# Patient Record
Sex: MALE | Race: ASIAN | NOT HISPANIC OR LATINO | URBAN - METROPOLITAN AREA
[De-identification: names, ages, dates, MRNs, and addresses within clinical notes are randomized per-mention and may not be internally consistent; named-entity substitution may affect disease eponyms.]

---

## 2022-09-22 ENCOUNTER — INPATIENT (INPATIENT)
Facility: HOSPITAL | Age: 57
LOS: 9 days | Discharge: HOME HEALTH | DRG: 236 | End: 2022-10-02
Attending: THORACIC SURGERY (CARDIOTHORACIC VASCULAR SURGERY) | Admitting: THORACIC SURGERY (CARDIOTHORACIC VASCULAR SURGERY)
Payer: COMMERCIAL

## 2022-09-22 VITALS
HEART RATE: 67 BPM | SYSTOLIC BLOOD PRESSURE: 190 MMHG | DIASTOLIC BLOOD PRESSURE: 66 MMHG | WEIGHT: 164.46 LBS | RESPIRATION RATE: 16 BRPM | TEMPERATURE: 97 F | OXYGEN SATURATION: 100 %

## 2022-09-22 LAB
A1C WITH ESTIMATED AVERAGE GLUCOSE RESULT: 7.6 % — HIGH (ref 4–5.6)
ALBUMIN SERPL ELPH-MCNC: 4.1 G/DL — SIGNIFICANT CHANGE UP (ref 3.3–5)
ALP SERPL-CCNC: 68 U/L — SIGNIFICANT CHANGE UP (ref 40–120)
ALT FLD-CCNC: 25 U/L — SIGNIFICANT CHANGE UP (ref 10–45)
ANION GAP SERPL CALC-SCNC: 15 MMOL/L — SIGNIFICANT CHANGE UP (ref 5–17)
APTT BLD: 31.8 SEC — SIGNIFICANT CHANGE UP (ref 27.5–35.5)
AST SERPL-CCNC: 25 U/L — SIGNIFICANT CHANGE UP (ref 10–40)
BASOPHILS # BLD AUTO: 0.1 K/UL — SIGNIFICANT CHANGE UP (ref 0–0.2)
BASOPHILS NFR BLD AUTO: 1.1 % — SIGNIFICANT CHANGE UP (ref 0–2)
BILIRUB SERPL-MCNC: 0.4 MG/DL — SIGNIFICANT CHANGE UP (ref 0.2–1.2)
BLD GP AB SCN SERPL QL: NEGATIVE — SIGNIFICANT CHANGE UP
BLD GP AB SCN SERPL QL: NEGATIVE — SIGNIFICANT CHANGE UP
BUN SERPL-MCNC: 24 MG/DL — HIGH (ref 7–23)
CALCIUM SERPL-MCNC: 9.5 MG/DL — SIGNIFICANT CHANGE UP (ref 8.4–10.5)
CHLORIDE SERPL-SCNC: 104 MMOL/L — SIGNIFICANT CHANGE UP (ref 96–108)
CHOLEST SERPL-MCNC: 146 MG/DL — SIGNIFICANT CHANGE UP
CK MB CFR SERPL CALC: 3.8 NG/ML — SIGNIFICANT CHANGE UP (ref 0–6.7)
CK SERPL-CCNC: 150 U/L — SIGNIFICANT CHANGE UP (ref 30–200)
CO2 SERPL-SCNC: 21 MMOL/L — LOW (ref 22–31)
CREAT SERPL-MCNC: 1.44 MG/DL — HIGH (ref 0.5–1.3)
EGFR: 57 ML/MIN/1.73M2 — LOW
EOSINOPHIL # BLD AUTO: 0.15 K/UL — SIGNIFICANT CHANGE UP (ref 0–0.5)
EOSINOPHIL NFR BLD AUTO: 1.7 % — SIGNIFICANT CHANGE UP (ref 0–6)
ESTIMATED AVERAGE GLUCOSE: 171 MG/DL — HIGH (ref 68–114)
GLUCOSE BLDC GLUCOMTR-MCNC: 125 MG/DL — HIGH (ref 70–99)
GLUCOSE SERPL-MCNC: 151 MG/DL — HIGH (ref 70–99)
HCT VFR BLD CALC: 41.1 % — SIGNIFICANT CHANGE UP (ref 39–50)
HDLC SERPL-MCNC: 43 MG/DL — SIGNIFICANT CHANGE UP
HGB BLD-MCNC: 13.5 G/DL — SIGNIFICANT CHANGE UP (ref 13–17)
IMM GRANULOCYTES NFR BLD AUTO: 0.3 % — SIGNIFICANT CHANGE UP (ref 0–0.9)
INR BLD: 1.03 — SIGNIFICANT CHANGE UP (ref 0.88–1.16)
LIPID PNL WITH DIRECT LDL SERPL: 59 MG/DL — SIGNIFICANT CHANGE UP
LYMPHOCYTES # BLD AUTO: 1.8 K/UL — SIGNIFICANT CHANGE UP (ref 1–3.3)
LYMPHOCYTES # BLD AUTO: 20.6 % — SIGNIFICANT CHANGE UP (ref 13–44)
MCHC RBC-ENTMCNC: 29 PG — SIGNIFICANT CHANGE UP (ref 27–34)
MCHC RBC-ENTMCNC: 32.8 GM/DL — SIGNIFICANT CHANGE UP (ref 32–36)
MCV RBC AUTO: 88.2 FL — SIGNIFICANT CHANGE UP (ref 80–100)
MONOCYTES # BLD AUTO: 1.06 K/UL — HIGH (ref 0–0.9)
MONOCYTES NFR BLD AUTO: 12.1 % — SIGNIFICANT CHANGE UP (ref 2–14)
NEUTROPHILS # BLD AUTO: 5.59 K/UL — SIGNIFICANT CHANGE UP (ref 1.8–7.4)
NEUTROPHILS NFR BLD AUTO: 64.2 % — SIGNIFICANT CHANGE UP (ref 43–77)
NON HDL CHOLESTEROL: 103 MG/DL — SIGNIFICANT CHANGE UP
NRBC # BLD: 0 /100 WBCS — SIGNIFICANT CHANGE UP (ref 0–0)
NT-PROBNP SERPL-SCNC: 127 PG/ML — SIGNIFICANT CHANGE UP (ref 0–300)
PLATELET # BLD AUTO: 239 K/UL — SIGNIFICANT CHANGE UP (ref 150–400)
POTASSIUM SERPL-MCNC: 4.2 MMOL/L — SIGNIFICANT CHANGE UP (ref 3.5–5.3)
POTASSIUM SERPL-SCNC: 4.2 MMOL/L — SIGNIFICANT CHANGE UP (ref 3.5–5.3)
PROT SERPL-MCNC: 7.7 G/DL — SIGNIFICANT CHANGE UP (ref 6–8.3)
PROTHROM AB SERPL-ACNC: 12.3 SEC — SIGNIFICANT CHANGE UP (ref 10.5–13.4)
RBC # BLD: 4.66 M/UL — SIGNIFICANT CHANGE UP (ref 4.2–5.8)
RBC # FLD: 16.1 % — HIGH (ref 10.3–14.5)
RH IG SCN BLD-IMP: POSITIVE — SIGNIFICANT CHANGE UP
RH IG SCN BLD-IMP: POSITIVE — SIGNIFICANT CHANGE UP
SARS-COV-2 RNA SPEC QL NAA+PROBE: NEGATIVE — SIGNIFICANT CHANGE UP
SODIUM SERPL-SCNC: 140 MMOL/L — SIGNIFICANT CHANGE UP (ref 135–145)
TRIGL SERPL-MCNC: 222 MG/DL — HIGH
TROPONIN T SERPL-MCNC: 0.01 NG/ML — SIGNIFICANT CHANGE UP (ref 0–0.01)
TSH SERPL-MCNC: 2.36 UIU/ML — SIGNIFICANT CHANGE UP (ref 0.27–4.2)
WBC # BLD: 8.73 K/UL — SIGNIFICANT CHANGE UP (ref 3.8–10.5)
WBC # FLD AUTO: 8.73 K/UL — SIGNIFICANT CHANGE UP (ref 3.8–10.5)

## 2022-09-22 PROCEDURE — 71045 X-RAY EXAM CHEST 1 VIEW: CPT | Mod: 26

## 2022-09-22 PROCEDURE — 93010 ELECTROCARDIOGRAM REPORT: CPT

## 2022-09-22 RX ORDER — DEXTROSE 50 % IN WATER 50 %
25 SYRINGE (ML) INTRAVENOUS ONCE
Refills: 0 | Status: DISCONTINUED | OUTPATIENT
Start: 2022-09-22 | End: 2022-09-28

## 2022-09-22 RX ORDER — SODIUM CHLORIDE 9 MG/ML
1000 INJECTION, SOLUTION INTRAVENOUS
Refills: 0 | Status: DISCONTINUED | OUTPATIENT
Start: 2022-09-22 | End: 2022-09-28

## 2022-09-22 RX ORDER — ALOGLIPTIN 12.5 MG/1
0 TABLET, FILM COATED ORAL
Qty: 0 | Refills: 0 | DISCHARGE

## 2022-09-22 RX ORDER — LOSARTAN POTASSIUM 100 MG/1
0 TABLET, FILM COATED ORAL
Qty: 0 | Refills: 0 | DISCHARGE

## 2022-09-22 RX ORDER — GLUCAGON INJECTION, SOLUTION 0.5 MG/.1ML
1 INJECTION, SOLUTION SUBCUTANEOUS ONCE
Refills: 0 | Status: DISCONTINUED | OUTPATIENT
Start: 2022-09-22 | End: 2022-09-28

## 2022-09-22 RX ORDER — LEVOTHYROXINE SODIUM 125 MCG
0 TABLET ORAL
Qty: 0 | Refills: 0 | DISCHARGE

## 2022-09-22 RX ORDER — GABAPENTIN 400 MG/1
0 CAPSULE ORAL
Qty: 0 | Refills: 0 | DISCHARGE

## 2022-09-22 RX ORDER — METFORMIN HYDROCHLORIDE 850 MG/1
0 TABLET ORAL
Qty: 0 | Refills: 0 | DISCHARGE

## 2022-09-22 RX ORDER — HEPARIN SODIUM 5000 [USP'U]/ML
5000 INJECTION INTRAVENOUS; SUBCUTANEOUS EVERY 8 HOURS
Refills: 0 | Status: DISCONTINUED | OUTPATIENT
Start: 2022-09-22 | End: 2022-09-23

## 2022-09-22 RX ORDER — METOPROLOL TARTRATE 50 MG
0 TABLET ORAL
Qty: 0 | Refills: 0 | DISCHARGE

## 2022-09-22 RX ORDER — ATORVASTATIN CALCIUM 80 MG/1
10 TABLET, FILM COATED ORAL AT BEDTIME
Refills: 0 | Status: DISCONTINUED | OUTPATIENT
Start: 2022-09-22 | End: 2022-09-28

## 2022-09-22 RX ORDER — SODIUM CHLORIDE 9 MG/ML
3 INJECTION INTRAMUSCULAR; INTRAVENOUS; SUBCUTANEOUS EVERY 8 HOURS
Refills: 0 | Status: DISCONTINUED | OUTPATIENT
Start: 2022-09-22 | End: 2022-09-28

## 2022-09-22 RX ORDER — ASPIRIN/CALCIUM CARB/MAGNESIUM 324 MG
0 TABLET ORAL
Qty: 0 | Refills: 0 | DISCHARGE

## 2022-09-22 RX ORDER — INSULIN LISPRO 100/ML
VIAL (ML) SUBCUTANEOUS
Refills: 0 | Status: DISCONTINUED | OUTPATIENT
Start: 2022-09-22 | End: 2022-09-28

## 2022-09-22 RX ORDER — ATORVASTATIN CALCIUM 80 MG/1
0 TABLET, FILM COATED ORAL
Qty: 0 | Refills: 0 | DISCHARGE

## 2022-09-22 RX ORDER — DEXTROSE 50 % IN WATER 50 %
12.5 SYRINGE (ML) INTRAVENOUS ONCE
Refills: 0 | Status: DISCONTINUED | OUTPATIENT
Start: 2022-09-22 | End: 2022-09-28

## 2022-09-22 RX ORDER — DEXTROSE 50 % IN WATER 50 %
15 SYRINGE (ML) INTRAVENOUS ONCE
Refills: 0 | Status: DISCONTINUED | OUTPATIENT
Start: 2022-09-22 | End: 2022-09-28

## 2022-09-22 RX ORDER — FUROSEMIDE 40 MG
0 TABLET ORAL
Qty: 0 | Refills: 0 | DISCHARGE

## 2022-09-22 RX ORDER — LEVOTHYROXINE SODIUM 125 MCG
50 TABLET ORAL DAILY
Refills: 0 | Status: DISCONTINUED | OUTPATIENT
Start: 2022-09-23 | End: 2022-09-28

## 2022-09-22 RX ORDER — METOPROLOL TARTRATE 50 MG
12.5 TABLET ORAL EVERY 12 HOURS
Refills: 0 | Status: DISCONTINUED | OUTPATIENT
Start: 2022-09-23 | End: 2022-09-23

## 2022-09-22 RX ORDER — PANTOPRAZOLE SODIUM 20 MG/1
40 TABLET, DELAYED RELEASE ORAL
Refills: 0 | Status: DISCONTINUED | OUTPATIENT
Start: 2022-09-23 | End: 2022-09-28

## 2022-09-22 RX ORDER — GABAPENTIN 400 MG/1
300 CAPSULE ORAL EVERY 12 HOURS
Refills: 0 | Status: DISCONTINUED | OUTPATIENT
Start: 2022-09-23 | End: 2022-09-28

## 2022-09-22 RX ORDER — ASPIRIN/CALCIUM CARB/MAGNESIUM 324 MG
81 TABLET ORAL DAILY
Refills: 0 | Status: DISCONTINUED | OUTPATIENT
Start: 2022-09-23 | End: 2022-09-28

## 2022-09-22 RX ORDER — INSULIN NPH HUM/REG INSULIN HM 70-30/ML
0 VIAL (ML) SUBCUTANEOUS
Qty: 0 | Refills: 0 | DISCHARGE

## 2022-09-22 RX ORDER — EMPAGLIFLOZIN 10 MG/1
0 TABLET, FILM COATED ORAL
Qty: 0 | Refills: 0 | DISCHARGE

## 2022-09-22 RX ADMIN — SODIUM CHLORIDE 3 MILLILITER(S): 9 INJECTION INTRAMUSCULAR; INTRAVENOUS; SUBCUTANEOUS at 21:15

## 2022-09-22 NOTE — H&P ADULT - ASSESSMENT
55 yo M with PMHx of HTN, HLD, DM, PAD, CKD, CVA x3 (2013, 2014, 2015) with residual right sided weakness, who originally present to [facility] following an abnormal stress test. During workup patient had Coronary Catheterization showing pLAD 80% stenosis, Diagonal 1 70% stenosis, Diagonal 2 80% stenosis, Ramus 70% stenosis, OM1 90% stenosis, mRCA 85% stenosis, EF 60%. Patient now transferred to [facility] under the care of Dr. Herrera, in setting of multi vessel CAD, for further evaluation and management.    Plan:    Neurovascular:   - Pain well controlled with current regimen.  - Diabetic Neuropathy: Cont. Gabapentin  - Hx of CVA x3 in the past, 2013, 2014, 2015 with residual right sided weakness    Cardiovascular:   - Multi-Vessel CAD       - Cardiac cath at [facility] showing multi-vessel CAD       - PST underway       - Cont. ASA, Metoprolol 12.5 q12h, Atorvastatin  - Hemodynamically stable.   - Monitor: BP, HR, tele    Respiratory:   - Oxygenating well on room air  - Encourage continued use of IS 10x/hr and frequent ambulation  - CXR: Pending    GI:  - GI PPX: protonix  - PO Diet    Renal / :  - Continue to monitor renal function: BUN/Cr Pending  - Monitor I/O's daily     Endocrine:    - Hx of DM       - A1c: Pending       - Takes Insulin, Metformin, Jardiance, and Alogliptin at home  - Hx of Hypothyroid       - TSH: Pending       - Cont Synthroid 50    Hematologic:  - CBC: H/H- Pending  - Coagulation Panel. Pending    ID:  - Temperature:  Afebrile  - CBC: WBC- Pending  - Continue to observe for SIRS/Sepsis Syndrome.    Prophylaxis:  - DVT prophylaxis with 5000 SubQ Heparin q8h.  - Continue with SCD's b/l while patient is at rest     Disposition:  - PST

## 2022-09-22 NOTE — H&P ADULT - NSICDXPASTMEDICALHX_GEN_ALL_CORE_FT
PAST MEDICAL HISTORY:  Cerebrovascular accident (CVA)     CKD (chronic kidney disease)     DM (diabetes mellitus)     HLD (hyperlipidemia)     HTN (hypertension)     PAD (peripheral artery disease)

## 2022-09-22 NOTE — H&P ADULT - NSHPSOCIALHISTORY_GEN_ALL_CORE
Smoking: Former Smoker, quit 9 years ago, 35 pack year history  Alcohol: Denies  Illicit Drug Use: Denies  Retired  Lives at home with Wife and Son

## 2022-09-22 NOTE — H&P ADULT - NSICDXFAMILYHX_GEN_ALL_CORE_FT
FAMILY HISTORY:  Father  Still living? No  FH: stroke, Age at diagnosis: Age Unknown    Mother  Still living? Unknown  Family history of diabetes mellitus (DM), Age at diagnosis: Age Unknown

## 2022-09-22 NOTE — H&P ADULT - NSHPPHYSICALEXAM_GEN_ALL_CORE
Appearance: Normal	  HEENT:   Normal oral mucosa, PERRL, EOMI	  Neck: Supple, - JVD; - Carotid Bruit   Cardiovascular: Normal S1 S2. No JVD. No murmurs.  Respiratory: Lungs clear to auscultation. No Rales, Rhonchi, Wheezing.	  Gastrointestinal:  Soft, non-tender, + BS.	  Skin: No rashes. No ecchymoses. No cyanosis.  Extremities: Normal range of motion, 5/5 strength left extremities, 4/5 strength right extremities, no clubbing, cyanosis or edema.  Vascular: Peripheral pulses palpable 2+ bilaterally.  Neurologic: Non-focal.  Psychiatry: A & O x 3, mood & affect appropriate.      T(C): 36.3 (22 Sep 2022 21:00), Max: 36.3 (22 Sep 2022 21:00)  T(F): 97.3 (22 Sep 2022 21:00), Max: 97.3 (22 Sep 2022 21:00)  HR: 67 (22 Sep 2022 21:00) (67 - 67)  BP: 190/66 (22 Sep 2022 21:00) (190/66 - 190/66)  BP(mean): 114 (22 Sep 2022 21:00) (114 - 114)  RR: 16 (22 Sep 2022 21:00) (16 - 16)  SpO2: 100% (22 Sep 2022 21:00) (100% - 100%)    O2 Parameters below as of 22 Sep 2022 21:00  Patient On (Oxygen Delivery Method): room air

## 2022-09-22 NOTE — H&P ADULT - HISTORY OF PRESENT ILLNESS
55 yo M with PMHx of HTN, HLD, DM, PAD, CKD, CVA x3 (2013, 2014, 2015) with residual right sided weakness, who originally present to [facility] following an abnormal stress test. During workup patient had Coronary Catheterization showing pLAD 80% stenosis, Diagonal 1 70% stenosis, Diagonal 2 80% stenosis, Ramus 70% stenosis, OM1 90% stenosis, mRCA 85% stenosis, EF 60%. Patient now transferred to [facility] under the care of Dr. Herrera, in setting of multi vessel CAD, for further evaluation and management.    Patient seen at bedside this evening. Endorses no acute complaints. Admits to MCDOWELL, Denies CP, SOB, Palpitations, PND, N/V, Fever, Chills.

## 2022-09-22 NOTE — H&P ADULT - NSHPREVIEWOFSYSTEMS_GEN_ALL_CORE
CONSTITUTIONAL: No fevers / chills, sweats, fatigue, weight loss, weight gain  NEUROLOGICAL: Admits to tingling sensation in hands and feet. Right sided weakness form prior stroke. Denies seizures, syncope, confusion  EYES: No blurry vision, discharge, pain, loss of vision  ENT: No difficulty hearing, vertigo, dysphagia, epistaxis, recent dental work  CV: Admits to MCDOWELL, No chest pain, palpitations, orthopnea  RESPIRATORY:  No wheezing, SOB, cough / sputum, hemoptysis  GI: No nausea, vomiting, diarrhea, constipation, melena  : No hematuria, dysuria, urgency, incontinence  MUSCULOSKELETAL: No arthritis, joint swelling, muscle weakness  SKIN/BREAST: No rash, itching, hair loss, masses  PSYCHIATRY: No depression, anxiety, suicidal ideation  HEMATOLOGY:  No bruising easily, enlarged lymph nodes, tender lymph nodes  ENDOCRINE: No cold intolerance, heat intolerance, polydipsia

## 2022-09-23 DIAGNOSIS — E11.9 TYPE 2 DIABETES MELLITUS WITHOUT COMPLICATIONS: ICD-10-CM

## 2022-09-23 PROBLEM — Z00.00 ENCOUNTER FOR PREVENTIVE HEALTH EXAMINATION: Status: ACTIVE | Noted: 2022-09-23

## 2022-09-23 LAB
ANION GAP SERPL CALC-SCNC: 15 MMOL/L — SIGNIFICANT CHANGE UP (ref 5–17)
BUN SERPL-MCNC: 21 MG/DL — SIGNIFICANT CHANGE UP (ref 7–23)
CALCIUM SERPL-MCNC: 9.1 MG/DL — SIGNIFICANT CHANGE UP (ref 8.4–10.5)
CHLORIDE SERPL-SCNC: 101 MMOL/L — SIGNIFICANT CHANGE UP (ref 96–108)
CO2 SERPL-SCNC: 22 MMOL/L — SIGNIFICANT CHANGE UP (ref 22–31)
CREAT SERPL-MCNC: 1.42 MG/DL — HIGH (ref 0.5–1.3)
EGFR: 58 ML/MIN/1.73M2 — LOW
GLUCOSE BLDC GLUCOMTR-MCNC: 158 MG/DL — HIGH (ref 70–99)
GLUCOSE BLDC GLUCOMTR-MCNC: 168 MG/DL — HIGH (ref 70–99)
GLUCOSE BLDC GLUCOMTR-MCNC: 183 MG/DL — HIGH (ref 70–99)
GLUCOSE BLDC GLUCOMTR-MCNC: 218 MG/DL — HIGH (ref 70–99)
GLUCOSE SERPL-MCNC: 233 MG/DL — HIGH (ref 70–99)
HCT VFR BLD CALC: 40.4 % — SIGNIFICANT CHANGE UP (ref 39–50)
HCV AB S/CO SERPL IA: 1.2 S/CO — HIGH
HCV AB SERPL-IMP: REACTIVE
HGB BLD-MCNC: 13.2 G/DL — SIGNIFICANT CHANGE UP (ref 13–17)
MCHC RBC-ENTMCNC: 28.4 PG — SIGNIFICANT CHANGE UP (ref 27–34)
MCHC RBC-ENTMCNC: 32.7 GM/DL — SIGNIFICANT CHANGE UP (ref 32–36)
MCV RBC AUTO: 87.1 FL — SIGNIFICANT CHANGE UP (ref 80–100)
NRBC # BLD: 0 /100 WBCS — SIGNIFICANT CHANGE UP (ref 0–0)
PA ADP PRP-ACNC: 210 PRU — SIGNIFICANT CHANGE UP (ref 194–418)
PA ADP PRP-ACNC: 94 PRU — LOW (ref 194–418)
PLATELET # BLD AUTO: 236 K/UL — SIGNIFICANT CHANGE UP (ref 150–400)
POTASSIUM SERPL-MCNC: 4.2 MMOL/L — SIGNIFICANT CHANGE UP (ref 3.5–5.3)
POTASSIUM SERPL-SCNC: 4.2 MMOL/L — SIGNIFICANT CHANGE UP (ref 3.5–5.3)
RBC # BLD: 4.64 M/UL — SIGNIFICANT CHANGE UP (ref 4.2–5.8)
RBC # FLD: 16 % — HIGH (ref 10.3–14.5)
SODIUM SERPL-SCNC: 138 MMOL/L — SIGNIFICANT CHANGE UP (ref 135–145)
WBC # BLD: 7.58 K/UL — SIGNIFICANT CHANGE UP (ref 3.8–10.5)
WBC # FLD AUTO: 7.58 K/UL — SIGNIFICANT CHANGE UP (ref 3.8–10.5)

## 2022-09-23 PROCEDURE — 99232 SBSQ HOSP IP/OBS MODERATE 35: CPT

## 2022-09-23 PROCEDURE — 93306 TTE W/DOPPLER COMPLETE: CPT | Mod: 26

## 2022-09-23 PROCEDURE — 93880 EXTRACRANIAL BILAT STUDY: CPT | Mod: 26

## 2022-09-23 PROCEDURE — 94010 BREATHING CAPACITY TEST: CPT | Mod: 26

## 2022-09-23 PROCEDURE — 71250 CT THORAX DX C-: CPT | Mod: 26

## 2022-09-23 PROCEDURE — 70450 CT HEAD/BRAIN W/O DYE: CPT | Mod: 26

## 2022-09-23 RX ORDER — MAGNESIUM OXIDE 400 MG ORAL TABLET 241.3 MG
400 TABLET ORAL DAILY
Refills: 0 | Status: DISCONTINUED | OUTPATIENT
Start: 2022-09-23 | End: 2022-09-23

## 2022-09-23 RX ORDER — FUROSEMIDE 40 MG
40 TABLET ORAL DAILY
Refills: 0 | Status: DISCONTINUED | OUTPATIENT
Start: 2022-09-23 | End: 2022-09-24

## 2022-09-23 RX ORDER — INSULIN GLARGINE 100 [IU]/ML
12 INJECTION, SOLUTION SUBCUTANEOUS AT BEDTIME
Refills: 0 | Status: DISCONTINUED | OUTPATIENT
Start: 2022-09-23 | End: 2022-09-26

## 2022-09-23 RX ORDER — SODIUM CHLORIDE 9 MG/ML
500 INJECTION, SOLUTION INTRAVENOUS
Refills: 0 | Status: DISCONTINUED | OUTPATIENT
Start: 2022-09-23 | End: 2022-09-25

## 2022-09-23 RX ORDER — INSULIN LISPRO 100/ML
4 VIAL (ML) SUBCUTANEOUS
Refills: 0 | Status: DISCONTINUED | OUTPATIENT
Start: 2022-09-23 | End: 2022-09-26

## 2022-09-23 RX ORDER — ENOXAPARIN SODIUM 100 MG/ML
75 INJECTION SUBCUTANEOUS EVERY 12 HOURS
Refills: 0 | Status: DISCONTINUED | OUTPATIENT
Start: 2022-09-23 | End: 2022-09-24

## 2022-09-23 RX ORDER — METOPROLOL TARTRATE 50 MG
25 TABLET ORAL EVERY 12 HOURS
Refills: 0 | Status: DISCONTINUED | OUTPATIENT
Start: 2022-09-23 | End: 2022-09-28

## 2022-09-23 RX ORDER — ISOSORBIDE MONONITRATE 60 MG/1
30 TABLET, EXTENDED RELEASE ORAL DAILY
Refills: 0 | Status: DISCONTINUED | OUTPATIENT
Start: 2022-09-23 | End: 2022-09-23

## 2022-09-23 RX ORDER — POTASSIUM CHLORIDE 20 MEQ
20 PACKET (EA) ORAL DAILY
Refills: 0 | Status: DISCONTINUED | OUTPATIENT
Start: 2022-09-23 | End: 2022-09-23

## 2022-09-23 RX ORDER — FUROSEMIDE 40 MG
20 TABLET ORAL DAILY
Refills: 0 | Status: DISCONTINUED | OUTPATIENT
Start: 2022-09-23 | End: 2022-09-23

## 2022-09-23 RX ORDER — ISOSORBIDE MONONITRATE 60 MG/1
60 TABLET, EXTENDED RELEASE ORAL DAILY
Refills: 0 | Status: DISCONTINUED | OUTPATIENT
Start: 2022-09-23 | End: 2022-09-28

## 2022-09-23 RX ORDER — POTASSIUM CHLORIDE 20 MEQ
20 PACKET (EA) ORAL DAILY
Refills: 0 | Status: DISCONTINUED | OUTPATIENT
Start: 2022-09-23 | End: 2022-09-24

## 2022-09-23 RX ORDER — POTASSIUM CHLORIDE 20 MEQ
40 PACKET (EA) ORAL DAILY
Refills: 0 | Status: DISCONTINUED | OUTPATIENT
Start: 2022-09-23 | End: 2022-09-23

## 2022-09-23 RX ADMIN — INSULIN GLARGINE 12 UNIT(S): 100 INJECTION, SOLUTION SUBCUTANEOUS at 22:32

## 2022-09-23 RX ADMIN — Medication 25 MILLIGRAM(S): at 00:40

## 2022-09-23 RX ADMIN — Medication 25 MILLIGRAM(S): at 17:45

## 2022-09-23 RX ADMIN — GABAPENTIN 300 MILLIGRAM(S): 400 CAPSULE ORAL at 05:41

## 2022-09-23 RX ADMIN — GABAPENTIN 300 MILLIGRAM(S): 400 CAPSULE ORAL at 17:45

## 2022-09-23 RX ADMIN — PANTOPRAZOLE SODIUM 40 MILLIGRAM(S): 20 TABLET, DELAYED RELEASE ORAL at 06:49

## 2022-09-23 RX ADMIN — Medication 25 MILLIGRAM(S): at 05:42

## 2022-09-23 RX ADMIN — Medication 2: at 07:24

## 2022-09-23 RX ADMIN — Medication 50 MICROGRAM(S): at 05:42

## 2022-09-23 RX ADMIN — Medication 2: at 12:10

## 2022-09-23 RX ADMIN — Medication 4: at 17:20

## 2022-09-23 RX ADMIN — SODIUM CHLORIDE 3 MILLILITER(S): 9 INJECTION INTRAMUSCULAR; INTRAVENOUS; SUBCUTANEOUS at 06:49

## 2022-09-23 RX ADMIN — Medication 20 MILLIEQUIVALENT(S): at 12:47

## 2022-09-23 RX ADMIN — Medication 40 MILLIGRAM(S): at 05:42

## 2022-09-23 RX ADMIN — SODIUM CHLORIDE 3 MILLILITER(S): 9 INJECTION INTRAMUSCULAR; INTRAVENOUS; SUBCUTANEOUS at 22:09

## 2022-09-23 RX ADMIN — ATORVASTATIN CALCIUM 10 MILLIGRAM(S): 80 TABLET, FILM COATED ORAL at 22:05

## 2022-09-23 RX ADMIN — ENOXAPARIN SODIUM 75 MILLIGRAM(S): 100 INJECTION SUBCUTANEOUS at 07:24

## 2022-09-23 RX ADMIN — ISOSORBIDE MONONITRATE 60 MILLIGRAM(S): 60 TABLET, EXTENDED RELEASE ORAL at 12:47

## 2022-09-23 RX ADMIN — Medication 81 MILLIGRAM(S): at 12:47

## 2022-09-23 RX ADMIN — ENOXAPARIN SODIUM 75 MILLIGRAM(S): 100 INJECTION SUBCUTANEOUS at 19:24

## 2022-09-23 RX ADMIN — SODIUM CHLORIDE 3 MILLILITER(S): 9 INJECTION INTRAMUSCULAR; INTRAVENOUS; SUBCUTANEOUS at 13:07

## 2022-09-23 RX ADMIN — Medication 2: at 22:08

## 2022-09-23 NOTE — PROGRESS NOTE ADULT - ASSESSMENT
57 y/o male with PMHx of HTN, HLD, DM, PAD, CKD, CVA x3 (2013, 2014, 2015) with residual right sided weakness, who originally present to [facility] following an abnormal stress test. During workup patient had Coronary Catheterization showing pLAD 80% stenosis, Diagonal 1 70% stenosis, Diagonal 2 80% stenosis, Ramus 70% stenosis, OM1 90% stenosis, mRCA 85% stenosis, EF 60%. On 9/22 he was transferred to [facility] under the care of Dr. Herrera, in setting of multi vessel CAD, for further evaluation and management. On 9/23 he was consented for a Cabg with a plan for OR either Monday or Wednesday. Neurology was consulted secondary to CVA history and obtained a baseline neuro exam. Endocrinology consulted with recommendations appreciated. Continue pre op planning.     Plan:    Neurovascular:   - Pain well controlled with current regimen.  - Diabetic Neuropathy: Cont. Gabapentin  #Hx of CVA x3 in the past, 2013, 2014, 2015 with residual right sided weakness  -Neuro consulted for baseline neuro exam  -CTH preformed with no acute abnormalities    Cardiovascular:   #Multi-Vessel CAD  - continue pre op planning, consent is signed and in chart  - CT chest completed, no acute abnormalities   - US of carotids completed with  no stenosis or flow abnormalities  - Cont. ASA, Metoprolol 12.5 q12h, Atorvastatin, and Imdur  - Hemodynamically stable.   - Monitor: BP, HR, tele    Respiratory:   - Oxygenating well on room air  - Encourage continued use of IS 10x/hr and frequent ambulation  - CXR: Pending    GI:  - GI PPX: protonix  - PO Diet  - Hep C reactive, follow up viral load     Renal / :  - Continue to monitor renal function: BUN/Cr 24/1.44  - Monitor I/O's daily     Endocrine:    #Hx of DM  - A1c: 7.6  - endo consulted, recommended Lispro 4 and lantus 12   - ISS   #Hx of Hypothyroid  - TSH: 2.36  - Cont Synthroid 50    Hematologic:  - CBC: H/H- 13.5/41.1  - Coagulation Panel. - WNL     ID:  - Temperature:  Afebrile  - CBC: WBC- 8.73  - Continue to observe for SIRS/Sepsis Syndrome.    Prophylaxis:  - DVT prophylaxis with 5000 SubQ Heparin q8h.  - Continue with SCD's b/l while patient is at rest     Disposition:  - continue pre op planning for OR next week

## 2022-09-23 NOTE — CONSULT NOTE ADULT - PROBLEM SELECTOR RECOMMENDATION 9
Please continue lantus       units at night / morning.  Please continue lispro      units before each meal.  Please continue lispro moderate / low dose sliding scale four times daily with meals and at bedtime    Pt's fingerstick glucose goal is     Will continue to monitor     For discharge, pt can continue    Pt can follow up at discharge with [facility] by calling  to make an appointment.   Will discuss case with     and update primary team Please continue lantus       units at night / morning.  Please continue lispro      units before each meal.  Please continue lispro moderate / low dose sliding scale four times daily with meals and at bedtime    Pt's fingerstick glucose goal is 100-180.    Will continue to monitor     For discharge, pt can continue    Pt can follow up at discharge with [facility] by calling  to make an appointment.   Will discuss case with Dr. Chamorro   and update primary team Please start lantus 12      units at night.  Please start lispro  4   units before each meal.  Please continue lispro moderate  dose sliding scale four times daily with meals and at bedtime    Pt's fingerstick glucose goal is 100-180.    Will continue to monitor     For discharge, pt can continue    Pt can follow up at discharge with [facility] by calling  to make an appointment.   Will discuss case with Dr. Chamorro   and update primary team

## 2022-09-23 NOTE — CONSULT NOTE ADULT - ASSESSMENT
57 yo M with PMHx of HTN, HLD, DM, PAD, CKD, CVA x3 (2013, 2014, 2015) with residual right sided weakness, who originally present to [facility] following an abnormal stress test. During workup patient had Coronary Catheterization showing pLAD 80% stenosis, Diagonal 1 70% stenosis, Diagonal 2 80% stenosis, Ramus 70% stenosis, OM1 90% stenosis, mRCA 85% stenosis, EF 60%. Patient now transferred to [facility] under the care of Dr. Herrera, in setting of multi vessel CAD, for further evaluation and management.      A1C: 7.6%  Weight: 75kg  Cr/GRF: 1.4/57  EF 55-60%

## 2022-09-23 NOTE — PROGRESS NOTE ADULT - SUBJECTIVE AND OBJECTIVE BOX
Patient discussed on morning rounds with Dr. Herrera       Operation / Date: Pre op planning for CABG     SUBJECTIVE ASSESSMENT:  56y Male seen and examined at bedside with no complaints. He tolerated a PO diet and has been ambulating with no issues. He denies nausea, vomiting, chest pain or pressure, fevers, chills, sweats, or any further associated symptoms.     Vital Signs Last 24 Hrs  T(C): 36.6 (23 Sep 2022 13:34), Max: 36.6 (23 Sep 2022 13:34)  T(F): 97.9 (23 Sep 2022 13:34), Max: 97.9 (23 Sep 2022 13:34)  HR: 62 (23 Sep 2022 11:45) (58 - 71)  BP: 135/62 (23 Sep 2022 11:45) (135/62 - 190/66)  BP(mean): 89 (23 Sep 2022 11:45) (89 - 119)  RR: 18 (23 Sep 2022 11:45) (16 - 18)  SpO2: 96% (23 Sep 2022 11:45) (96% - 100%)    Parameters below as of 23 Sep 2022 11:45  Patient On (Oxygen Delivery Method): room air      I&O's Detail    22 Sep 2022 07:01  -  23 Sep 2022 07:00  --------------------------------------------------------  IN:    Lactated Ringers: 200 mL  Total IN: 200 mL    OUT:  Total OUT: 0 mL    Total NET: 200 mL    CHEST TUBE:  none  SILVIA DRAIN:  none  EPICARDIAL WIRES: none   TIE DOWNS: none  BERGMAN: none    PHYSICAL EXAM:  GEN: NAD, looks comfortable  Psych: Mood appropriate  Neuro: A&Ox3.  No focal deficits.  Moving all extremities.   HEENT: No obvious abnormalities  CV: S1S2, regular, no murmurs appreciated.  No carotid bruits.  No JVD  Lungs: Clear B/L.  No wheezing, rales or rhonchi  ABD: Soft, non-tender, non-distended.  +Bowel sounds  EXT: Warm and well perfused.  No peripheral edema noted  Musculoskeletal: Moving all extremities with normal ROM, no joint swelling  PV: Pedal pulses are present via doppler, but weak bilaterally on the lower extremities, upper bilateral pulses are strong and palpable.   incisions: none at this time     LABS:                        13.5   8.73  )-----------( 239      ( 22 Sep 2022 21:26 )             41.1       PT/INR - ( 22 Sep 2022 21:26 )   PT: 12.3 sec;   INR: 1.03          PTT - ( 22 Sep 2022 21:26 )  PTT:31.8 sec    09-22    140  |  104  |  24<H>  ----------------------------<  151<H>  4.2   |  21<L>  |  1.44<H>    Ca    9.5      22 Sep 2022 21:26    TPro  7.7  /  Alb  4.1  /  TBili  0.4  /  DBili  x   /  AST  25  /  ALT  25  /  AlkPhos  68  09-22      MEDICATIONS  (STANDING):  aspirin enteric coated 81 milliGRAM(s) Oral daily  atorvastatin 10 milliGRAM(s) Oral at bedtime  dextrose 5%. 1000 milliLiter(s) (50 mL/Hr) IV Continuous <Continuous>  dextrose 5%. 1000 milliLiter(s) (100 mL/Hr) IV Continuous <Continuous>  dextrose 50% Injectable 25 Gram(s) IV Push once  dextrose 50% Injectable 12.5 Gram(s) IV Push once  dextrose 50% Injectable 25 Gram(s) IV Push once  enoxaparin Injectable 75 milliGRAM(s) SubCutaneous every 12 hours  furosemide    Tablet 40 milliGRAM(s) Oral daily  gabapentin 300 milliGRAM(s) Oral every 12 hours  glucagon  Injectable 1 milliGRAM(s) IntraMuscular once  insulin lispro (ADMELOG) corrective regimen sliding scale   SubCutaneous Before meals and at bedtime  isosorbide   mononitrate ER Tablet (IMDUR) 60 milliGRAM(s) Oral daily  lactated ringers. 500 milliLiter(s) (50 mL/Hr) IV Continuous <Continuous>  levothyroxine 50 MICROGram(s) Oral daily  metoprolol tartrate 25 milliGRAM(s) Oral every 12 hours  pantoprazole    Tablet 40 milliGRAM(s) Oral before breakfast  potassium chloride    Tablet ER 20 milliEquivalent(s) Oral daily  sodium chloride 0.9% lock flush 3 milliLiter(s) IV Push every 8 hours    MEDICATIONS  (PRN):  dextrose Oral Gel 15 Gram(s) Oral once PRN Blood Glucose LESS THAN 70 milliGRAM(s)/deciliter      RADIOLOGY & ADDITIONAL TESTS:  < from: US Duplex Carotid Arteries Complete, Bilateral (09.23.22 @ 11:32) >  Antegrade flow is noted within both vertebral arteries.    IMPRESSION:  1. No significant hemodynamic stenosis of either carotid artery.    < end of copied text >    < from: CT Chest No Cont (09.23.22 @ 10:56) >  Impression: 1. Mild amount of calcified plaque thoracic aorta.    2. Mildly calcified aortic valve.    3. Mild emphysema.    --- End of Report ---    < end of copied text >  < from: CT Head No Cont (09.23.22 @ 10:53) >  IMPRESSION:  No acute intracranial pathology.    < end of copied text >  < from: TTE Echo Complete w/o Contrast w/ Doppler (09.23.22 @ 09:29) >  CONCLUSIONS:     1. Normal left ventricular size and systolic function.   2. Normal right ventricular size and systolic function.   3. Normal atria.   4. Aortic sclerosis without significant stenosis.   5. Pulmonary artery systolic pressure is 23 mmHg.   6. Trivial pericardial effusion.   7. No prior echo is available for comparison.    < end of copied text >

## 2022-09-23 NOTE — CONSULT NOTE ADULT - SUBJECTIVE AND OBJECTIVE BOX
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 During workup patient had coronary catheterization showing pLAD 80% stenosis, Diagonal 1 70% stenosis, Diagonal 2 80% stenosis, Ramus 70% stenosis, OM1 90% stenosis, mRCA 85% stenosis, EF 60%. Patient now transferred to [facility] under the care of Dr. Herrera, in setting of multi vessel CAD, for further evaluation and management. He is planned to undergo CABG on Monday or Wednesday of next week. Stroke team consulted due to patient's prior history of CVA and for preop neurological exam.    T(C): 36.6 (09-23-22 @ 13:34), Max: 36.6 (09-23-22 @ 13:34)  HR: 62 (09-23-22 @ 11:45) (58 - 71)  BP: 135/62 (09-23-22 @ 11:45) (135/62 - 190/66)  RR: 18 (09-23-22 @ 11:45) (16 - 18)  SpO2: 96% (09-23-22 @ 11:45) (96% - 100%)    PAST MEDICAL & SURGICAL HISTORY:  HTN (hypertension)      HLD (hyperlipidemia)      DM (diabetes mellitus)      Cerebrovascular accident (CVA)      PAD (peripheral artery disease)      CKD (chronic kidney disease)      No significant past surgical history      FAMILY HISTORY:  FH: stroke (Father)    Family history of diabetes mellitus (DM) (Mother)    ROS:   Constitutional: No fever, weight loss or fatigue  Eyes: No eye pain, visual disturbances, or discharge  ENMT:  No difficulty hearing, tinnitus; No sinus or throat pain  Neck: No pain or stiffness  Respiratory: No cough, wheezing, chills or hemoptysis  Cardiovascular: No chest pain, palpitations, shortness of breath, or leg swelling  Gastrointestinal: No abdominal pain. No nausea, vomiting or hematemesis; No diarrhea or constipation. Nohematochezia.  Genitourinary: No dysuria, frequency, hematuria or incontinence  Neurological: As per HPI  Skin: No itching, burning, rashes or lesions   Endocrine: No heat or cold intolerance; No hair loss  Musculoskeletal: No joint pain or swelling; No muscle, back or extremity pain  Heme/Lymph: No easy bruising or bleeding gums    MEDICATIONS  (STANDING):  aspirin enteric coated 81 milliGRAM(s) Oral daily  atorvastatin 10 milliGRAM(s) Oral at bedtime  dextrose 5%. 1000 milliLiter(s) (50 mL/Hr) IV Continuous <Continuous>  dextrose 5%. 1000 milliLiter(s) (100 mL/Hr) IV Continuous <Continuous>  dextrose 50% Injectable 25 Gram(s) IV Push once  dextrose 50% Injectable 12.5 Gram(s) IV Push once  dextrose 50% Injectable 25 Gram(s) IV Push once  enoxaparin Injectable 75 milliGRAM(s) SubCutaneous every 12 hours  furosemide    Tablet 40 milliGRAM(s) Oral daily  gabapentin 300 milliGRAM(s) Oral every 12 hours  glucagon  Injectable 1 milliGRAM(s) IntraMuscular once  insulin lispro (ADMELOG) corrective regimen sliding scale   SubCutaneous Before meals and at bedtime  isosorbide   mononitrate ER Tablet (IMDUR) 60 milliGRAM(s) Oral daily  lactated ringers. 500 milliLiter(s) (50 mL/Hr) IV Continuous <Continuous>  levothyroxine 50 MICROGram(s) Oral daily  metoprolol tartrate 25 milliGRAM(s) Oral every 12 hours  pantoprazole    Tablet 40 milliGRAM(s) Oral before breakfast  potassium chloride    Tablet ER 20 milliEquivalent(s) Oral daily  sodium chloride 0.9% lock flush 3 milliLiter(s) IV Push every 8 hours    MEDICATIONS  (PRN):  dextrose Oral Gel 15 Gram(s) Oral once PRN Blood Glucose LESS THAN 70 milliGRAM(s)/deciliter    Allergies    No Known Drug Allergies  shellfish (Rash; Urticaria)    Intolerances      Vital Signs Last 24 Hrs  T(C): 36.6 (23 Sep 2022 13:34), Max: 36.6 (23 Sep 2022 13:34)  T(F): 97.9 (23 Sep 2022 13:34), Max: 97.9 (23 Sep 2022 13:34)  HR: 62 (23 Sep 2022 11:45) (58 - 71)  BP: 135/62 (23 Sep 2022 11:45) (135/62 - 190/66)  BP(mean): 89 (23 Sep 2022 11:45) (89 - 119)  RR: 18 (23 Sep 2022 11:45) (16 - 18)  SpO2: 96% (23 Sep 2022 11:45) (96% - 100%)    Parameters below as of 23 Sep 2022 11:45  Patient On (Oxygen Delivery Method): room air        Physical exam:  Constitutional: No acute distress, conversant  Eyes: Anicteric sclerae, moist conjunctivae, see below for CNs  Neck: trachea midline  Cardiovascular: Regular rate and rhythm on monitor  Pulmonary: No use of accessory muscles  GI: Abdomen soft, non-distended, non-tender  Extremities: no edema    Neurologic:  -Mental status: Awake, alert, oriented to person, place, and time. Speech is fluent with intact naming, repetition, and comprehension, no dysarthria. Recent and remote memory intact. Follows commands.   -Cranial nerves:   II: Visual fields are full to confrontation.  III, IV, VI: Extraocular movements are intact without nystagmus. Pupils equally round and reactive to light  VII: R NLFF  Motor: Normal bulk and tone. Right upper extremity pronator drift present. Right upper extremity 5-/5, left upper extremity 5/5. Right lower extremity 5-/5, left lower extremity 5/5.   Sensation: Intact to light touch bilaterally. No neglect or extinction on double simultaneous testing.  Coordination: No dysmetria on finger-to-nose and heel-to-shin bilaterally    NIHSS: 2    Fingerstick Blood Glucose: CAPILLARY BLOOD GLUCOSE      POCT Blood Glucose.: 158 mg/dL (23 Sep 2022 11:56)    LABS:                        13.5   8.73  )-----------( 239      ( 22 Sep 2022 21:26 )             41.1     09-22    140  |  104  |  24<H>  ----------------------------<  151<H>  4.2   |  21<L>  |  1.44<H>    Ca    9.5      22 Sep 2022 21:26    TPro  7.7  /  Alb  4.1  /  TBili  0.4  /  DBili  x   /  AST  25  /  ALT  25  /  AlkPhos  68  09-22    PT/INR - ( 22 Sep 2022 21:26 )   PT: 12.3 sec;   INR: 1.03          PTT - ( 22 Sep 2022 21:26 )  PTT:31.8 sec  CARDIAC MARKERS ( 22 Sep 2022 21:26 )  x     / 0.01 ng/mL / 150 U/L / x     / 3.8 ng/mL          RADIOLOGY & ADDITIONAL STUDIES:  < from: CT Head No Cont (09.23.22 @ 10:53) >    IMPRESSION:  No acute intracranial pathology.    Small left cerebellar and bilateral internal capsule lacunar infarcts,   likely chronic given history.    < end of copied text >    < from: US Duplex Carotid Arteries Complete, Bilateral (09.23.22 @ 11:32) >  IMPRESSION:  1. No significant hemodynamic stenosis of either carotid artery.      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 following an abnormal stress test. During workup patient had coronary catheterization showing pLAD 80% stenosis, Diagonal 1 70% stenosis, Diagonal 2 80% stenosis, Ramus 70% stenosis, OM1 90% stenosis, mRCA 85% stenosis, EF 60%. Patient now transferred to [facility] under the care of Dr. Herrera, in setting of multi vessel CAD, for further evaluation and management. He is planned to undergo CABG on Monday or Wednesday of next week. Stroke team consulted due to patient's prior history of CVA and for preop neurological exam.    T(C): 36.6 (09-23-22 @ 13:34), Max: 36.6 (09-23-22 @ 13:34)  HR: 62 (09-23-22 @ 11:45) (58 - 71)  BP: 135/62 (09-23-22 @ 11:45) (135/62 - 190/66)  RR: 18 (09-23-22 @ 11:45) (16 - 18)  SpO2: 96% (09-23-22 @ 11:45) (96% - 100%)    PAST MEDICAL & SURGICAL HISTORY:  HTN (hypertension)      HLD (hyperlipidemia)      DM (diabetes mellitus)      Cerebrovascular accident (CVA)      PAD (peripheral artery disease)      CKD (chronic kidney disease)      No significant past surgical history      FAMILY HISTORY:  FH: stroke (Father)    Family history of diabetes mellitus (DM) (Mother)    ROS:   Constitutional: No fever, weight loss or fatigue  Eyes: No eye pain, visual disturbances, or discharge  ENMT:  No difficulty hearing, tinnitus; No sinus or throat pain  Neck: No pain or stiffness  Respiratory: No cough, wheezing, chills or hemoptysis  Cardiovascular: No chest pain, palpitations, shortness of breath, or leg swelling  Gastrointestinal: No abdominal pain. No nausea, vomiting or hematemesis; No diarrhea or constipation. Nohematochezia.  Genitourinary: No dysuria, frequency, hematuria or incontinence  Neurological: As per HPI  Skin: No itching, burning, rashes or lesions   Endocrine: No heat or cold intolerance; No hair loss  Musculoskeletal: No joint pain or swelling; No muscle, back or extremity pain  Heme/Lymph: No easy bruising or bleeding gums    MEDICATIONS  (STANDING):  aspirin enteric coated 81 milliGRAM(s) Oral daily  atorvastatin 10 milliGRAM(s) Oral at bedtime  dextrose 5%. 1000 milliLiter(s) (50 mL/Hr) IV Continuous <Continuous>  dextrose 5%. 1000 milliLiter(s) (100 mL/Hr) IV Continuous <Continuous>  dextrose 50% Injectable 25 Gram(s) IV Push once  dextrose 50% Injectable 12.5 Gram(s) IV Push once  dextrose 50% Injectable 25 Gram(s) IV Push once  enoxaparin Injectable 75 milliGRAM(s) SubCutaneous every 12 hours  furosemide    Tablet 40 milliGRAM(s) Oral daily  gabapentin 300 milliGRAM(s) Oral every 12 hours  glucagon  Injectable 1 milliGRAM(s) IntraMuscular once  insulin lispro (ADMELOG) corrective regimen sliding scale   SubCutaneous Before meals and at bedtime  isosorbide   mononitrate ER Tablet (IMDUR) 60 milliGRAM(s) Oral daily  lactated ringers. 500 milliLiter(s) (50 mL/Hr) IV Continuous <Continuous>  levothyroxine 50 MICROGram(s) Oral daily  metoprolol tartrate 25 milliGRAM(s) Oral every 12 hours  pantoprazole    Tablet 40 milliGRAM(s) Oral before breakfast  potassium chloride    Tablet ER 20 milliEquivalent(s) Oral daily  sodium chloride 0.9% lock flush 3 milliLiter(s) IV Push every 8 hours    MEDICATIONS  (PRN):  dextrose Oral Gel 15 Gram(s) Oral once PRN Blood Glucose LESS THAN 70 milliGRAM(s)/deciliter    Allergies    No Known Drug Allergies  shellfish (Rash; Urticaria)    Intolerances      Vital Signs Last 24 Hrs  T(C): 36.6 (23 Sep 2022 13:34), Max: 36.6 (23 Sep 2022 13:34)  T(F): 97.9 (23 Sep 2022 13:34), Max: 97.9 (23 Sep 2022 13:34)  HR: 62 (23 Sep 2022 11:45) (58 - 71)  BP: 135/62 (23 Sep 2022 11:45) (135/62 - 190/66)  BP(mean): 89 (23 Sep 2022 11:45) (89 - 119)  RR: 18 (23 Sep 2022 11:45) (16 - 18)  SpO2: 96% (23 Sep 2022 11:45) (96% - 100%)    Parameters below as of 23 Sep 2022 11:45  Patient On (Oxygen Delivery Method): room air        Physical exam:  Constitutional: No acute distress, conversant  Eyes: Anicteric sclerae, moist conjunctivae, see below for CNs  Neck: trachea midline  Cardiovascular: Regular rate and rhythm on monitor  Pulmonary: No use of accessory muscles  GI: Abdomen soft, non-distended, non-tender  Extremities: no edema    Neurologic:  -Mental status: Awake, alert, oriented to person, place, and time. Speech is fluent with intact naming, repetition, and comprehension, no dysarthria. Recent and remote memory intact. Follows commands.   -Cranial nerves:   II: Visual fields are full to confrontation.  III, IV, VI: Extraocular movements are intact without nystagmus. Pupils equally round and reactive to light  VII: R NLFF  Motor: Normal bulk and tone. Right upper extremity pronator drift present. Right upper extremity 5-/5, left upper extremity 5/5. Right lower extremity 5-/5, left lower extremity 5/5.   Sensation: Intact to light touch bilaterally. No neglect or extinction on double simultaneous testing.  Coordination: No dysmetria on finger-to-nose and heel-to-shin bilaterally    NIHSS: 2    Fingerstick Blood Glucose: CAPILLARY BLOOD GLUCOSE      POCT Blood Glucose.: 158 mg/dL (23 Sep 2022 11:56)    LABS:                        13.5   8.73  )-----------( 239      ( 22 Sep 2022 21:26 )             41.1     09-22    140  |  104  |  24<H>  ----------------------------<  151<H>  4.2   |  21<L>  |  1.44<H>    Ca    9.5      22 Sep 2022 21:26    TPro  7.7  /  Alb  4.1  /  TBili  0.4  /  DBili  x   /  AST  25  /  ALT  25  /  AlkPhos  68  09-22    PT/INR - ( 22 Sep 2022 21:26 )   PT: 12.3 sec;   INR: 1.03          PTT - ( 22 Sep 2022 21:26 )  PTT:31.8 sec  CARDIAC MARKERS ( 22 Sep 2022 21:26 )  x     / 0.01 ng/mL / 150 U/L / x     / 3.8 ng/mL          RADIOLOGY & ADDITIONAL STUDIES:  < from: CT Head No Cont (09.23.22 @ 10:53) >    IMPRESSION:  No acute intracranial pathology.    Small left cerebellar and bilateral internal capsule lacunar infarcts,   likely chronic given history.    < end of copied text >    < from: US Duplex Carotid Arteries Complete, Bilateral (09.23.22 @ 11:32) >  IMPRESSION:  1. No significant hemodynamic stenosis of either carotid artery.      < end of copied text >      -----------------------------------------------------------------------------------------------------------------

## 2022-09-23 NOTE — CONSULT NOTE ADULT - NSCONSULTADDITIONALINFOA_GEN_ALL_CORE
Vascular Neurology Fellow Attestation:  Patient seen and examined with stroke team and agree with above. 56yo man with prior stroke with residual mld right hemiparesis presenting for planned CABG. No recent new or worsening neurologic deficits. No specific contraindication to procedure from neurology standpoint. Avoid intraoperative hypotension. Continue aspirin and statin. Recommend outpatient stroke neurology followup given unclear etiology of prior strokes.       Discussed with attending, Dr. Carlson

## 2022-09-23 NOTE — CONSULT NOTE ADULT - NS ATTEND AMEND GEN_ALL_CORE FT
Pt seen on rounds this afternoon.  56-yo man with a history of HBP, HTN, previous CVA X 3 (?? without residua, though he was noted to be mildly dysarthric), hypothyroidism (dx'd one year ago) and type 2 DM.    Pt speaks mostly Spanish but declined  and does speak some English  At home is on a combination of 70/30 insulin (30 units AM, 20 units PM) plus orals (Jardiance, allogliptin and metformin)  Tests fingersticks only twice a week, but A1c level is only mildly elevated at 7.6%  Denies hypoglycemic reactions, but ?? adequacy of warning symptoms  Exam--Mild dysarthria, decreased speech fluency.  Lungs are clear.  Heart RR  Fingersticks in the hospital so far are 120-160 on sliding scale only  --Difficult to explain current in-hospital fingersticks given insulin requirements at home--he does not appear to be frankly non-compliant with his diet on the outside.  To start only 12 Lantus, 4 units premeal  --Will have been off the SGLT-2 for a sufficient period before OR, but would still watch for euglycemic ketosis

## 2022-09-23 NOTE — CONSULT NOTE ADULT - SUBJECTIVE AND OBJECTIVE BOX
SUBJECTIVE / OVERNIGHT EVENTS  Patient was seen and examined this morning.     REVIEW OF SYSTEMS  Constitutional:  Negative fever, chills or loss of appetite.  Eyes:  Negative blurry vision or double vision.  Cardiovascular:  Negative for chest pain or palpitations.  Respiratory:  Negative for cough, wheezing, or SOB.   Gastrointestinal:  Negative for nausea, vomiting, diarrhea, constipation, or abdominal pain.  Genitourinary:  Negative frequency, urgency or dysuria.  Neurologic:  No headache, confusion, dizziness, lightheadedness.    PHYSICAL EXAM  Vital Signs Last 24 Hrs  T(C): 36.6 (23 Sep 2022 13:34), Max: 36.6 (23 Sep 2022 13:34)  T(F): 97.9 (23 Sep 2022 13:34), Max: 97.9 (23 Sep 2022 13:34)  HR: 62 (23 Sep 2022 11:45) (58 - 71)  BP: 135/62 (23 Sep 2022 11:45) (135/62 - 190/66)  BP(mean): 89 (23 Sep 2022 11:45) (89 - 119)  RR: 18 (23 Sep 2022 11:45) (16 - 18)  SpO2: 96% (23 Sep 2022 11:45) (96% - 100%)    Parameters below as of 23 Sep 2022 11:45  Patient On (Oxygen Delivery Method): room air        Constitutional: Awake, alert, in no acute distress.   HEENT: Normocephalic, atraumatic, LUIS CARLOS, no proptosis or lid retraction.   Neck: supple, no acanthosis, no thyromegaly or palpable thyroid nodules.  Respiratory: Lungs clear to ausculation bilaterally.   Cardiovascular: regular rhythm, normal S1 and S2, no audible murmurs.   GI: soft, non-tender, non-distended, bowel sounds present, no masses appreciated.  Extremities: No lower extremity edema, peripheral pulses present.   Skin: no rashes.   Psychiatric: AAO x 3. Normal affect/mood.     LABS                        13.5   8.73  )-----------( 239      ( 22 Sep 2022 21:26 )             41.1     09-22    140  |  104  |  24<H>  ----------------------------<  151<H>  4.2   |  21<L>  |  1.44<H>    Ca    9.5      22 Sep 2022 21:26    TPro  7.7  /  Alb  4.1  /  TBili  0.4  /  DBili  x   /  AST  25  /  ALT  25  /  AlkPhos  68  09-22      PT/INR - ( 22 Sep 2022 21:26 )   PT: 12.3 sec;   INR: 1.03          PTT - ( 22 Sep 2022 21:26 )  PTT:31.8 sec    Thyroid Stimulating Hormone, Serum: 2.360 uIU/mL (09-22 @ 21:26)      MEDICATIONS  MEDICATIONS  (STANDING):  aspirin enteric coated 81 milliGRAM(s) Oral daily  atorvastatin 10 milliGRAM(s) Oral at bedtime  dextrose 5%. 1000 milliLiter(s) (50 mL/Hr) IV Continuous <Continuous>  dextrose 5%. 1000 milliLiter(s) (100 mL/Hr) IV Continuous <Continuous>  dextrose 50% Injectable 25 Gram(s) IV Push once  dextrose 50% Injectable 12.5 Gram(s) IV Push once  dextrose 50% Injectable 25 Gram(s) IV Push once  enoxaparin Injectable 75 milliGRAM(s) SubCutaneous every 12 hours  furosemide    Tablet 40 milliGRAM(s) Oral daily  gabapentin 300 milliGRAM(s) Oral every 12 hours  glucagon  Injectable 1 milliGRAM(s) IntraMuscular once  insulin lispro (ADMELOG) corrective regimen sliding scale   SubCutaneous Before meals and at bedtime  isosorbide   mononitrate ER Tablet (IMDUR) 60 milliGRAM(s) Oral daily  lactated ringers. 500 milliLiter(s) (50 mL/Hr) IV Continuous <Continuous>  levothyroxine 50 MICROGram(s) Oral daily  metoprolol tartrate 25 milliGRAM(s) Oral every 12 hours  pantoprazole    Tablet 40 milliGRAM(s) Oral before breakfast  potassium chloride    Tablet ER 20 milliEquivalent(s) Oral daily  sodium chloride 0.9% lock flush 3 milliLiter(s) IV Push every 8 hours    MEDICATIONS  (PRN):  dextrose Oral Gel 15 Gram(s) Oral once PRN Blood Glucose LESS THAN 70 milliGRAM(s)/deciliter      CAPILLARY BLOOD GLUCOSE & INSULIN RECEIVED  Yesterday  - Dinner FSG: *** mg/dL = *** units of premeal Lispro + *** units of Lispro sliding scale.   - Bedtime FSG: *** mg/dL = *** units of Lantus + *** units Lispro sliding scale.     Today  - Breakfast FSG: *** mg/dL = *** units of premeal Lispro + *** units of Lispro sliding scale.   - Lunch FSG: *** mg/dL = *** units of premeal Lispro + *** units of Lispro sliding scale.     CAPILLARY BLOOD GLUCOSE      POCT Blood Glucose.: 158 mg/dL (23 Sep 2022 11:56)  POCT Blood Glucose.: 168 mg/dL (23 Sep 2022 07:04)  POCT Blood Glucose.: 125 mg/dL (22 Sep 2022 21:26)      ASSESSMENT / RECOMMENDATIONS      A1C: ***   Weight: ***   BMI: ***  Creatinine: ***  GFR: ***  Ejection Fraction: ***    # Type 2 diabetes mellitus  - Please continue lantus *** units at bedtime.   - Continue lispro *** units before each meal.  - Continue lispro moderate / low dose sliding scale four times daily with meals and at bedtime.  - Patient's fingerstick glucose goal is 100-180 mg/dL.    - For discharge, patient can ***.    - Patient can follow up at discharge with [facility] by calling [phone] to make an appointment.      Thank you for allowing us to participate in the care of ***.    Will continue to monitor.       Case discussed with Dr. Chamorro. Primary team updated.       Gabriel Zhu    Endocrinology Fellow    Service Pager: [phone]

## 2022-09-23 NOTE — CONSULT NOTE ADULT - SUBJECTIVE AND OBJECTIVE BOX
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 During workup patient had Coronary Catheterization showing pLAD 80% stenosis, Diagonal 1 70% stenosis, Diagonal 2 80% stenosis, Ramus 70% stenosis, OM1 90% stenosis, mRCA 85% stenosis, EF 60%. Patient now transferred to [facility] under the care of Dr. Herrera, in setting of multi vessel CAD, for further evaluation and management.    Patient seen at bedside this evening. Endorses no acute complaints. Admits to MCDOWELL, Denies CP, SOB, Palpitations, PND, N/V, Fever, Chills.   Admits to tingling sensation in hands and feet. Right sided weakness form prior stroke.       · 	ALOGLIPTIN 25MG TAB: Last Dose Taken:  , 1 tab(s) orally once a day  · 	ASPIRIN LOW DOSE 81MG EC TAB: Last Dose Taken:  , 1 tab(s) orally once a day  · 	ATORVASTATIN CALCIUM 10MG TAB: Last Dose Taken:  , 1 tab(s) orally once a day  · 	FUROSEMIDE 20MG TAB: Last Dose Taken:  , 1 tab(s) orally once a day  · 	GABAPENTIN 300MG CAP: Last Dose Taken:  , 1 cap(s) orally every 12 hours  · 	JARDIANCE 25MG TAB: Last Dose Taken:  , 1 tab(s) orally once a day  · 	LEVOTHYROXINE SODIUM 50MCG TAB: Last Dose Taken:  , 1 tab(s) orally once a day  · 	LOSARTAN POTASSIUM 25MG TAB: Last Dose Taken:  , 1 tab(s) orally once a day  · 	METFORMIN HYDROCHLORIDE 500MG TAB: Last Dose Taken:  , 1 tab(s) orally every 12 hours  · 	METOPROLOL SUCCINATE ER 25MG ER TAB: Last Dose Taken:  , 1 tab(s) orally once a day  · 	NOVOLIN 70/30 FLEXPEN 70/30 PEN: Last Dose Taken:  , 10 unit(s) subcutaneous every 12 hours      FSG & insulin:  Dinner FSG   Lispro   +    Ate  Bedtime FSG     Lantus      and Lispro         Breakfast FSG   Lispro    +    Ate  Lunch FSG      Lispro    +    Ate      Age at Dx:  How dx:  Hx and duration of insulin:  Current Therapy:  Hx of other regimens:    Home FSG:  Fasting  Lunch  Dinner  Bed    Diet:  Exercise:    Hx of hypoglycemia:  Hx of DKA/HHS:  Complications:  Outpatient Endo:    FH:  DM:  Thyroid:  Autoimmune:  Other:    SH:  Smoking  Etoh:  Recreational Drugs:  Social Life:    PMH & Surgical Hx:  CAD  FH: stroke (Father)  Family history of diabetes mellitus (DM) (Mother)  HTN (hypertension)  HLD (hyperlipidemia)  DM (diabetes mellitus)  Cerebrovascular accident (CVA)  PAD (peripheral artery disease)  CKD (chronic kidney disease)      Current Meds:  aspirin enteric coated 81 milliGRAM(s) Oral daily  atorvastatin 10 milliGRAM(s) Oral at bedtime  dextrose 5%. 1000 milliLiter(s) IV Continuous <Continuous>  dextrose 5%. 1000 milliLiter(s) IV Continuous <Continuous>  dextrose 50% Injectable 25 Gram(s) IV Push once  dextrose 50% Injectable 12.5 Gram(s) IV Push once  dextrose 50% Injectable 25 Gram(s) IV Push once  dextrose Oral Gel 15 Gram(s) Oral once PRN  enoxaparin Injectable 75 milliGRAM(s) SubCutaneous every 12 hours  furosemide    Tablet 40 milliGRAM(s) Oral daily  gabapentin 300 milliGRAM(s) Oral every 12 hours  glucagon  Injectable 1 milliGRAM(s) IntraMuscular once  insulin lispro (ADMELOG) corrective regimen sliding scale   SubCutaneous Before meals and at bedtime  isosorbide   mononitrate ER Tablet (IMDUR) 60 milliGRAM(s) Oral daily  lactated ringers. 500 milliLiter(s) IV Continuous <Continuous>  levothyroxine 50 MICROGram(s) Oral daily  metoprolol tartrate 25 milliGRAM(s) Oral every 12 hours  pantoprazole    Tablet 40 milliGRAM(s) Oral before breakfast  potassium chloride    Tablet ER 20 milliEquivalent(s) Oral daily  sodium chloride 0.9% lock flush 3 milliLiter(s) IV Push every 8 hours      Allergies:  No Known Drug Allergies  shellfish (Rash; Urticaria)      ROS:  Denies the following except as indicated.    General: weight loss/weight gain, decreased appetite, fatigue  Eyes: Blurry vision, double vision, visual changes  ENT: Throat pain, changes in voice,   CV: palpitations, SOB, CP, cough  GI: NVD, difficulty swallowing, abdominal pain  : polyuria, dysuria  Endo: abnormal menses, temperature intolerance, decreased libido  MSK: weakness, joint pain  Skin: rash, dryness, diaphoresis  Heme: Easy bruising, bleeding  Neuro: HA, dizziness, lightheadedness, numbness/ tingling  Psych: Anxiety, Depression    Vital Signs Last 24 Hrs  T(C): 36.6 (23 Sep 2022 13:34), Max: 36.6 (23 Sep 2022 13:34)  T(F): 97.9 (23 Sep 2022 13:34), Max: 97.9 (23 Sep 2022 13:34)  HR: 62 (23 Sep 2022 11:45) (58 - 71)  BP: 135/62 (23 Sep 2022 11:45) (135/62 - 190/66)  BP(mean): 89 (23 Sep 2022 11:45) (89 - 119)  RR: 18 (23 Sep 2022 11:45) (16 - 18)  SpO2: 96% (23 Sep 2022 11:45) (96% - 100%)    Parameters below as of 23 Sep 2022 11:45  Patient On (Oxygen Delivery Method): room air        Weight (kg): 74.6 (09-22 @ 21:00)      Constitutional: wn/wd in NAD.   HEENT: NCAT, MMM, OP clear, EOMI, , no proptosis or lid retraction  Neck: no thyromegaly or palpable thyroid nodules   Respiratory: lungs CTAB.  Cardiovascular: regular rhythm, normal S1 and S2, no audible murmurs, no peripheral edema  GI: soft, NT/ND, no masses/HSM appreciated.  Neurology: no tremors, DTR 2+  Skin: no visible rashes/lesions. no acanthosis nigricans. no hyperlipotrophy. no cushing's stigmata.  Psychiatric: AAO x 3, normal affect/mood.  Ext: radial pulses intact, DP pulses intact, extremities warm, no cyanosis, clubbing or edema.       LABS:                        13.5   8.73  )-----------( 239      ( 22 Sep 2022 21:26 )             41.1     09-22    140  |  104  |  24<H>  ----------------------------<  151<H>  4.2   |  21<L>  |  1.44<H>    Ca    9.5      22 Sep 2022 21:26    TPro  7.7  /  Alb  4.1  /  TBili  0.4  /  DBili  x   /  AST  25  /  ALT  25  /  AlkPhos  68  09-22    PT/INR - ( 22 Sep 2022 21:26 )   PT: 12.3 sec;   INR: 1.03          PTT - ( 22 Sep 2022 21:26 )  PTT:31.8 sec      Thyroid Stimulating Hormone, Serum: 2.360 (09-22 @ 21:26)      RADIOLOGY & ADDITIONAL STUDIES:  CAPILLARY BLOOD GLUCOSE      POCT Blood Glucose.: 158 mg/dL (23 Sep 2022 11:56)  POCT Blood Glucose.: 168 mg/dL (23 Sep 2022 07:04)  POCT Blood Glucose.: 125 mg/dL (22 Sep 2022 21:26)       HPI: 55 yo M with PMHx of HTN, HLD, DM, PAD, CKD, CVA x3 (, , ) hypothyroidism, who originally present to [facility] following an abnormal stress test. During workup patient had Coronary Catheterization showing pLAD 80% stenosis, Diagonal 1 70% stenosis, Diagonal 2 80% stenosis, Ramus 70% stenosis, OM1 90% stenosis, mRCA 85% stenosis, EF 60%. Patient now transferred to [facility] under the care of Dr. Herrera, in setting of multi vessel CAD, for further evaluation and management.    Patient seen at bedside this evening. Endorses no acute complaints. Admits to MCDOWELL, Denies CP, SOB, Palpitations, PND, N/V, Fever, Chills.  He declined . Speaks Georgian. Admits to tingling sensation in hands and feet.   Endocrine consulted for diabetes management A1C 7.6%    FSG & insulin:  Yesterday:  Bedtime FSG  125.  Today:  Breakfast   Lispro 2  Lunch FSG  158    Lispro  2      Year of dx:   Current Therapy: ALOGLIPTIN 25MG TAB, JARDIANCE 25MG TAB (last dose ), METFORMIN HYDROCHLORIDE 500MG TAB: Last Dose Taken:  , 1 tab(s) orally every 12 hours, NOVOLIN 70/30 FLEXPEN 70/30 PEN: 30 units before breakfast and 20 units before dinner, GABAPENTIN 300MG CAP: Last Dose Taken:  , 1 cap(s) orally every 12 hours, LEVOTHYROXINE SODIUM 50MCG TAB  Hx of other regimens: Was on equivalent meds from [facility] until about a week ago    Home FSx/week. measure in Mmol. (Bg/18 = mmol)  Fastin  Diet: Breakfast - sour yogurt, egg, hard bread, baby apple. Lunch - minimal veggies. Dinner - bread, veggie, protein.    Hx of hypoglycemia: Denies  Hx of DKA/HHS: Denies  Complications: + retinopathy s/p lasers. + neuropathy on gabapentin  Outpatient Endo: PCP    FH:  DM: yes  Thyroid: denies  Autoimmune: denies  Other: CV disease    SH:  Smoking: former  Etoh: denies  Recreational Drugs: denies  Social Life: retired [employer] . lives with wife and son.    PMH & Surgical Hx:  CAD  FH: stroke (Father)  Family history of diabetes mellitus (DM) (Mother)  HTN (hypertension)  HLD (hyperlipidemia)  DM (diabetes mellitus)  Cerebrovascular accident (CVA)  PAD (peripheral artery disease)  CKD (chronic kidney disease)      Current Meds:  aspirin enteric coated 81 milliGRAM(s) Oral daily  atorvastatin 10 milliGRAM(s) Oral at bedtime  dextrose 5%. 1000 milliLiter(s) IV Continuous <Continuous>  dextrose 5%. 1000 milliLiter(s) IV Continuous <Continuous>  dextrose 50% Injectable 25 Gram(s) IV Push once  dextrose 50% Injectable 12.5 Gram(s) IV Push once  dextrose 50% Injectable 25 Gram(s) IV Push once  dextrose Oral Gel 15 Gram(s) Oral once PRN  enoxaparin Injectable 75 milliGRAM(s) SubCutaneous every 12 hours  furosemide    Tablet 40 milliGRAM(s) Oral daily  gabapentin 300 milliGRAM(s) Oral every 12 hours  glucagon  Injectable 1 milliGRAM(s) IntraMuscular once  insulin lispro (ADMELOG) corrective regimen sliding scale   SubCutaneous Before meals and at bedtime  isosorbide   mononitrate ER Tablet (IMDUR) 60 milliGRAM(s) Oral daily  lactated ringers. 500 milliLiter(s) IV Continuous <Continuous>  levothyroxine 50 MICROGram(s) Oral daily  metoprolol tartrate 25 milliGRAM(s) Oral every 12 hours  pantoprazole    Tablet 40 milliGRAM(s) Oral before breakfast  potassium chloride    Tablet ER 20 milliEquivalent(s) Oral daily  sodium chloride 0.9% lock flush 3 milliLiter(s) IV Push every 8 hours      Allergies:  No Known Drug Allergies  shellfish (Rash; Urticaria)      ROS:  Denies the following except as indicated.    General: weight loss/weight gain, decreased appetite, fatigue  Eyes: Blurry vision, double vision, visual changes  ENT: Throat pain, changes in voice,   CV: palpitations, SOB, CP, cough  GI: NVD, difficulty swallowing, abdominal pain  : polyuria, dysuria  Endo: abnormal menses, temperature intolerance, decreased libido  MSK: weakness, joint pain  Skin: rash, dryness, diaphoresis  Heme: Easy bruising, bleeding  Neuro: HA, dizziness, lightheadedness, numbness/ tingling  Psych: Anxiety, Depression    Vital Signs Last 24 Hrs  T(C): 36.6 (23 Sep 2022 13:34), Max: 36.6 (23 Sep 2022 13:34)  T(F): 97.9 (23 Sep 2022 13:34), Max: 97.9 (23 Sep 2022 13:34)  HR: 62 (23 Sep 2022 11:45) (58 - 71)  BP: 135/62 (23 Sep 2022 11:45) (135/62 - 190/66)  BP(mean): 89 (23 Sep 2022 11:45) (89 - 119)  RR: 18 (23 Sep 2022 11:45) (16 - 18)  SpO2: 96% (23 Sep 2022 11:45) (96% - 100%)    Parameters below as of 23 Sep 2022 11:45  Patient On (Oxygen Delivery Method): room air        Weight (kg): 74.6 ( @ 21:00)      Constitutional:  NAD.   HEENT: NCAT, MMM, OP clear, EOMI, , no proptosis or lid retraction  Neck: no thyromegaly or palpable thyroid nodules   Respiratory: lungs CTAB.  Cardiovascular: regular rhythm, normal S1 and S2, no audible murmurs, no peripheral edema  GI: soft, NT/ND, no masses/HSM appreciated.  Neurology: no tremors, DTR 2+  Skin: no visible rashes/lesions.   Psychiatric: AAO x 3, normal affect/mood.  Ext: radial pulses intact, DP pulses intact, extremities warm, no cyanosis, clubbing or edema.       LABS:                        13.5   8.73  )-----------( 239      ( 22 Sep 2022 21:26 )             41.1         140  |  104  |  24<H>  ----------------------------<  151<H>  4.2   |  21<L>  |  1.44<H>    Ca    9.5      22 Sep 2022 21:26    TPro  7.7  /  Alb  4.1  /  TBili  0.4  /  DBili  x   /  AST  25  /  ALT  25  /  AlkPhos  68      PT/INR - ( 22 Sep 2022 21:26 )   PT: 12.3 sec;   INR: 1.03          PTT - ( 22 Sep 2022 21:26 )  PTT:31.8 sec      Thyroid Stimulating Hormone, Serum: 2.360 ( @ 21:26)    CAPILLARY BLOOD GLUCOSE  POCT Blood Glucose.: 158 mg/dL (23 Sep 2022 11:56)  POCT Blood Glucose.: 168 mg/dL (23 Sep 2022 07:04)  POCT Blood Glucose.: 125 mg/dL (22 Sep 2022 21:26)

## 2022-09-24 LAB
ANION GAP SERPL CALC-SCNC: 11 MMOL/L — SIGNIFICANT CHANGE UP (ref 5–17)
APPEARANCE UR: CLEAR — SIGNIFICANT CHANGE UP
BILIRUB UR-MCNC: NEGATIVE — SIGNIFICANT CHANGE UP
BUN SERPL-MCNC: 35 MG/DL — HIGH (ref 7–23)
CALCIUM SERPL-MCNC: 9.2 MG/DL — SIGNIFICANT CHANGE UP (ref 8.4–10.5)
CHLORIDE SERPL-SCNC: 101 MMOL/L — SIGNIFICANT CHANGE UP (ref 96–108)
CO2 SERPL-SCNC: 24 MMOL/L — SIGNIFICANT CHANGE UP (ref 22–31)
COLOR SPEC: YELLOW — SIGNIFICANT CHANGE UP
CREAT SERPL-MCNC: 1.6 MG/DL — HIGH (ref 0.5–1.3)
DIFF PNL FLD: NEGATIVE — SIGNIFICANT CHANGE UP
EGFR: 50 ML/MIN/1.73M2 — LOW
GLUCOSE BLDC GLUCOMTR-MCNC: 127 MG/DL — HIGH (ref 70–99)
GLUCOSE BLDC GLUCOMTR-MCNC: 139 MG/DL — HIGH (ref 70–99)
GLUCOSE BLDC GLUCOMTR-MCNC: 188 MG/DL — HIGH (ref 70–99)
GLUCOSE BLDC GLUCOMTR-MCNC: 258 MG/DL — HIGH (ref 70–99)
GLUCOSE SERPL-MCNC: 207 MG/DL — HIGH (ref 70–99)
GLUCOSE UR QL: >=1000
HCT VFR BLD CALC: 37.4 % — LOW (ref 39–50)
HCV RNA FLD QL NAA+PROBE: SIGNIFICANT CHANGE UP
HCV RNA SPEC QL PROBE+SIG AMP: SIGNIFICANT CHANGE UP
HGB BLD-MCNC: 12.3 G/DL — LOW (ref 13–17)
KETONES UR-MCNC: NEGATIVE — SIGNIFICANT CHANGE UP
LEUKOCYTE ESTERASE UR-ACNC: NEGATIVE — SIGNIFICANT CHANGE UP
MAGNESIUM SERPL-MCNC: 2.2 MG/DL — SIGNIFICANT CHANGE UP (ref 1.6–2.6)
MCHC RBC-ENTMCNC: 29 PG — SIGNIFICANT CHANGE UP (ref 27–34)
MCHC RBC-ENTMCNC: 32.9 GM/DL — SIGNIFICANT CHANGE UP (ref 32–36)
MCV RBC AUTO: 88.2 FL — SIGNIFICANT CHANGE UP (ref 80–100)
NITRITE UR-MCNC: NEGATIVE — SIGNIFICANT CHANGE UP
NRBC # BLD: 0 /100 WBCS — SIGNIFICANT CHANGE UP (ref 0–0)
PA ADP PRP-ACNC: 241 PRU — SIGNIFICANT CHANGE UP (ref 194–418)
PH UR: 5.5 — SIGNIFICANT CHANGE UP (ref 5–8)
PLATELET # BLD AUTO: 222 K/UL — SIGNIFICANT CHANGE UP (ref 150–400)
POTASSIUM SERPL-MCNC: 4.2 MMOL/L — SIGNIFICANT CHANGE UP (ref 3.5–5.3)
POTASSIUM SERPL-SCNC: 4.2 MMOL/L — SIGNIFICANT CHANGE UP (ref 3.5–5.3)
PROT UR-MCNC: NEGATIVE MG/DL — SIGNIFICANT CHANGE UP
RBC # BLD: 4.24 M/UL — SIGNIFICANT CHANGE UP (ref 4.2–5.8)
RBC # FLD: 15.9 % — HIGH (ref 10.3–14.5)
SODIUM SERPL-SCNC: 136 MMOL/L — SIGNIFICANT CHANGE UP (ref 135–145)
SP GR SPEC: 1.01 — SIGNIFICANT CHANGE UP (ref 1–1.03)
UROBILINOGEN FLD QL: 0.2 E.U./DL — SIGNIFICANT CHANGE UP
WBC # BLD: 10.78 K/UL — HIGH (ref 3.8–10.5)
WBC # FLD AUTO: 10.78 K/UL — HIGH (ref 3.8–10.5)

## 2022-09-24 PROCEDURE — 71046 X-RAY EXAM CHEST 2 VIEWS: CPT | Mod: 26

## 2022-09-24 PROCEDURE — 99232 SBSQ HOSP IP/OBS MODERATE 35: CPT

## 2022-09-24 RX ORDER — SENNA PLUS 8.6 MG/1
2 TABLET ORAL AT BEDTIME
Refills: 0 | Status: DISCONTINUED | OUTPATIENT
Start: 2022-09-24 | End: 2022-09-28

## 2022-09-24 RX ORDER — HEPARIN SODIUM 5000 [USP'U]/ML
5000 INJECTION INTRAVENOUS; SUBCUTANEOUS EVERY 8 HOURS
Refills: 0 | Status: DISCONTINUED | OUTPATIENT
Start: 2022-09-24 | End: 2022-09-28

## 2022-09-24 RX ADMIN — Medication 25 MILLIGRAM(S): at 17:39

## 2022-09-24 RX ADMIN — SODIUM CHLORIDE 3 MILLILITER(S): 9 INJECTION INTRAMUSCULAR; INTRAVENOUS; SUBCUTANEOUS at 16:33

## 2022-09-24 RX ADMIN — Medication 4 UNIT(S): at 07:01

## 2022-09-24 RX ADMIN — GABAPENTIN 300 MILLIGRAM(S): 400 CAPSULE ORAL at 17:39

## 2022-09-24 RX ADMIN — Medication 2: at 07:00

## 2022-09-24 RX ADMIN — SODIUM CHLORIDE 3 MILLILITER(S): 9 INJECTION INTRAMUSCULAR; INTRAVENOUS; SUBCUTANEOUS at 05:47

## 2022-09-24 RX ADMIN — GABAPENTIN 300 MILLIGRAM(S): 400 CAPSULE ORAL at 05:45

## 2022-09-24 RX ADMIN — INSULIN GLARGINE 12 UNIT(S): 100 INJECTION, SOLUTION SUBCUTANEOUS at 21:48

## 2022-09-24 RX ADMIN — SODIUM CHLORIDE 3 MILLILITER(S): 9 INJECTION INTRAMUSCULAR; INTRAVENOUS; SUBCUTANEOUS at 21:18

## 2022-09-24 RX ADMIN — Medication 25 MILLIGRAM(S): at 05:46

## 2022-09-24 RX ADMIN — SENNA PLUS 2 TABLET(S): 8.6 TABLET ORAL at 21:49

## 2022-09-24 RX ADMIN — ISOSORBIDE MONONITRATE 60 MILLIGRAM(S): 60 TABLET, EXTENDED RELEASE ORAL at 10:15

## 2022-09-24 RX ADMIN — ATORVASTATIN CALCIUM 10 MILLIGRAM(S): 80 TABLET, FILM COATED ORAL at 21:48

## 2022-09-24 RX ADMIN — Medication 4 UNIT(S): at 18:50

## 2022-09-24 RX ADMIN — Medication 20 MILLIEQUIVALENT(S): at 12:49

## 2022-09-24 RX ADMIN — Medication 4 UNIT(S): at 12:36

## 2022-09-24 RX ADMIN — ENOXAPARIN SODIUM 75 MILLIGRAM(S): 100 INJECTION SUBCUTANEOUS at 07:02

## 2022-09-24 RX ADMIN — Medication 40 MILLIGRAM(S): at 05:45

## 2022-09-24 RX ADMIN — Medication 6: at 12:37

## 2022-09-24 RX ADMIN — HEPARIN SODIUM 5000 UNIT(S): 5000 INJECTION INTRAVENOUS; SUBCUTANEOUS at 17:39

## 2022-09-24 RX ADMIN — PANTOPRAZOLE SODIUM 40 MILLIGRAM(S): 20 TABLET, DELAYED RELEASE ORAL at 06:33

## 2022-09-24 RX ADMIN — Medication 50 MICROGRAM(S): at 05:44

## 2022-09-24 RX ADMIN — Medication 81 MILLIGRAM(S): at 12:49

## 2022-09-24 NOTE — PROGRESS NOTE ADULT - ASSESSMENT
55 y/o male with PMHx of HTN, HLD, DM, PAD, CKD, CVA x3 (2013, 2014, 2015 w/ residual right sided weakness), who originally present to [facility] following an abnormal stress test. During workup patient had Coronary Catheterization showing pLAD 80% stenosis, Diagonal 1 70% stenosis, Diagonal 2 80% stenosis, Ramus 70% stenosis, OM1 90% stenosis, mRCA 85% stenosis, EF 60%. On 9/22 he was transferred to [facility] under the care of Dr. Herrera, in setting of multi vessel CAD, for further evaluation and management. On 9/23 he was consented for a CABG with a plan for OR either Monday or Wednesday. Neurology was consulted secondary to CVA history and obtained a baseline neuro exam. Endocrinology consulted with recommendations appreciated. Continue preop planning. Incidental finding hep C, pt states he was unaware.     Plan:    Neurovascular:   -hx of multiple CVAs, residual right sided weakness    -neuro on    -CT head completed   -hx of diabetic neuropathy    -continue with gabapentin     Cardiovascular:   -preop CABG     -PST pending : Pa/lateral  -Hemodynamically stable.   -Monitor: BP, HR, tele    Respiratory:   -Oxygenating well on room air  -Encourage continued use of IS 10x/hr and frequent ambulation  -CXR: no acute pathology, no PTX -- pending PA/lateral     GI:  -new diagnosis of hepatitis C    -pending rna viral panel in AM     -per GI, only needs outpatient follow up   -GI PPX: pantoprazole 40mg daily   -PO Diet: consistent carbs   -Bowel Regimen: senna     Renal / :  -hx of CKD   -diuretic regimen: continue with lasix 40mg daily + potassium  -Continue to monitor renal function: BUN/Cr 35/1.6  -Monitor I/O's daily     Endocrine:    -Hypothyroid    -TSH: 2.36      -continue with levothyroxine 50mcg daily   -Diabetes type II    -continue with lantus 12, lispro 4, RISS     -A1c: 7.6     Hematologic:  -CBC: H/H- 12/37  -Coagulation Panel: WNL   -switched from therapeutic lovenox to heparin subQ     ID:  -Temperature: afebrile   -CBC: WBC- 10.7 (slightly elevated today, will trend)   -Continue to observe for SIRS/Sepsis Syndrome.    Prophylaxis:  -DVT prophylaxis with 5000 SubQ Heparin q8h.  -Continue with SCD's b/l while patient is at rest     Disposition:  -Preop OR for Wednesday    57 y/o male with PMHx of HTN, HLD, DM, PAD, CKD, CVA x3 (2013, 2014, 2015 w/ residual right sided weakness), who originally present to [facility] following an abnormal stress test. During workup patient had Coronary Catheterization showing pLAD 80% stenosis, Diagonal 1 70% stenosis, Diagonal 2 80% stenosis, Ramus 70% stenosis, OM1 90% stenosis, mRCA 85% stenosis, EF 60%. On 9/22 he was transferred to [facility] under the care of Dr. Herrera, in setting of multi vessel CAD, for further evaluation and management. On 9/23 he was consented for a CABG with a plan for OR either Monday or Wednesday. Neurology was consulted secondary to CVA history and obtained a baseline neuro exam. Endocrinology consulted with recommendations appreciated. Continue preop planning. Incidental finding hep C, pt states he was unaware.     Plan:    Neurovascular:   -hx of multiple CVAs, residual right sided weakness    -neuro on    -CT head completed   -hx of diabetic neuropathy    -continue with gabapentin     Cardiovascular:   -preop CABG     -PST pending : Pa/lateral    -continue with ASA, Imdur, Met Tart, Statin   -Hemodynamically stable.   -Monitor: BP, HR, tele    Respiratory:   -Oxygenating well on room air  -Encourage continued use of IS 10x/hr and frequent ambulation  -CXR: no acute pathology, no PTX -- pending PA/lateral     GI:  -new diagnosis of hepatitis C    -pending rna viral panel in AM     -per GI, only needs outpatient follow up   -GI PPX: pantoprazole 40mg daily   -PO Diet: consistent carbs   -Bowel Regimen: senna     Renal / :  -hx of CKD   -diuretic regimen: continue with lasix 40mg daily + potassium  -Continue to monitor renal function: BUN/Cr 35/1.6  -Monitor I/O's daily     Endocrine:    -Hypothyroid    -TSH: 2.36      -continue with levothyroxine 50mcg daily   -Diabetes type II    -continue with lantus 12, lispro 4, RISS     -A1c: 7.6     Hematologic:  -CBC: H/H- 12/37  -Coagulation Panel: WNL   -switched from therapeutic lovenox to heparin subQ     ID:  -Temperature: afebrile   -CBC: WBC- 10.7 (slightly elevated today, will trend)   -Continue to observe for SIRS/Sepsis Syndrome.    Prophylaxis:  -DVT prophylaxis with 5000 SubQ Heparin q8h.  -Continue with SCD's b/l while patient is at rest     Disposition:  -Preop OR for Wednesday

## 2022-09-24 NOTE — PROGRESS NOTE ADULT - SUBJECTIVE AND OBJECTIVE BOX
Planned Date of Surgery: 22                                                                                                            Surgeon/Attending MD: Dr. Roach     Procedure: Preop CABG next week (date TBD)    Subjective: Pt is feeling well this morning, no complaints. States he has not been told/knew he had hepatitis C in the past. Eating/drinking well. No chest pain. Denies any palpitations, SOB, wheezing, abd pain, n/v/d/c, fevers or chills.     HPI:  55 y/o male with PMHx of HTN, HLD, DM, PAD, CKD, CVA x3 (, ,  w/ residual right sided weakness), who originally present to [facility] following an abnormal stress test. During workup patient had Coronary Catheterization showing pLAD 80% stenosis, Diagonal 1 70% stenosis, Diagonal 2 80% stenosis, Ramus 70% stenosis, OM1 90% stenosis, mRCA 85% stenosis, EF 60%. On  he was transferred to [facility] under the care of Dr. Herrera, in setting of multi vessel CAD, for further evaluation and management. On  he was consented for a CABG with a plan for OR either Monday or Wednesday. Neurology was consulted secondary to CVA history and obtained a baseline neuro exam. Endocrinology consulted with recommendations appreciated. Continue preop planning. Incidental finding hep C, pt states he was unaware.     PAST MEDICAL & SURGICAL HISTORY:  HTN (hypertension)  HLD (hyperlipidemia)  DM (diabetes mellitus)  Cerebrovascular accident (CVA)  PAD (peripheral artery disease)  CKD (chronic kidney disease)  No significant past surgical history    No Known Drug Allergies  shellfish (Rash; Urticaria)    Vitals:  T(C): 36.7 (22 @ 08:23), Max: 37.1 (22 @ 19:00)  HR: 75 (22 @ 08:23) (65 - 86)  BP: 105/58 (22 @ 08:23) (104/61 - 137/67)  RR: 18 (22 @ 08:23) (16 - 18)  SpO2: 98% (22 @ 08:23) (95% - 100%)    PHYSICAL EXAM:  General: well appearing resting in bed in NAD   Neurological: AOx3. Motor skills grossly intact  Cardiovascular: Normal S1/S2. Regular rate/rhythm. No murmurs  Respiratory: Lungs CTA bilaterally. No wheezing or rales  Gastrointestinal: +BS in all 4 quadrants. Non-distended. Soft. Non-tender  Extremities: Strength 5/5 b/l upper/lower extremities. Sensation grossly intact upper/lower extremities. No edema. No calf tenderness.  Vascular: Radial 2+bilaterally, DP 2+ b/l  Incision Sites: none     MEDICATIONS  (STANDING):  aspirin enteric coated 81 milliGRAM(s) Oral daily  atorvastatin 10 milliGRAM(s) Oral at bedtime  enoxaparin Injectable 75 milliGRAM(s) SubCutaneous every 12 hours  furosemide    Tablet 40 milliGRAM(s) Oral daily  gabapentin 300 milliGRAM(s) Oral every 12 hours  glucagon  Injectable 1 milliGRAM(s) IntraMuscular once  insulin glargine Injectable (LANTUS) 12 Unit(s) SubCutaneous at bedtime  insulin lispro (ADMELOG) corrective regimen sliding scale   SubCutaneous Before meals and at bedtime  insulin lispro Injectable (ADMELOG) 4 Unit(s) SubCutaneous three times a day before meals  isosorbide   mononitrate ER Tablet (IMDUR) 60 milliGRAM(s) Oral daily  lactated ringers. 500 milliLiter(s) (50 mL/Hr) IV Continuous <Continuous>  levothyroxine 50 MICROGram(s) Oral daily  metoprolol tartrate 25 milliGRAM(s) Oral every 12 hours  pantoprazole    Tablet 40 milliGRAM(s) Oral before breakfast  potassium chloride    Tablet ER 20 milliEquivalent(s) Oral daily  sodium chloride 0.9% lock flush 3 milliLiter(s) IV Push every 8 hours    MEDICATIONS  (PRN):  dextrose Oral Gel 15 Gram(s) Oral once PRN Blood Glucose LESS THAN 70 milliGRAM(s)/deciliter    On Beta Blocker? yes    Labs:                        12.3   10.78 )-----------( 222      ( 24 Sep 2022 05:30 )             37.4     -    136  |  101  |  35<H>  ----------------------------<  207<H>  4.2   |  24  |  1.60<H>    Ca    9.2      24 Sep 2022 05:30  Mg     2.2     -    TPro  7.7  /  Alb  4.1  /  TBili  0.4  /  DBili  x   /  AST  25  /  ALT  25  /  AlkPhos  68      PT/INR - ( 22 Sep 2022 21:26 )   PT: 12.3 sec;   INR: 1.03     PTT - ( 22 Sep 2022 21:26 )  PTT:31.8 sec    Urinalysis Basic - ( 23 Sep 2022 15:32 )    Color: Yellow / Appearance: Clear / S.015 / pH: x  Gluc: x / Ketone: NEGATIVE  / Bili: Negative / Urobili: 0.2 E.U./dL   Blood: x / Protein: NEGATIVE mg/dL / Nitrite: NEGATIVE   Leuk Esterase: NEGATIVE / RBC: x / WBC x   Sq Epi: x / Non Sq Epi: x / Bacteria: x      ABO Interpretation: A (22 @ 22:12)      CARDIAC MARKERS ( 22 Sep 2022 21:26 )  x     / 0.01 ng/mL / 150 U/L / x     / 3.8 ng/mL    Preoperative Checklist: (x = completed, n/a = not applicable, p = pending)  [ x ] ECHO  [ p ] PA/Lateral CXR  [ x ] Carotid Duplex  [ x ] CT imaging  [ x ] PFTs  [ x ] UA  [ x ] Baseline Labs (CMP, CBC w/ diff, PTT, PT/INR)  [ x ] A1c  [ x ] TSH  [ x ] Lipid panel  [ x ] Cardiac enzymes  [ x ] Pro BNP  [ x ] EKG   [ x ] T&S 1  [ p ] T&S 2 (within 72 hours)  [ p ] COVID PCR (within 72 hours)  [ n/a ] Room air ABG  [ x ] P2Y12  [ n/a ] bHCG  [ x ] Consents    Abnormal/Noteworthy Preoperative Testing Results:   n/a

## 2022-09-25 LAB
ANION GAP SERPL CALC-SCNC: 11 MMOL/L — SIGNIFICANT CHANGE UP (ref 5–17)
BUN SERPL-MCNC: 41 MG/DL — HIGH (ref 7–23)
CALCIUM SERPL-MCNC: 8.9 MG/DL — SIGNIFICANT CHANGE UP (ref 8.4–10.5)
CHLORIDE SERPL-SCNC: 103 MMOL/L — SIGNIFICANT CHANGE UP (ref 96–108)
CO2 SERPL-SCNC: 22 MMOL/L — SIGNIFICANT CHANGE UP (ref 22–31)
CREAT SERPL-MCNC: 1.57 MG/DL — HIGH (ref 0.5–1.3)
EGFR: 51 ML/MIN/1.73M2 — LOW
GLUCOSE BLDC GLUCOMTR-MCNC: 147 MG/DL — HIGH (ref 70–99)
GLUCOSE BLDC GLUCOMTR-MCNC: 192 MG/DL — HIGH (ref 70–99)
GLUCOSE BLDC GLUCOMTR-MCNC: 225 MG/DL — HIGH (ref 70–99)
GLUCOSE BLDC GLUCOMTR-MCNC: 252 MG/DL — HIGH (ref 70–99)
GLUCOSE SERPL-MCNC: 177 MG/DL — HIGH (ref 70–99)
HCT VFR BLD CALC: 37.6 % — LOW (ref 39–50)
HCV AB S/CO SERPL IA: 1.04 S/CO — HIGH
HCV AB SERPL-IMP: REACTIVE
HGB BLD-MCNC: 12.5 G/DL — LOW (ref 13–17)
MAGNESIUM SERPL-MCNC: 2.2 MG/DL — SIGNIFICANT CHANGE UP (ref 1.6–2.6)
MCHC RBC-ENTMCNC: 28.9 PG — SIGNIFICANT CHANGE UP (ref 27–34)
MCHC RBC-ENTMCNC: 33.2 GM/DL — SIGNIFICANT CHANGE UP (ref 32–36)
MCV RBC AUTO: 86.8 FL — SIGNIFICANT CHANGE UP (ref 80–100)
NRBC # BLD: 0 /100 WBCS — SIGNIFICANT CHANGE UP (ref 0–0)
PLATELET # BLD AUTO: 222 K/UL — SIGNIFICANT CHANGE UP (ref 150–400)
POTASSIUM SERPL-MCNC: 4.2 MMOL/L — SIGNIFICANT CHANGE UP (ref 3.5–5.3)
POTASSIUM SERPL-SCNC: 4.2 MMOL/L — SIGNIFICANT CHANGE UP (ref 3.5–5.3)
RBC # BLD: 4.33 M/UL — SIGNIFICANT CHANGE UP (ref 4.2–5.8)
RBC # FLD: 15.9 % — HIGH (ref 10.3–14.5)
SODIUM SERPL-SCNC: 136 MMOL/L — SIGNIFICANT CHANGE UP (ref 135–145)
WBC # BLD: 8.21 K/UL — SIGNIFICANT CHANGE UP (ref 3.8–10.5)
WBC # FLD AUTO: 8.21 K/UL — SIGNIFICANT CHANGE UP (ref 3.8–10.5)

## 2022-09-25 PROCEDURE — 99232 SBSQ HOSP IP/OBS MODERATE 35: CPT

## 2022-09-25 RX ADMIN — Medication 4: at 21:55

## 2022-09-25 RX ADMIN — Medication 25 MILLIGRAM(S): at 17:23

## 2022-09-25 RX ADMIN — Medication 4 UNIT(S): at 12:18

## 2022-09-25 RX ADMIN — Medication 6: at 12:11

## 2022-09-25 RX ADMIN — GABAPENTIN 300 MILLIGRAM(S): 400 CAPSULE ORAL at 06:28

## 2022-09-25 RX ADMIN — Medication 2: at 07:12

## 2022-09-25 RX ADMIN — SENNA PLUS 2 TABLET(S): 8.6 TABLET ORAL at 21:58

## 2022-09-25 RX ADMIN — SODIUM CHLORIDE 3 MILLILITER(S): 9 INJECTION INTRAMUSCULAR; INTRAVENOUS; SUBCUTANEOUS at 14:20

## 2022-09-25 RX ADMIN — HEPARIN SODIUM 5000 UNIT(S): 5000 INJECTION INTRAVENOUS; SUBCUTANEOUS at 17:44

## 2022-09-25 RX ADMIN — Medication 81 MILLIGRAM(S): at 12:11

## 2022-09-25 RX ADMIN — Medication 4 UNIT(S): at 07:13

## 2022-09-25 RX ADMIN — HEPARIN SODIUM 5000 UNIT(S): 5000 INJECTION INTRAVENOUS; SUBCUTANEOUS at 09:51

## 2022-09-25 RX ADMIN — ATORVASTATIN CALCIUM 10 MILLIGRAM(S): 80 TABLET, FILM COATED ORAL at 21:59

## 2022-09-25 RX ADMIN — ISOSORBIDE MONONITRATE 60 MILLIGRAM(S): 60 TABLET, EXTENDED RELEASE ORAL at 09:49

## 2022-09-25 RX ADMIN — Medication 4 UNIT(S): at 17:23

## 2022-09-25 RX ADMIN — SODIUM CHLORIDE 3 MILLILITER(S): 9 INJECTION INTRAMUSCULAR; INTRAVENOUS; SUBCUTANEOUS at 06:59

## 2022-09-25 RX ADMIN — PANTOPRAZOLE SODIUM 40 MILLIGRAM(S): 20 TABLET, DELAYED RELEASE ORAL at 06:29

## 2022-09-25 RX ADMIN — Medication 50 MICROGRAM(S): at 06:29

## 2022-09-25 RX ADMIN — Medication 25 MILLIGRAM(S): at 06:30

## 2022-09-25 RX ADMIN — HEPARIN SODIUM 5000 UNIT(S): 5000 INJECTION INTRAVENOUS; SUBCUTANEOUS at 06:29

## 2022-09-25 RX ADMIN — SODIUM CHLORIDE 3 MILLILITER(S): 9 INJECTION INTRAMUSCULAR; INTRAVENOUS; SUBCUTANEOUS at 21:34

## 2022-09-25 RX ADMIN — INSULIN GLARGINE 12 UNIT(S): 100 INJECTION, SOLUTION SUBCUTANEOUS at 21:54

## 2022-09-25 RX ADMIN — GABAPENTIN 300 MILLIGRAM(S): 400 CAPSULE ORAL at 17:23

## 2022-09-25 NOTE — PROGRESS NOTE ADULT - SUBJECTIVE AND OBJECTIVE BOX
WEEKEND/OVERNIGHT EVENTS:  Pt seen and examined at bedside. Tolerating PO meals w/o issues. No acute events.      Year of dx:   Current Therapy: ALOGLIPTIN 25MG TAB, JARDIANCE 25MG TAB (last dose ), METFORMIN HYDROCHLORIDE 500MG TAB: Last Dose Taken:  , 1 tab(s) orally every 12 hours, NOVOLIN 70/30 FLEXPEN 70/30 PEN: 30 units before breakfast and 20 units before dinner, GABAPENTIN 300MG CAP: Last Dose Taken:  , 1 cap(s) orally every 12 hours, LEVOTHYROXINE SODIUM 50MCG TAB  Hx of other regimens: Was on equivalent meds from [facility] until about a week ago    Home FSx/week. measure in Mmol. (Bg/18 = mmol)  Fastin  Diet: Breakfast - sour yogurt, egg, hard bread, baby apple. Lunch - minimal veggies. Dinner - bread, veggie, protein.    Hx of hypoglycemia: Denies  Hx of DKA/HHS: Denies  Complications: + retinopathy s/p lasers. + neuropathy on gabapentin  Outpatient Endo: PCP    FH:  DM: yes  Thyroid: denies  Autoimmune: denies  Other: CV disease    SH:  Smoking: former  Etoh: denies  Recreational Drugs: denies  Social Life: retired [employer] . lives with wife and son.    PMH & Surgical Hx:  CAD  FH: stroke (Father)  Family history of diabetes mellitus (DM) (Mother)  HTN (hypertension)  HLD (hyperlipidemia)  DM (diabetes mellitus)  Cerebrovascular accident (CVA)  PAD (peripheral artery disease)  CKD (chronic kidney disease)      MEDICATIONS  (STANDING):  aspirin enteric coated 81 milliGRAM(s) Oral daily  atorvastatin 10 milliGRAM(s) Oral at bedtime  dextrose 5%. 1000 milliLiter(s) (50 mL/Hr) IV Continuous <Continuous>  dextrose 5%. 1000 milliLiter(s) (100 mL/Hr) IV Continuous <Continuous>  dextrose 50% Injectable 25 Gram(s) IV Push once  dextrose 50% Injectable 12.5 Gram(s) IV Push once  dextrose 50% Injectable 25 Gram(s) IV Push once  gabapentin 300 milliGRAM(s) Oral every 12 hours  glucagon  Injectable 1 milliGRAM(s) IntraMuscular once  heparin   Injectable 5000 Unit(s) SubCutaneous every 8 hours  insulin glargine Injectable (LANTUS) 12 Unit(s) SubCutaneous at bedtime  insulin lispro (ADMELOG) corrective regimen sliding scale   SubCutaneous Before meals and at bedtime  insulin lispro Injectable (ADMELOG) 4 Unit(s) SubCutaneous three times a day before meals  isosorbide   mononitrate ER Tablet (IMDUR) 60 milliGRAM(s) Oral daily  levothyroxine 50 MICROGram(s) Oral daily  metoprolol tartrate 25 milliGRAM(s) Oral every 12 hours  pantoprazole    Tablet 40 milliGRAM(s) Oral before breakfast  senna 2 Tablet(s) Oral at bedtime  sodium chloride 0.9% lock flush 3 milliLiter(s) IV Push every 8 hours        Allergies:  No Known Drug Allergies  shellfish (Rash; Urticaria)      ROS:  Denies the following except as indicated.    General: weight loss/weight gain, decreased appetite, fatigue  Eyes: Blurry vision, double vision, visual changes  ENT: Throat pain, changes in voice,   CV: palpitations, SOB, CP, cough  GI: NVD, difficulty swallowing, abdominal pain  : polyuria, dysuria  Endo: abnormal menses, temperature intolerance, decreased libido  MSK: weakness, joint pain  Skin: rash, dryness, diaphoresis  Heme: Easy bruising, bleeding  Neuro: HA, dizziness, lightheadedness, numbness/ tingling  Psych: Anxiety, Depression    Vital Signs Last 24 Hrs  T(C): 36.3 (25 Sep 2022 19:56), Max: 36.6 (25 Sep 2022 01:32)  T(F): 97.4 (25 Sep 2022 19:56), Max: 97.9 (25 Sep 2022 01:32)  HR: 70 (25 Sep 2022 22:00) (64 - 74)  BP: 116/55 (25 Sep 2022 22:00) (110/58 - 135/63)  BP(mean): 78 (25 Sep 2022 22:00) (78 - 91)  RR: 18 (25 Sep 2022 22:00) (17 - 19)  SpO2: 97% (25 Sep 2022 22:00) (95% - 98%)    Parameters below as of 25 Sep 2022 22:00  Patient On (Oxygen Delivery Method): room air        Parameters below as of 23 Sep 2022 11:45  Patient On (Oxygen Delivery Method): room air        Weight (kg): 74.6 ( @ 21:00)      Constitutional:  NAD.   HEENT: NCAT, MMM, OP clear, EOMI, , no proptosis or lid retraction  Neck: no thyromegaly or palpable thyroid nodules   Respiratory: lungs CTAB.  Cardiovascular: regular rhythm, normal S1 and S2, no audible murmurs, no peripheral edema  GI: soft, NT/ND, no masses/HSM appreciated.  Neurology: no tremors, DTR 2+  Skin: no visible rashes/lesions.   Psychiatric: AAO x 3, normal affect/mood.  Ext: radial pulses intact, DP pulses intact, extremities warm, no cyanosis, clubbing or edema.       LABS:                        13.5   8.73  )-----------( 239      ( 22 Sep 2022 21:26 )             41.1         140  |  104  |  24<H>  ----------------------------<  151<H>  4.2   |  21<L>  |  1.44<H>    Ca    9.5      22 Sep 2022 21:26    TPro  7.7  /  Alb  4.1  /  TBili  0.4  /  DBili  x   /  AST  25  /  ALT  25  /  AlkPhos  68      PT/INR - ( 22 Sep 2022 21:26 )   PT: 12.3 sec;   INR: 1.03          PTT - ( 22 Sep 2022 21:26 )  PTT:31.8 sec      Thyroid Stimulating Hormone, Serum: 2.360 ( @ 21:26)    CAPILLARY BLOOD GLUCOSE      POCT Blood Glucose.: 225 mg/dL (25 Sep 2022 21:17)  POCT Blood Glucose.: 147 mg/dL (25 Sep 2022 17:09)  POCT Blood Glucose.: 252 mg/dL (25 Sep 2022 12:00)  POCT Blood Glucose.: 192 mg/dL (25 Sep 2022 07:04)

## 2022-09-25 NOTE — PROGRESS NOTE ADULT - SUBJECTIVE AND OBJECTIVE BOX
Planned Date of Surgery: 22                                                                                                            Surgeon/Attending MD: Dr. Roach     Procedure: Preop CABG next week     SUBJECTIVE: Pt is feeling well this morning, disappointed that his surgery won't be until Wednesday but is understanding. Denies any CP, palpitations, SOB, wheezing, abd pain, n/v/d/c, fevers or chills.     VITAL SIGNS:  Vital Signs Last 24 Hrs  T(C): 36.1 (25 Sep 2022 08:30), Max: 36.6 (24 Sep 2022 14:22)  T(F): 96.9 (25 Sep 2022 08:30), Max: 97.9 (25 Sep 2022 01:32)  HR: 64 (25 Sep 2022 08:30) (63 - 78)  BP: 116/- (25 Sep 2022 08:30) (110/59 - 135/63)  BP(mean): 85 (25 Sep 2022 08:30) (75 - 91)  RR: 17 (25 Sep 2022 08:30) (16 - 18)  SpO2: 98% (25 Sep 2022 08:30) (95% - 99%)    Parameters below as of 25 Sep 2022 08:30  Patient On (Oxygen Delivery Method): room air      I&O's Detail    24 Sep 2022 07:01  -  25 Sep 2022 07:00  --------------------------------------------------------  IN:    Oral Fluid: 500 mL  Total IN: 500 mL    OUT:  Total OUT: 0 mL    Total NET: 500 mL    CHEST TUBE:  no  SILVIA DRAIN:   no  EPICARDIAL WIRES:  no  STITCHES: no  STAPLES: no  BERGMAN:  no  CENTRAL LINE: no   MIDLINE/PICC: no  WOUND VAC: no    PHYSICAL EXAM:  General: well appearing sitting in chair in NAD   Neurological: AOx3. Motor skills grossly intact  Cardiovascular: Normal S1/S2. Regular rate/rhythm. No murmurs  Respiratory: Lungs CTA bilaterally. No wheezing or rales  Gastrointestinal: +BS in all 4 quadrants. Non-distended. Soft. Non-tender  Extremities: Strength 5/5 b/l upper/lower extremities. Sensation grossly intact upper/lower extremities. No edema. No calf tenderness.  Vascular: Radial 2+bilaterally, DP 2+ b/l  Incision Sites: none     LABS:                        12.5   8.21  )-----------( 222      ( 25 Sep 2022 05:30 )             37.6           136  |  103  |  41<H>  ----------------------------<  177<H>  4.2   |  22  |  1.57<H>    Ca    8.9      25 Sep 2022 05:30  Mg     2.2           Urinalysis Basic - ( 23 Sep 2022 15:32 )    Color: Yellow / Appearance: Clear / S.015 / pH: x  Gluc: x / Ketone: NEGATIVE  / Bili: Negative / Urobili: 0.2 E.U./dL   Blood: x / Protein: NEGATIVE mg/dL / Nitrite: NEGATIVE   Leuk Esterase: NEGATIVE / RBC: x / WBC x   Sq Epi: x / Non Sq Epi: x / Bacteria: x    MEDICATIONS  (STANDING):  aspirin enteric coated 81 milliGRAM(s) Oral daily  atorvastatin 10 milliGRAM(s) Oral at bedtime  gabapentin 300 milliGRAM(s) Oral every 12 hours  glucagon  Injectable 1 milliGRAM(s) IntraMuscular once  heparin   Injectable 5000 Unit(s) SubCutaneous every 8 hours  insulin glargine Injectable (LANTUS) 12 Unit(s) SubCutaneous at bedtime  insulin lispro (ADMELOG) corrective regimen sliding scale   SubCutaneous Before meals and at bedtime  insulin lispro Injectable (ADMELOG) 4 Unit(s) SubCutaneous three times a day before meals  isosorbide   mononitrate ER Tablet (IMDUR) 60 milliGRAM(s) Oral daily  lactated ringers. 500 milliLiter(s) (50 mL/Hr) IV Continuous <Continuous>  levothyroxine 50 MICROGram(s) Oral daily  metoprolol tartrate 25 milliGRAM(s) Oral every 12 hours  pantoprazole    Tablet 40 milliGRAM(s) Oral before breakfast  senna 2 Tablet(s) Oral at bedtime  sodium chloride 0.9% lock flush 3 milliLiter(s) IV Push every 8 hours    MEDICATIONS  (PRN):  dextrose Oral Gel 15 Gram(s) Oral once PRN Blood Glucose LESS THAN 70 milliGRAM(s)/deciliter    RADIOLOGY & ADDITIONAL TESTS:  < from: Xray Chest 2 Views PA/Lat (22 @ 17:19) >  IMPRESSION:  Lines/devices: None.  Lungs/pleura: Clear Lungs. No pneumothorax. No pleural effusions.  Cardiomediastinal silhouette: Within normal limits.  Other: Normal osseous structures.  < end of copied text >    Preoperative Checklist: (x = completed, n/a = not applicable, p = pending)  [ x ] ECHO  [ x ] PA/Lateral CXR  [ x ] Carotid Duplex  [ x ] CT imaging  [ x ] PFTs  [ x ] UA  [ x ] Baseline Labs (CMP, CBC w/ diff, PTT, PT/INR)  [ x ] A1c  [ x ] TSH  [ x ] Lipid panel  [ x ] Cardiac enzymes  [ x ] Pro BNP  [ x ] EKG   [ x ] T&S 1  [ p ] T&S 2 (within 72 hours)  [ p ] COVID PCR (within 72 hours)  [ n/a (confirmed with Dr. Herrera ] Room air ABG  [ x ] P2Y12  [ n/a ] bHCG  [ x ] Consents

## 2022-09-25 NOTE — PROGRESS NOTE ADULT - ASSESSMENT
55 y/o male with PMHx of HTN, HLD, DM, PAD, CKD, CVA x3 (2013, 2014, 2015 w/ residual right sided weakness), who originally present to [facility] following an abnormal stress test. During workup patient had Coronary Catheterization showing pLAD 80% stenosis, Diagonal 1 70% stenosis, Diagonal 2 80% stenosis, Ramus 70% stenosis, OM1 90% stenosis, mRCA 85% stenosis, EF 60%. On 9/22 he was transferred to [facility] under the care of Dr. Herrera, in setting of multi vessel CAD, for further evaluation and management. On 9/23 he was consented for a CABG with a plan for OR Wednesday. Neurology was consulted secondary to CVA history and obtained a baseline neuro exam. Endocrinology consulted with recommendations appreciated. Continue preop planning. Incidental finding hep C, pt states he was unaware.     Plan:  Neurovascular:   -hx of multiple CVAs, residual right sided weakness    -neuro on    -CT head completed   -hx of diabetic neuropathy    -continue with gabapentin     Cardiovascular:   -preop CABG, OR Wednesday     -PST completed    -continue with ASA, Imdur, Met Tart, Statin     -US of legs and radial to be completed today results will be in handoff/chart note  -Hemodynamically stable.   -Monitor: BP, HR, tele    Respiratory:   -Oxygenating well on room air  -Encourage continued use of IS 10x/hr and frequent ambulation  -CXR: no acute pathology, no PTX -- pending PA/lateral     GI:  -new diagnosis of hepatitis C    -RNA panel is negative    -per GI, only needs outpatient follow up with GI or PCP, nothing to do inpatient  -GI PPX: pantoprazole 40mg daily   -PO Diet: consistent carbs   -Bowel Regimen: senna     Renal / :  -hx of CKD (baseline Cr 1.4-1.6)  -diuretic regimen: continue with lasix 40mg daily + potassium  -Continue to monitor renal function: BUN/Cr 41/1.51  -Monitor I/O's daily     Endocrine:    -Hypothyroid    -TSH: 2.36      -continue with levothyroxine 50mcg daily   -Diabetes type II    -continue glucose monitoring    -continue with lantus 12, lispro 4, RISS     -A1c: 7.6     Hematologic:  -CBC: H/H- 12/37  -Coagulation Panel: WNL   -P2Y12 WNL normal  -switched from therapeutic lovenox to heparin subQ     ID:  -Temperature: afebrile   -CBC: WBC- 8 (slightly elevated today, will trend)   -Continue to observe for SIRS/Sepsis Syndrome.    Prophylaxis:  -DVT prophylaxis with 5000 SubQ Heparin q8h.  -Continue with SCD's b/l while patient is at rest     Disposition:  -Preop OR for Wednesday

## 2022-09-25 NOTE — PROGRESS NOTE ADULT - ASSESSMENT
57 yo M with PMHx of HTN, HLD, DM, PAD, CKD, CVA x3 (2013, 2014, 2015) with residual right sided weakness, who originally present to [facility] following an abnormal stress test. During workup patient had Coronary Catheterization showing pLAD 80% stenosis, Diagonal 1 70% stenosis, Diagonal 2 80% stenosis, Ramus 70% stenosis, OM1 90% stenosis, mRCA 85% stenosis, EF 60%. Patient now transferred to [facility] under the care of Dr. Herrera, in setting of multi vessel CAD, for further evaluation and management.      A1C: 7.6%  Weight: 75kg  Cr/GRF: 1.4/57  EF 55-60%    1) PLease increase Glargine insulin to 16 units tonight  2) Please increase bolus insulin to 6 units TIDAC + continue NISS  Endocrine to follow

## 2022-09-26 LAB
ANION GAP SERPL CALC-SCNC: 11 MMOL/L — SIGNIFICANT CHANGE UP (ref 5–17)
APTT BLD: 44.2 SEC — HIGH (ref 27.5–35.5)
BUN SERPL-MCNC: 37 MG/DL — HIGH (ref 7–23)
CALCIUM SERPL-MCNC: 9.2 MG/DL — SIGNIFICANT CHANGE UP (ref 8.4–10.5)
CHLORIDE SERPL-SCNC: 106 MMOL/L — SIGNIFICANT CHANGE UP (ref 96–108)
CO2 SERPL-SCNC: 22 MMOL/L — SIGNIFICANT CHANGE UP (ref 22–31)
CREAT SERPL-MCNC: 1.39 MG/DL — HIGH (ref 0.5–1.3)
EGFR: 60 ML/MIN/1.73M2 — SIGNIFICANT CHANGE UP
GLUCOSE BLDC GLUCOMTR-MCNC: 164 MG/DL — HIGH (ref 70–99)
GLUCOSE BLDC GLUCOMTR-MCNC: 207 MG/DL — HIGH (ref 70–99)
GLUCOSE BLDC GLUCOMTR-MCNC: 259 MG/DL — HIGH (ref 70–99)
GLUCOSE BLDC GLUCOMTR-MCNC: 269 MG/DL — HIGH (ref 70–99)
GLUCOSE SERPL-MCNC: 192 MG/DL — HIGH (ref 70–99)
HCT VFR BLD CALC: 38.5 % — LOW (ref 39–50)
HGB BLD-MCNC: 12.7 G/DL — LOW (ref 13–17)
INR BLD: 1.01 — SIGNIFICANT CHANGE UP (ref 0.88–1.16)
MAGNESIUM SERPL-MCNC: 2.4 MG/DL — SIGNIFICANT CHANGE UP (ref 1.6–2.6)
MCHC RBC-ENTMCNC: 28.9 PG — SIGNIFICANT CHANGE UP (ref 27–34)
MCHC RBC-ENTMCNC: 33 GM/DL — SIGNIFICANT CHANGE UP (ref 32–36)
MCV RBC AUTO: 87.5 FL — SIGNIFICANT CHANGE UP (ref 80–100)
NRBC # BLD: 0 /100 WBCS — SIGNIFICANT CHANGE UP (ref 0–0)
PA ADP PRP-ACNC: 322 PRU — SIGNIFICANT CHANGE UP (ref 194–418)
PLATELET # BLD AUTO: 239 K/UL — SIGNIFICANT CHANGE UP (ref 150–400)
POTASSIUM SERPL-MCNC: 4.8 MMOL/L — SIGNIFICANT CHANGE UP (ref 3.5–5.3)
POTASSIUM SERPL-SCNC: 4.8 MMOL/L — SIGNIFICANT CHANGE UP (ref 3.5–5.3)
PROTHROM AB SERPL-ACNC: 12 SEC — SIGNIFICANT CHANGE UP (ref 10.5–13.4)
RBC # BLD: 4.4 M/UL — SIGNIFICANT CHANGE UP (ref 4.2–5.8)
RBC # FLD: 15.7 % — HIGH (ref 10.3–14.5)
SODIUM SERPL-SCNC: 139 MMOL/L — SIGNIFICANT CHANGE UP (ref 135–145)
WBC # BLD: 6.83 K/UL — SIGNIFICANT CHANGE UP (ref 3.8–10.5)
WBC # FLD AUTO: 6.83 K/UL — SIGNIFICANT CHANGE UP (ref 3.8–10.5)

## 2022-09-26 PROCEDURE — 99231 SBSQ HOSP IP/OBS SF/LOW 25: CPT | Mod: GC

## 2022-09-26 PROCEDURE — 99232 SBSQ HOSP IP/OBS MODERATE 35: CPT

## 2022-09-26 RX ORDER — INSULIN LISPRO 100/ML
6 VIAL (ML) SUBCUTANEOUS
Refills: 0 | Status: DISCONTINUED | OUTPATIENT
Start: 2022-09-26 | End: 2022-09-26

## 2022-09-26 RX ORDER — INSULIN LISPRO 100/ML
8 VIAL (ML) SUBCUTANEOUS
Refills: 0 | Status: DISCONTINUED | OUTPATIENT
Start: 2022-09-26 | End: 2022-09-27

## 2022-09-26 RX ORDER — INSULIN GLARGINE 100 [IU]/ML
16 INJECTION, SOLUTION SUBCUTANEOUS AT BEDTIME
Refills: 0 | Status: DISCONTINUED | OUTPATIENT
Start: 2022-09-26 | End: 2022-09-28

## 2022-09-26 RX ADMIN — Medication 8 UNIT(S): at 17:31

## 2022-09-26 RX ADMIN — Medication 25 MILLIGRAM(S): at 07:10

## 2022-09-26 RX ADMIN — HEPARIN SODIUM 5000 UNIT(S): 5000 INJECTION INTRAVENOUS; SUBCUTANEOUS at 17:33

## 2022-09-26 RX ADMIN — ATORVASTATIN CALCIUM 10 MILLIGRAM(S): 80 TABLET, FILM COATED ORAL at 21:57

## 2022-09-26 RX ADMIN — SODIUM CHLORIDE 3 MILLILITER(S): 9 INJECTION INTRAMUSCULAR; INTRAVENOUS; SUBCUTANEOUS at 13:12

## 2022-09-26 RX ADMIN — Medication 6: at 12:38

## 2022-09-26 RX ADMIN — Medication 81 MILLIGRAM(S): at 12:38

## 2022-09-26 RX ADMIN — Medication 4 UNIT(S): at 07:13

## 2022-09-26 RX ADMIN — HEPARIN SODIUM 5000 UNIT(S): 5000 INJECTION INTRAVENOUS; SUBCUTANEOUS at 03:29

## 2022-09-26 RX ADMIN — ISOSORBIDE MONONITRATE 60 MILLIGRAM(S): 60 TABLET, EXTENDED RELEASE ORAL at 09:26

## 2022-09-26 RX ADMIN — SENNA PLUS 2 TABLET(S): 8.6 TABLET ORAL at 21:56

## 2022-09-26 RX ADMIN — INSULIN GLARGINE 16 UNIT(S): 100 INJECTION, SOLUTION SUBCUTANEOUS at 21:58

## 2022-09-26 RX ADMIN — SODIUM CHLORIDE 3 MILLILITER(S): 9 INJECTION INTRAMUSCULAR; INTRAVENOUS; SUBCUTANEOUS at 05:26

## 2022-09-26 RX ADMIN — Medication 2: at 07:13

## 2022-09-26 RX ADMIN — HEPARIN SODIUM 5000 UNIT(S): 5000 INJECTION INTRAVENOUS; SUBCUTANEOUS at 09:26

## 2022-09-26 RX ADMIN — PANTOPRAZOLE SODIUM 40 MILLIGRAM(S): 20 TABLET, DELAYED RELEASE ORAL at 07:10

## 2022-09-26 RX ADMIN — GABAPENTIN 300 MILLIGRAM(S): 400 CAPSULE ORAL at 17:32

## 2022-09-26 RX ADMIN — Medication 6: at 17:30

## 2022-09-26 RX ADMIN — SODIUM CHLORIDE 3 MILLILITER(S): 9 INJECTION INTRAMUSCULAR; INTRAVENOUS; SUBCUTANEOUS at 21:39

## 2022-09-26 RX ADMIN — Medication 25 MILLIGRAM(S): at 17:32

## 2022-09-26 RX ADMIN — Medication 4: at 21:59

## 2022-09-26 RX ADMIN — Medication 50 MICROGRAM(S): at 07:12

## 2022-09-26 RX ADMIN — GABAPENTIN 300 MILLIGRAM(S): 400 CAPSULE ORAL at 07:11

## 2022-09-26 NOTE — PROGRESS NOTE ADULT - ASSESSMENT
55 y/o male with PMHx of HTN, HLD, DM, PAD, CKD, CVA x3 (2013, 2014, 2015 w/ residual right sided weakness), who originally present to [facility] following an abnormal stress test. During workup patient had Coronary Catheterization showing pLAD 80% stenosis, Diagonal 1 70% stenosis, Diagonal 2 80% stenosis, Ramus 70% stenosis, OM1 90% stenosis, mRCA 85% stenosis, EF 60%. On 9/22 he was transferred to [facility] under the care of Dr. Herrera, in setting of multi vessel CAD, for further evaluation and management. On 9/23 he was consented for a CABG with a plan for OR Wednesday. Neurology was consulted secondary to CVA history and obtained a baseline neuro exam. Endocrinology consulted with recommendations appreciated. Continue preop planning. Incidental finding hep C, pt states he was unaware, continued pre op planning for Wednesday, second type and screen pending in AM.     Plan:  Neurovascular:   #hx of multiple CVAs, residual right sided weakness  -neuro stroke on board  -CT head completed   #hx of diabetic neuropathy  -continue with gabapentin     Cardiovascular:   #preop CABG, OR Wednesday   -PST completed  -continue with ASA, Imdur, Met Tart, Statin   -Radial and vein mapping completed, see event note in documents  -Hemodynamically stable.   -Monitor: BP, HR, tele    Respiratory:   -Oxygenating well on room air  -Encourage continued use of IS 10x/hr and frequent ambulation    GI:  #new diagnosis of hepatitis C  -RNA panel is negative  -per GI, only needs outpatient follow up with GI or PCP, nothing to do inpatient  -GI PPX: pantoprazole 40mg daily   -PO Diet: consistent carbs   -Bowel Regimen: senna     Renal / :  #hx of CKD (baseline Cr 1.4-1.6)  -diuretic regimen: continue with lasix 40mg daily + potassium  -Continue to monitor renal function: BUN/Cr 37/1.39  -Monitor I/O's daily     Endocrine:    #Hypothyroid  -TSH: 2.36    -continue with levothyroxine 50mcg daily   #Diabetes type II  -continue glucose monitoring  -continue with lantus 16, lispro 8, RISS   -A1c: 7.6     Hematologic:  -CBC: H/H- 12.7/38.5  -Coagulation Panel: WNL   -P2Y12 WNL normal  -heparin subQ     ID:  -Temperature: afebrile   -CBC: WBC- 6.83  -Continue to observe for SIRS/Sepsis Syndrome.    Prophylaxis:  -DVT prophylaxis with 5000 SubQ Heparin q8h.  -Continue with SCD's b/l while patient is at rest     Disposition:  -Preop OR for Wednesday

## 2022-09-26 NOTE — PROGRESS NOTE ADULT - SUBJECTIVE AND OBJECTIVE BOX
SUBJECTIVE / OVERNIGHT EVENTS  Patient was seen and examined this morning.     REVIEW OF SYSTEMS  Constitutional:  Negative fever, chills or loss of appetite.  Eyes:  Negative blurry vision or double vision.  Cardiovascular:  Negative for chest pain or palpitations.  Respiratory:  Negative for cough, wheezing, or SOB.   Gastrointestinal:  Negative for nausea, vomiting, diarrhea, constipation, or abdominal pain.  Genitourinary:  Negative frequency, urgency or dysuria.  Neurologic:  No headache, confusion, dizziness, lightheadedness.    PHYSICAL EXAM  Vital Signs Last 24 Hrs  T(C): 36.6 (26 Sep 2022 17:40), Max: 36.6 (26 Sep 2022 05:00)  T(F): 97.8 (26 Sep 2022 17:40), Max: 97.8 (26 Sep 2022 05:00)  HR: 66 (26 Sep 2022 21:37) (65 - 79)  BP: 109/60 (26 Sep 2022 21:37) (109/60 - 135/66)  BP(mean): 78 (26 Sep 2022 21:37) (75 - 92)  RR: 16 (26 Sep 2022 21:37) (16 - 16)  SpO2: 98% (26 Sep 2022 21:37) (97% - 100%)    Parameters below as of 26 Sep 2022 21:37  Patient On (Oxygen Delivery Method): room air        Constitutional: Awake, alert, in no acute distress.   HEENT: Normocephalic, atraumatic, LUIS CARLOS, no proptosis or lid retraction.   Neck: supple, no acanthosis, no thyromegaly or palpable thyroid nodules.  Respiratory: Lungs clear to ausculation bilaterally.   Cardiovascular: regular rhythm, normal S1 and S2, no audible murmurs.   GI: soft, non-tender, non-distended, bowel sounds present, no masses appreciated.  Extremities: No lower extremity edema, peripheral pulses present.   Skin: no rashes.   Psychiatric: AAO x 3. Normal affect/mood.     LABS                        12.7   6.83  )-----------( 239      ( 26 Sep 2022 06:35 )             38.5     09-26    139  |  106  |  37<H>  ----------------------------<  192<H>  4.8   |  22  |  1.39<H>    Ca    9.2      26 Sep 2022 06:35  Mg     2.4     09-26        PT/INR - ( 26 Sep 2022 06:35 )   PT: 12.0 sec;   INR: 1.01          PTT - ( 26 Sep 2022 06:35 )  PTT:44.2 sec    Thyroid Stimulating Hormone, Serum: 2.360 uIU/mL (09-22 @ 21:26)      MEDICATIONS  MEDICATIONS  (STANDING):  aspirin enteric coated 81 milliGRAM(s) Oral daily  atorvastatin 10 milliGRAM(s) Oral at bedtime  dextrose 5%. 1000 milliLiter(s) (50 mL/Hr) IV Continuous <Continuous>  dextrose 5%. 1000 milliLiter(s) (100 mL/Hr) IV Continuous <Continuous>  dextrose 50% Injectable 25 Gram(s) IV Push once  dextrose 50% Injectable 12.5 Gram(s) IV Push once  dextrose 50% Injectable 25 Gram(s) IV Push once  gabapentin 300 milliGRAM(s) Oral every 12 hours  glucagon  Injectable 1 milliGRAM(s) IntraMuscular once  heparin   Injectable 5000 Unit(s) SubCutaneous every 8 hours  insulin glargine Injectable (LANTUS) 16 Unit(s) SubCutaneous at bedtime  insulin lispro (ADMELOG) corrective regimen sliding scale   SubCutaneous Before meals and at bedtime  insulin lispro Injectable (ADMELOG) 8 Unit(s) SubCutaneous three times a day before meals  isosorbide   mononitrate ER Tablet (IMDUR) 60 milliGRAM(s) Oral daily  levothyroxine 50 MICROGram(s) Oral daily  metoprolol tartrate 25 milliGRAM(s) Oral every 12 hours  pantoprazole    Tablet 40 milliGRAM(s) Oral before breakfast  senna 2 Tablet(s) Oral at bedtime  sodium chloride 0.9% lock flush 3 milliLiter(s) IV Push every 8 hours    MEDICATIONS  (PRN):  dextrose Oral Gel 15 Gram(s) Oral once PRN Blood Glucose LESS THAN 70 milliGRAM(s)/deciliter      CAPILLARY BLOOD GLUCOSE & INSULIN RECEIVED  Yesterday  - Dinner FSG: *** mg/dL = *** units of premeal Lispro + *** units of Lispro sliding scale.   - Bedtime FSG: *** mg/dL = *** units of Lantus + *** units Lispro sliding scale.     Today  - Breakfast FSG: *** mg/dL = *** units of premeal Lispro + *** units of Lispro sliding scale.   - Lunch FSG: *** mg/dL = *** units of premeal Lispro + *** units of Lispro sliding scale.     CAPILLARY BLOOD GLUCOSE      POCT Blood Glucose.: 207 mg/dL (26 Sep 2022 21:24)  POCT Blood Glucose.: 259 mg/dL (26 Sep 2022 17:25)  POCT Blood Glucose.: 269 mg/dL (26 Sep 2022 11:53)  POCT Blood Glucose.: 164 mg/dL (26 Sep 2022 06:46)      ASSESSMENT / RECOMMENDATIONS      A1C: ***   Weight: ***   BMI: ***  Creatinine: ***  GFR: ***  Ejection Fraction: ***    # Type 2 diabetes mellitus  - Please continue lantus *** units at bedtime.   - Continue lispro *** units before each meal.  - Continue lispro moderate / low dose sliding scale four times daily with meals and at bedtime.  - Patient's fingerstick glucose goal is 100-180 mg/dL.    - For discharge, patient can ***.    - Patient can follow up at discharge with [facility] by calling [phone] to make an appointment.      Thank you for allowing us to participate in the care of ***.    Will continue to monitor.       Case discussed with Dr. Chamorro. Primary team updated.       Gabriel Zhu    Endocrinology Fellow    Service Pager: [phone]    SUBJECTIVE / OVERNIGHT EVENTS  Patient was seen and examined this morning. Patient continues to have good appetite. For dinner, he ate salmon, salad and beans; for breakfast, he ate cereal, milk, Citizen of [place] toast, one apple, turkey lomas and coffee. He didn't have any concerns or complaints.     REVIEW OF SYSTEMS  Constitutional:  Negative fever, chills or loss of appetite.  Eyes:  Negative blurry vision or double vision.  Cardiovascular:  Negative for chest pain or palpitations.  Respiratory:  Negative for cough, wheezing, or SOB.   Gastrointestinal:  Negative for nausea, vomiting, diarrhea, constipation, or abdominal pain.  Genitourinary:  Negative frequency, urgency or dysuria.  Neurologic:  No headache, confusion, dizziness, lightheadedness.    PHYSICAL EXAM  Vital Signs Last 24 Hrs  T(C): 36.6 (26 Sep 2022 17:40), Max: 36.6 (26 Sep 2022 05:00)  T(F): 97.8 (26 Sep 2022 17:40), Max: 97.8 (26 Sep 2022 05:00)  HR: 66 (26 Sep 2022 21:37) (65 - 79)  BP: 109/60 (26 Sep 2022 21:37) (109/60 - 135/66)  BP(mean): 78 (26 Sep 2022 21:37) (75 - 92)  RR: 16 (26 Sep 2022 21:37) (16 - 16)  SpO2: 98% (26 Sep 2022 21:37) (97% - 100%)    Parameters below as of 26 Sep 2022 21:37  Patient On (Oxygen Delivery Method): room air    Constitutional: Awake, alert, elderly male in no acute distress.   HEENT: Normocephalic, atraumatic, LUIS CARLOS.  Respiratory: Lungs clear to ausculation bilaterally.   Cardiovascular: regular rhythm, normal S1 and S2, no audible murmurs.   GI: soft, non-tender, non-distended, bowel sounds present.  Extremities: No lower extremity edema.  Psychiatric: AAO x 3. Normal affect/mood.     LABS                        12.7   6.83  )-----------( 239      ( 26 Sep 2022 06:35 )             38.5     139  |  106  |  37<H>  ----------------------------<  192<H>  4.8   |  22  |  1.39<H>    Ca    9.2      26 Sep 2022 06:35  Mg     2.4       PT/INR - ( 26 Sep 2022 06:35 )   PT: 12.0 sec;   INR: 1.01     PTT - ( 26 Sep 2022 06:35 )  PTT:44.2 sec  Thyroid Stimulating Hormone, Serum: 2.360 uIU/mL ( @ 21:26)    MEDICATIONS  MEDICATIONS  (STANDING):  aspirin enteric coated 81 milliGRAM(s) Oral daily  atorvastatin 10 milliGRAM(s) Oral at bedtime  dextrose 5%. 1000 milliLiter(s) (50 mL/Hr) IV Continuous <Continuous>  dextrose 5%. 1000 milliLiter(s) (100 mL/Hr) IV Continuous <Continuous>  dextrose 50% Injectable 25 Gram(s) IV Push once  dextrose 50% Injectable 12.5 Gram(s) IV Push once  dextrose 50% Injectable 25 Gram(s) IV Push once  gabapentin 300 milliGRAM(s) Oral every 12 hours  glucagon  Injectable 1 milliGRAM(s) IntraMuscular once  heparin   Injectable 5000 Unit(s) SubCutaneous every 8 hours  insulin glargine Injectable (LANTUS) 16 Unit(s) SubCutaneous at bedtime  insulin lispro (ADMELOG) corrective regimen sliding scale   SubCutaneous Before meals and at bedtime  insulin lispro Injectable (ADMELOG) 8 Unit(s) SubCutaneous three times a day before meals  isosorbide   mononitrate ER Tablet (IMDUR) 60 milliGRAM(s) Oral daily  levothyroxine 50 MICROGram(s) Oral daily  metoprolol tartrate 25 milliGRAM(s) Oral every 12 hours  pantoprazole    Tablet 40 milliGRAM(s) Oral before breakfast  senna 2 Tablet(s) Oral at bedtime  sodium chloride 0.9% lock flush 3 milliLiter(s) IV Push every 8 hours    MEDICATIONS  (PRN):  dextrose Oral Gel 15 Gram(s) Oral once PRN Blood Glucose LESS THAN 70 milliGRAM(s)/deciliter    CAPILLARY BLOOD GLUCOSE & INSULIN RECEIVED  Yesterday  - Dinner FS mg/dL = 4 units of premeal Lispro.  - Bedtime FS mg/dL = 12 units of Lantus + 4 units Lispro sliding scale.     Today  - Breakfast FS mg/dL = 4 units of premeal Lispro + 2 units of Lispro sliding scale.   - Lunch FS mg/dL = 6 units of Lispro sliding scale.     ASSESSMENT / RECOMMENDATIONS  Mr. Bello is a 56 years old male with a past medical history of type 2 diabetes mellitus, hypertension, hyperlipidemia, peripheral arterial disease, chronic kidney disease, CVA (, , , with residual right sided weakness) who originally present to [facility] following an abnormal stress test. During workup patient had LHC showing pLAD 80% stenosis, Diagonal 1 70% stenosis, Diagonal 2 80% stenosis, Ramus 70% stenosis, OM1 90% stenosis, mRCA 85% stenosis, EF 60%. Patient now transferred to [facility] under the care of Dr. Herrera, in setting of multi vessel CAD, for further evaluation and management. Endocrinology following for recommendations regarding diabetes management.     A1C: 7.6%  Weight: 75kg  Cr/GRF: 1.4/57  EF 55-60%    # Type 2 diabetes mellitus  - Please increase lantus to 16 units at bedtime.   - Increase lispro to 8 units before each meal.  - Continue lispro moderate dose sliding scale four times daily with meals and at bedtime.  - Patient's fingerstick glucose goal is 100-180 mg/dL.      Thank you for allowing us to participate in the care of Mr. Bello.  Will continue to monitor.       Case discussed with Dr. Chamorro. Primary team updated.       Gabriel Zhu    Endocrinology Fellow    Service Pager: [phone]

## 2022-09-26 NOTE — PROGRESS NOTE ADULT - SUBJECTIVE AND OBJECTIVE BOX
Patient discussed on morning rounds with Dr. Colon     Operation / Date: 3V CAD pre op for OR on Wednesday     SUBJECTIVE ASSESSMENT:  56y Male seen and examined at bedside with  no complaints. He stated that he feels well. He denies chest pain or pressure, chills, fevers, sweats, abd pain, HA, palpitations, nausea, vomiting or any further associated symptoms.     Vital Signs Last 24 Hrs  T(C): 36.4 (26 Sep 2022 13:49), Max: 36.6 (26 Sep 2022 05:00)  T(F): 97.5 (26 Sep 2022 13:49), Max: 97.8 (26 Sep 2022 05:00)  HR: 79 (26 Sep 2022 12:45) (65 - 79)  BP: 113/67 (26 Sep 2022 12:45) (110/58 - 132/75)  BP(mean): 83 (26 Sep 2022 12:45) (75 - 92)  RR: 16 (26 Sep 2022 12:45) (16 - 19)  SpO2: 100% (26 Sep 2022 12:45) (97% - 100%)    Parameters below as of 26 Sep 2022 12:45  Patient On (Oxygen Delivery Method): room air      I&O's Detail    25 Sep 2022 07:01  -  26 Sep 2022 07:00  --------------------------------------------------------  IN:    Lactated Ringers: 500 mL    Oral Fluid: 600 mL  Total IN: 1100 mL    OUT:    Voided (mL): 900 mL  Total OUT: 900 mL    Total NET: 200 mL    CHEST TUBE:  none   SILVIA DRAIN:  none  EPICARDIAL WIRES: none  TIE DOWNS: none  BERGMAN: none    PHYSICAL EXAM:  GEN: NAD, looks comfortable  Psych: Mood appropriate  Neuro: A&Ox3.  No focal deficits.  Moving all extremities.   HEENT: No obvious abnormalities  CV: S1S2, regular, no murmurs appreciated.  No carotid bruits.  No JVD  Lungs: Clear B/L.  No wheezing, rales or rhonchi  ABD: Soft, non-tender, non-distended.  +Bowel sounds  EXT: Warm and well perfused.  No peripheral edema noted  Musculoskeletal: Moving all extremities with normal ROM, no joint swelling  PV: Pedal pulses dopplerable   Incisions: none    LABS:                        12.7   6.83  )-----------( 239      ( 26 Sep 2022 06:35 )             38.5       PT/INR - ( 26 Sep 2022 06:35 )   PT: 12.0 sec;   INR: 1.01          PTT - ( 26 Sep 2022 06:35 )  PTT:44.2 sec    09-26    139  |  106  |  37<H>  ----------------------------<  192<H>  4.8   |  22  |  1.39<H>    Ca    9.2      26 Sep 2022 06:35  Mg     2.4     09-26    MEDICATIONS  (STANDING):  aspirin enteric coated 81 milliGRAM(s) Oral daily  atorvastatin 10 milliGRAM(s) Oral at bedtime  dextrose 5%. 1000 milliLiter(s) (50 mL/Hr) IV Continuous <Continuous>  dextrose 5%. 1000 milliLiter(s) (100 mL/Hr) IV Continuous <Continuous>  dextrose 50% Injectable 25 Gram(s) IV Push once  dextrose 50% Injectable 12.5 Gram(s) IV Push once  dextrose 50% Injectable 25 Gram(s) IV Push once  gabapentin 300 milliGRAM(s) Oral every 12 hours  glucagon  Injectable 1 milliGRAM(s) IntraMuscular once  heparin   Injectable 5000 Unit(s) SubCutaneous every 8 hours  insulin glargine Injectable (LANTUS) 16 Unit(s) SubCutaneous at bedtime  insulin lispro (ADMELOG) corrective regimen sliding scale   SubCutaneous Before meals and at bedtime  insulin lispro Injectable (ADMELOG) 6 Unit(s) SubCutaneous three times a day before meals  isosorbide   mononitrate ER Tablet (IMDUR) 60 milliGRAM(s) Oral daily  levothyroxine 50 MICROGram(s) Oral daily  metoprolol tartrate 25 milliGRAM(s) Oral every 12 hours  pantoprazole    Tablet 40 milliGRAM(s) Oral before breakfast  senna 2 Tablet(s) Oral at bedtime  sodium chloride 0.9% lock flush 3 milliLiter(s) IV Push every 8 hours    MEDICATIONS  (PRN):  dextrose Oral Gel 15 Gram(s) Oral once PRN Blood Glucose LESS THAN 70 milliGRAM(s)/deciliter    RADIOLOGY & ADDITIONAL TESTS:  < from: Xray Chest 2 Views PA/Lat (09.24.22 @ 17:19) >  IMPRESSION:    Lines/devices: None.    Lungs/pleura: Clear Lungs. No pneumothorax. No pleural effusions.    Cardiomediastinal silhouette: Within normal limits.    Other: Normal osseous structures.    --- End of Report ---    < end of copied text >

## 2022-09-27 ENCOUNTER — TRANSCRIPTION ENCOUNTER (OUTPATIENT)
Age: 57
End: 2022-09-27

## 2022-09-27 LAB
ANION GAP SERPL CALC-SCNC: 9 MMOL/L — SIGNIFICANT CHANGE UP (ref 5–17)
BLD GP AB SCN SERPL QL: NEGATIVE — SIGNIFICANT CHANGE UP
BUN SERPL-MCNC: 33 MG/DL — HIGH (ref 7–23)
CALCIUM SERPL-MCNC: 9.2 MG/DL — SIGNIFICANT CHANGE UP (ref 8.4–10.5)
CHLORIDE SERPL-SCNC: 105 MMOL/L — SIGNIFICANT CHANGE UP (ref 96–108)
CO2 SERPL-SCNC: 22 MMOL/L — SIGNIFICANT CHANGE UP (ref 22–31)
CREAT SERPL-MCNC: 1.51 MG/DL — HIGH (ref 0.5–1.3)
EGFR: 54 ML/MIN/1.73M2 — LOW
GLUCOSE BLDC GLUCOMTR-MCNC: 122 MG/DL — HIGH (ref 70–99)
GLUCOSE BLDC GLUCOMTR-MCNC: 153 MG/DL — HIGH (ref 70–99)
GLUCOSE BLDC GLUCOMTR-MCNC: 227 MG/DL — HIGH (ref 70–99)
GLUCOSE BLDC GLUCOMTR-MCNC: 351 MG/DL — HIGH (ref 70–99)
GLUCOSE SERPL-MCNC: 150 MG/DL — HIGH (ref 70–99)
HCT VFR BLD CALC: 36.8 % — LOW (ref 39–50)
HCV RNA FLD QL NAA+PROBE: SIGNIFICANT CHANGE UP
HCV RNA SPEC QL PROBE+SIG AMP: SIGNIFICANT CHANGE UP
HGB BLD-MCNC: 12.2 G/DL — LOW (ref 13–17)
MAGNESIUM SERPL-MCNC: 2.2 MG/DL — SIGNIFICANT CHANGE UP (ref 1.6–2.6)
MCHC RBC-ENTMCNC: 29.4 PG — SIGNIFICANT CHANGE UP (ref 27–34)
MCHC RBC-ENTMCNC: 33.2 GM/DL — SIGNIFICANT CHANGE UP (ref 32–36)
MCV RBC AUTO: 88.7 FL — SIGNIFICANT CHANGE UP (ref 80–100)
NRBC # BLD: 0 /100 WBCS — SIGNIFICANT CHANGE UP (ref 0–0)
PLATELET # BLD AUTO: 216 K/UL — SIGNIFICANT CHANGE UP (ref 150–400)
POTASSIUM SERPL-MCNC: 4.3 MMOL/L — SIGNIFICANT CHANGE UP (ref 3.5–5.3)
POTASSIUM SERPL-SCNC: 4.3 MMOL/L — SIGNIFICANT CHANGE UP (ref 3.5–5.3)
RBC # BLD: 4.15 M/UL — LOW (ref 4.2–5.8)
RBC # FLD: 15.7 % — HIGH (ref 10.3–14.5)
RH IG SCN BLD-IMP: POSITIVE — SIGNIFICANT CHANGE UP
SARS-COV-2 RNA SPEC QL NAA+PROBE: SIGNIFICANT CHANGE UP
SODIUM SERPL-SCNC: 136 MMOL/L — SIGNIFICANT CHANGE UP (ref 135–145)
WBC # BLD: 7.12 K/UL — SIGNIFICANT CHANGE UP (ref 3.8–10.5)
WBC # FLD AUTO: 7.12 K/UL — SIGNIFICANT CHANGE UP (ref 3.8–10.5)

## 2022-09-27 PROCEDURE — 99231 SBSQ HOSP IP/OBS SF/LOW 25: CPT

## 2022-09-27 PROCEDURE — 93971 EXTREMITY STUDY: CPT | Mod: 26

## 2022-09-27 RX ORDER — CHLORHEXIDINE GLUCONATE 213 G/1000ML
1 SOLUTION TOPICAL ONCE
Refills: 0 | Status: COMPLETED | OUTPATIENT
Start: 2022-09-27 | End: 2022-09-28

## 2022-09-27 RX ORDER — CHLORHEXIDINE GLUCONATE 213 G/1000ML
1 SOLUTION TOPICAL ONCE
Refills: 0 | Status: COMPLETED | OUTPATIENT
Start: 2022-09-27 | End: 2022-09-27

## 2022-09-27 RX ORDER — CHLORHEXIDINE GLUCONATE 213 G/1000ML
1 SOLUTION TOPICAL ONCE
Refills: 0 | Status: COMPLETED | OUTPATIENT
Start: 2022-09-28 | End: 2022-09-28

## 2022-09-27 RX ORDER — INSULIN LISPRO 100/ML
12 VIAL (ML) SUBCUTANEOUS
Refills: 0 | Status: DISCONTINUED | OUTPATIENT
Start: 2022-09-27 | End: 2022-09-28

## 2022-09-27 RX ORDER — CHLORHEXIDINE GLUCONATE 213 G/1000ML
15 SOLUTION TOPICAL ONCE
Refills: 0 | Status: COMPLETED | OUTPATIENT
Start: 2022-09-27 | End: 2022-09-28

## 2022-09-27 RX ADMIN — PANTOPRAZOLE SODIUM 40 MILLIGRAM(S): 20 TABLET, DELAYED RELEASE ORAL at 06:19

## 2022-09-27 RX ADMIN — GABAPENTIN 300 MILLIGRAM(S): 400 CAPSULE ORAL at 05:25

## 2022-09-27 RX ADMIN — CHLORHEXIDINE GLUCONATE 1 APPLICATION(S): 213 SOLUTION TOPICAL at 21:37

## 2022-09-27 RX ADMIN — Medication 10: at 21:38

## 2022-09-27 RX ADMIN — SODIUM CHLORIDE 3 MILLILITER(S): 9 INJECTION INTRAMUSCULAR; INTRAVENOUS; SUBCUTANEOUS at 13:08

## 2022-09-27 RX ADMIN — HEPARIN SODIUM 5000 UNIT(S): 5000 INJECTION INTRAVENOUS; SUBCUTANEOUS at 03:18

## 2022-09-27 RX ADMIN — Medication 25 MILLIGRAM(S): at 05:25

## 2022-09-27 RX ADMIN — SENNA PLUS 2 TABLET(S): 8.6 TABLET ORAL at 21:39

## 2022-09-27 RX ADMIN — HEPARIN SODIUM 5000 UNIT(S): 5000 INJECTION INTRAVENOUS; SUBCUTANEOUS at 18:10

## 2022-09-27 RX ADMIN — Medication 8 UNIT(S): at 12:31

## 2022-09-27 RX ADMIN — HEPARIN SODIUM 5000 UNIT(S): 5000 INJECTION INTRAVENOUS; SUBCUTANEOUS at 10:33

## 2022-09-27 RX ADMIN — Medication 2: at 06:26

## 2022-09-27 RX ADMIN — Medication 25 MILLIGRAM(S): at 18:11

## 2022-09-27 RX ADMIN — Medication 12 UNIT(S): at 17:49

## 2022-09-27 RX ADMIN — Medication 81 MILLIGRAM(S): at 12:27

## 2022-09-27 RX ADMIN — ATORVASTATIN CALCIUM 10 MILLIGRAM(S): 80 TABLET, FILM COATED ORAL at 21:39

## 2022-09-27 RX ADMIN — INSULIN GLARGINE 16 UNIT(S): 100 INJECTION, SOLUTION SUBCUTANEOUS at 21:38

## 2022-09-27 RX ADMIN — GABAPENTIN 300 MILLIGRAM(S): 400 CAPSULE ORAL at 18:11

## 2022-09-27 RX ADMIN — Medication 8 UNIT(S): at 06:27

## 2022-09-27 RX ADMIN — ISOSORBIDE MONONITRATE 60 MILLIGRAM(S): 60 TABLET, EXTENDED RELEASE ORAL at 15:33

## 2022-09-27 RX ADMIN — Medication 4: at 12:30

## 2022-09-27 RX ADMIN — SODIUM CHLORIDE 3 MILLILITER(S): 9 INJECTION INTRAMUSCULAR; INTRAVENOUS; SUBCUTANEOUS at 05:26

## 2022-09-27 RX ADMIN — SODIUM CHLORIDE 3 MILLILITER(S): 9 INJECTION INTRAMUSCULAR; INTRAVENOUS; SUBCUTANEOUS at 21:53

## 2022-09-27 RX ADMIN — Medication 50 MICROGRAM(S): at 05:24

## 2022-09-27 NOTE — DIETITIAN INITIAL EVALUATION ADULT - OTHER CALCULATIONS
Based on Standards of Care pt >% IBW thus ideal body weight used for all calculations. Needs adjusted for pre op.

## 2022-09-27 NOTE — PROGRESS NOTE ADULT - ASSESSMENT
Mr. Bello is a 56 years old male with a past medical history of type 2 diabetes mellitus, hypertension, hyperlipidemia, peripheral arterial disease, chronic kidney disease, CVA (2013, 2014, 2015, with residual right sided weakness) who originally present to [facility] following an abnormal stress test. During workup patient had C showing pLAD 80% stenosis, Diagonal 1 70% stenosis, Diagonal 2 80% stenosis, Ramus 70% stenosis, OM1 90% stenosis, mRCA 85% stenosis, EF 60%. Patient now transferred to [facility] under the care of Dr. Herrera, in setting of multi vessel CAD, for further evaluation and management. Endocrinology following for recommendations regarding diabetes management.     A1C: 7.6%  Weight: 75kg  Cr/GRF: 1.4/57  EF 55-60%

## 2022-09-27 NOTE — PROGRESS NOTE ADULT - ASSESSMENT
Assessment:  55 y/o male with PMHx of HTN, HLD, DM, PAD, CKD, CVA x3 (2013, 2014, 2015 w/ residual right sided weakness), who originally present to [facility] following an abnormal stress test. During workup patient had Coronary Catheterization showing pLAD 80% stenosis, Diagonal 1 70% stenosis, Diagonal 2 80% stenosis, Ramus 70% stenosis, OM1 90% stenosis, mRCA 85% stenosis, EF 60%. On 9/22/22 he was transferred to [facility] under the care of Dr. Herrera, in setting of multi vessel CAD, for further evaluation and management. Neurology/Stroke Team was consulted secondary to CVA history and they obtained a baseline neuro exam. Endocrinology consulted and following as well. Incidental finding of hep C, pt states he was unaware. Plan for OR tomorrow 9/28/22 for CABG with Dr. Herrera.    Plan:    Neurovascular:   -no pain at this time  hx CVAx3 ([id number])  -stroke team following    PAD  -continue gabapentin    Cardiovascular:   CAD preop CABG  -continue ASA  -continue Imdur  -continue beta blocker  HLD  -continue atorvastatin  -Hemodynamically stable.   -Monitor: BP, HR, tele    Respiratory:   -Oxygenating well on room air  -Encourage continued use of IS 10x/hr and frequent ambulation    GI:  -GI PPX: continue protonix  -PO Diet  -Bowel Regimen: continue senna    Renal / :  CKD  -Continue to monitor renal function: BUN/Cr: 33/1.51  -Monitor I/O's daily     Endocrine:    -No hx of DM or thyroid dx  -A1c: 7.6  -TSH: 2.360    Hematologic:  -CBC: H/H- 12.2/36.8  -Coagulation Panel.    ID:  -Temperature: afebrile  -CBC: WBC- 7.12  -Continue to observe for SIRS/Sepsis Syndrome.    Prophylaxis:  -DVT prophylaxis with 5000 SubQ Heparin q8h.  -Continue with SCD's b/l while patient is at rest     Disposition:  -OR tomorrow   Assessment:  57 y/o male with PMHx of HTN, HLD, DM, PAD, CKD, CVA x3 (2013, 2014, 2015 w/ residual right sided weakness), who originally present to [facility] following an abnormal stress test. During workup patient had Coronary Catheterization showing pLAD 80% stenosis, Diagonal 1 70% stenosis, Diagonal 2 80% stenosis, Ramus 70% stenosis, OM1 90% stenosis, mRCA 85% stenosis, EF 60%. On 9/22/22 he was transferred to [facility] under the care of Dr. Herrera, in setting of multi vessel CAD, for further evaluation and management. Neurology/Stroke Team was consulted secondary to CVA history and they obtained a baseline neuro exam. Endocrinology consulted and following as well. Incidental finding of hep C, pt states he was unaware. Plan for OR tomorrow 9/28/22 for CABG with Dr. Herrera.    Plan:    Neurovascular:   -no pain at this time  hx CVAx3 ([id number])  -stroke team following    PAD  -continue gabapentin    Cardiovascular:   CAD preop CABG  -continue ASA  -continue Imdur  -continue beta blocker  HLD  -continue atorvastatin  -Hemodynamically stable.   -Monitor: BP, HR, tele    Respiratory:   -Oxygenating well on room air  -Encourage continued use of IS 10x/hr and frequent ambulation    GI:  -GI PPX: continue protonix  -PO Diet  -Bowel Regimen: continue senna    Renal / :  CKD  -Continue to monitor renal function: BUN/Cr: 33/1.51  -Monitor I/O's daily     Endocrine:    hx of DM  -A1c: 7.6  -endocrine following  -continue lantus 16u, lispro 8u, MISS  hx hypothyroidism  -continue synthroid 50  -TSH: 2.360    Hematologic:  -CBC: H/H- 12.2/36.8  -Coagulation Panel.    ID:  -Temperature: afebrile  -CBC: WBC- 7.12  -Continue to observe for SIRS/Sepsis Syndrome.    Prophylaxis:  -DVT prophylaxis with 5000 SubQ Heparin q8h.  -Continue with SCD's b/l while patient is at rest     Disposition:  -OR tomorrow

## 2022-09-27 NOTE — DIETITIAN INITIAL EVALUATION ADULT - PERTINENT MEDS FT
MEDICATIONS  (STANDING):  aspirin enteric coated 81 milliGRAM(s) Oral daily  atorvastatin 10 milliGRAM(s) Oral at bedtime  chlorhexidine 0.12% Liquid 15 milliLiter(s) Swish and Spit once  chlorhexidine 4% Liquid 1 Application(s) Topical once  chlorhexidine 4% Liquid 1 Application(s) Topical once  dextrose 5%. 1000 milliLiter(s) (100 mL/Hr) IV Continuous <Continuous>  dextrose 5%. 1000 milliLiter(s) (50 mL/Hr) IV Continuous <Continuous>  dextrose 50% Injectable 25 Gram(s) IV Push once  dextrose 50% Injectable 12.5 Gram(s) IV Push once  dextrose 50% Injectable 25 Gram(s) IV Push once  gabapentin 300 milliGRAM(s) Oral every 12 hours  glucagon  Injectable 1 milliGRAM(s) IntraMuscular once  heparin   Injectable 5000 Unit(s) SubCutaneous every 8 hours  insulin glargine Injectable (LANTUS) 16 Unit(s) SubCutaneous at bedtime  insulin lispro (ADMELOG) corrective regimen sliding scale   SubCutaneous Before meals and at bedtime  insulin lispro Injectable (ADMELOG) 12 Unit(s) SubCutaneous three times a day before meals  isosorbide   mononitrate ER Tablet (IMDUR) 60 milliGRAM(s) Oral daily  levothyroxine 50 MICROGram(s) Oral daily  metoprolol tartrate 25 milliGRAM(s) Oral every 12 hours  pantoprazole    Tablet 40 milliGRAM(s) Oral before breakfast  senna 2 Tablet(s) Oral at bedtime  sodium chloride 0.9% lock flush 3 milliLiter(s) IV Push every 8 hours    MEDICATIONS  (PRN):  dextrose Oral Gel 15 Gram(s) Oral once PRN Blood Glucose LESS THAN 70 milliGRAM(s)/deciliter

## 2022-09-27 NOTE — DIETITIAN INITIAL EVALUATION ADULT - PERTINENT LABORATORY DATA
09-27    136  |  105  |  33<H>  ----------------------------<  150<H>  4.3   |  22  |  1.51<H>    Ca    9.2      27 Sep 2022 05:26  Mg     2.2     09-27    POCT Blood Glucose.: 227 mg/dL (09-27-22 @ 12:08)  A1C with Estimated Average Glucose Result: 7.6 % (09-22-22 @ 21:26)

## 2022-09-27 NOTE — DIETITIAN INITIAL EVALUATION ADULT - NS FNS DIET ORDER
Diet, NPO after Midnight:      NPO Start Date: 27-Sep-2022,   NPO Start Time: 23:59  Except Medications     Special Instructions for Nursing:  Except Medications (09-27-22 @ 08:47)

## 2022-09-27 NOTE — PROGRESS NOTE ADULT - SUBJECTIVE AND OBJECTIVE BOX
Planned Date of Surgery:      9/28/22                                                                                                            Surgeon: Dr. Herrera    Procedure: CABG    HPI:  57 y/o male with PMHx of HTN, HLD, DM, PAD, CKD, CVA x3 (2013, 2014, 2015 w/ residual right sided weakness), who originally present to [facility] following an abnormal stress test. During workup patient had Coronary Catheterization showing pLAD 80% stenosis, Diagonal 1 70% stenosis, Diagonal 2 80% stenosis, Ramus 70% stenosis, OM1 90% stenosis, mRCA 85% stenosis, EF 60%. On 9/22/22 he was transferred to [facility] under the care of Dr. Herrera, in setting of multi vessel CAD, for further evaluation and management. Neurology/Stroke Team was consulted secondary to CVA history and they obtained a baseline neuro exam. Endocrinology consulted and following as well. Incidental finding of hep C, pt states he was unaware. Plan for OR 9/28/22 for CABG with Dr. Herrera. Patient feels well, has no complaints at this time. Denies lightheadedness, headache, CP, palpitations, SOB, abdominal pain, nausea, vomiting, fever, chills.    PAST MEDICAL & SURGICAL HISTORY:  HTN (hypertension)      HLD (hyperlipidemia)      DM (diabetes mellitus)      Cerebrovascular accident (CVA)      PAD (peripheral artery disease)      CKD (chronic kidney disease)      No significant past surgical history          No Known Drug Allergies  shellfish (Rash; Urticaria)      Physical Exam  T(C): 36.5 (09-27-22 @ 05:10), Max: 36.6 (09-26-22 @ 09:02)  HR: 70 (09-27-22 @ 06:00) (63 - 79)  BP: 117/72 (09-27-22 @ 06:00) (109/60 - 135/66)  RR: 18 (09-27-22 @ 06:00) (16 - 18)  SpO2: 97% (09-27-22 @ 06:00) (97% - 100%)    General: NAD, sitting comfortably in bed, conversing appropriately  Neurological: alert and oriented, UE and LE strength equal b/l, facial symmetry present  Cardiovascular: RRR, Clear S1 and S2, no murmurs appreciated  Respiratory: chest expansion symmetrical, CTA b/l, no wheezing noted  Gastrointestinal: +BS, soft, NT, ND  Extremities: moving spontaneously, no calf tenderness or edema.  Vascular: warm, well perfused. DP/PT pulses palpable b/l.  Incisions: none    MEDICATIONS  (STANDING):  aspirin enteric coated 81 milliGRAM(s) Oral daily  atorvastatin 10 milliGRAM(s) Oral at bedtime  dextrose 5%. 1000 milliLiter(s) (50 mL/Hr) IV Continuous <Continuous>  dextrose 5%. 1000 milliLiter(s) (100 mL/Hr) IV Continuous <Continuous>  dextrose 50% Injectable 25 Gram(s) IV Push once  dextrose 50% Injectable 12.5 Gram(s) IV Push once  dextrose 50% Injectable 25 Gram(s) IV Push once  gabapentin 300 milliGRAM(s) Oral every 12 hours  glucagon  Injectable 1 milliGRAM(s) IntraMuscular once  heparin   Injectable 5000 Unit(s) SubCutaneous every 8 hours  insulin glargine Injectable (LANTUS) 16 Unit(s) SubCutaneous at bedtime  insulin lispro (ADMELOG) corrective regimen sliding scale   SubCutaneous Before meals and at bedtime  insulin lispro Injectable (ADMELOG) 8 Unit(s) SubCutaneous three times a day before meals  isosorbide   mononitrate ER Tablet (IMDUR) 60 milliGRAM(s) Oral daily  levothyroxine 50 MICROGram(s) Oral daily  metoprolol tartrate 25 milliGRAM(s) Oral every 12 hours  pantoprazole    Tablet 40 milliGRAM(s) Oral before breakfast  senna 2 Tablet(s) Oral at bedtime  sodium chloride 0.9% lock flush 3 milliLiter(s) IV Push every 8 hours    MEDICATIONS  (PRN):  dextrose Oral Gel 15 Gram(s) Oral once PRN Blood Glucose LESS THAN 70 milliGRAM(s)/deciliter      On Beta Blocker? yes    Labs:                        12.2   7.12  )-----------( 216      ( 27 Sep 2022 05:26 )             36.8     09-27    136  |  105  |  33<H>  ----------------------------<  150<H>  4.3   |  22  |  1.51<H>    Ca    9.2      27 Sep 2022 05:26  Mg     2.2     09-27      PT/INR - ( 26 Sep 2022 06:35 )   PT: 12.0 sec;   INR: 1.01          PTT - ( 26 Sep 2022 06:35 )  PTT:44.2 sec    ABO Interpretation: A (09-22-22 @ 22:12)          Hgb A1C:   7.6%    EKG:  in chart    CXR:  < from: Xray Chest 2 Views PA/Lat (09.24.22 @ 17:19) >  IMPRESSION:    Lines/devices: None.    Lungs/pleura: Clear Lungs. No pneumothorax. No pleural effusions.    Cardiomediastinal silhouette: Within normal limits.    Other: Normal osseous structures.    < end of copied text >    CT Scans:  < from: CT Chest No Cont (09.23.22 @ 10:56) >  Impression: 1. Mild amount of calcified plaque thoracic aorta.    2. Mildly calcified aortic valve.    3. Mild emphysema.    < end of copied text >    Cath Report:  pLAD 80% stenosis, Diagonal 1 70% stenosis, Diagonal 2 80% stenosis, Ramus 70% stenosis, OM1 90% stenosis, mRCA 85% stenosis, EF 60%    Echo:  < from: TTE Echo Complete w/o Contrast w/ Doppler (09.23.22 @ 09:29) >  CONCLUSIONS:     1. Normal left ventricular size and systolic function.   2. Normal right ventricular size and systolic function.   3. Normal atria.   4. Aortic sclerosis without significant stenosis.   5. Pulmonary artery systolic pressure is 23 mmHg.   6. Trivial pericardial effusion.   7. No prior echo is available for comparison.    < end of copied text >    PFT's:  in chart    Carotid Duplex:  < from: US Duplex Carotid Arteries Complete, Bilateral (09.23.22 @ 11:32) >  IMPRESSION:  1. No significant hemodynamic stenosis of either carotid artery.    < end of copied text >    Consult in Chart?  YES   Consent in Chart? YES   Pre-op Orders Placed? YES   Blood Products Ordered? YES   NPO ordered? YES

## 2022-09-27 NOTE — DIETITIAN INITIAL EVALUATION ADULT - OTHER INFO
55 y/o male with PMHx of HTN, HLD, DM, PAD, CKD, CVA x3 (2013, 2014, 2015 w/ residual right sided weakness), who originally present to [facility] following an abnormal stress test. During workup patient had Coronary Catheterization showing pLAD 80% stenosis, Diagonal 1 70% stenosis, Diagonal 2 80% stenosis, Ramus 70% stenosis, OM1 90% stenosis, mRCA 85% stenosis, EF 60%. On 9/22/22 he was transferred to [facility] under the care of Dr. Herrera, in setting of multi vessel CAD, for further evaluation and management. Neurology/Stroke Team was consulted secondary to CVA history and they obtained a baseline neuro exam. Endocrinology consulted and following as well. Incidental finding of hep C, pt states he was unaware. Plan for OR tomorrow 9/28/22 for CABG with Dr. Herrera.    Pt seen at bedside for initial assessment- pt w/ limited engagement in RD interview thus limited information obtained. Last documented bowel movement 9/26. Per EMR, pt w/ shellfish allergy. Unable to obtain diet recall PTA. No height in EMR; pt reports usual height 5'5. Reports usual body weight 160-170 pounds (relatively consistent with dosing weight 164 pounds). No edema documented at this time. No pressure ulcers documented at this time. incision per chart. Abdulkadir score=22. Labs reviewed 9/27; noted w/ elevated BUN/Cr. Fingersticks 9/26-9/27: 153-269 mg/dL; insulin regimen ordered. Observed pt with no overt signs of muscle or fat wasting. Based on ASPEN guidelines, pt does not meet criteria for malnutrition at this time. Provided pt with diet education regarding importance of meeting nutritional needs post operatively ; amenable to education. Discussed current diet order DASH/TLC CTSCHO diet; however, pt would benefit from diet ed reinforcement. Pt reports feeling hungry during hospital stay, able to complete 100% of meals. No cultural, ethnic, Quaker food preferences noted. Made aware RD remains available. RD to follow up per protocol. See nutrition recommendations below.

## 2022-09-27 NOTE — PROGRESS NOTE ADULT - PROBLEM SELECTOR PLAN 1
- Please continue lantus to 16 units at bedtime.   - Increase lispro to 12 units before each meal.  - Continue lispro moderate dose sliding scale four times daily with meals and at bedtime.  - Patient's fingerstick glucose goal is 100-180 mg/dL.      Thank you for allowing us to participate in the care of Mr. Bello.  Will continue to monitor.       Case discussed with Dr. Chamorro. Primary team updated.

## 2022-09-27 NOTE — PROGRESS NOTE ADULT - SUBJECTIVE AND OBJECTIVE BOX
INTERVAL HPI/OVERNIGHT EVENTS:    Patient seen and examined at the bedside. No new complaints CABG tomorrow. Eating well.    FSG & insulin:  Yesterday:  Dinner FSG  259  Lispro  8+6  Ate salmon burger, green beans.  Bedtime    Lantus 16   lispro   4  Today:  Breakfast    Lispro  8+2 Ate cornflakes and milk, michael toast, yogurt, eggs, 1 apple.     Lunch FSG    227 Lispro 8+4    Pt reports the following symptoms:    CONSTITUTIONAL:  Negative fever or chills, feels well, good appetite  EYES:  Negative  blurry vision or double vision  CARDIOVASCULAR:  Negative for chest pain or palpitations  RESPIRATORY:  Negative for cough, wheezing, or SOB   GASTROINTESTINAL:  Negative for nausea, vomiting, diarrhea, constipation, or abdominal pain  GENITOURINARY:  Negative frequency, urgency or dysuria  NEUROLOGIC:  No headache, confusion, dizziness, lightheadedness    MEDICATIONS  (STANDING):  aspirin enteric coated 81 milliGRAM(s) Oral daily  atorvastatin 10 milliGRAM(s) Oral at bedtime  chlorhexidine 0.12% Liquid 15 milliLiter(s) Swish and Spit once  chlorhexidine 4% Liquid 1 Application(s) Topical once  chlorhexidine 4% Liquid 1 Application(s) Topical once  dextrose 5%. 1000 milliLiter(s) (100 mL/Hr) IV Continuous <Continuous>  dextrose 5%. 1000 milliLiter(s) (50 mL/Hr) IV Continuous <Continuous>  dextrose 50% Injectable 25 Gram(s) IV Push once  dextrose 50% Injectable 12.5 Gram(s) IV Push once  dextrose 50% Injectable 25 Gram(s) IV Push once  gabapentin 300 milliGRAM(s) Oral every 12 hours  glucagon  Injectable 1 milliGRAM(s) IntraMuscular once  heparin   Injectable 5000 Unit(s) SubCutaneous every 8 hours  insulin glargine Injectable (LANTUS) 16 Unit(s) SubCutaneous at bedtime  insulin lispro (ADMELOG) corrective regimen sliding scale   SubCutaneous Before meals and at bedtime  insulin lispro Injectable (ADMELOG) 12 Unit(s) SubCutaneous three times a day before meals  isosorbide   mononitrate ER Tablet (IMDUR) 60 milliGRAM(s) Oral daily  levothyroxine 50 MICROGram(s) Oral daily  metoprolol tartrate 25 milliGRAM(s) Oral every 12 hours  pantoprazole    Tablet 40 milliGRAM(s) Oral before breakfast  senna 2 Tablet(s) Oral at bedtime  sodium chloride 0.9% lock flush 3 milliLiter(s) IV Push every 8 hours    MEDICATIONS  (PRN):  dextrose Oral Gel 15 Gram(s) Oral once PRN Blood Glucose LESS THAN 70 milliGRAM(s)/deciliter      PHYSICAL EXAM  Vital Signs Last 24 Hrs  T(C): 36.6 (27 Sep 2022 13:55), Max: 36.6 (26 Sep 2022 17:40)  T(F): 97.9 (27 Sep 2022 13:55), Max: 97.9 (27 Sep 2022 13:55)  HR: 62 (27 Sep 2022 12:25) (60 - 77)  BP: 114/58 (27 Sep 2022 12:25) (109/60 - 135/66)  BP(mean): 81 (27 Sep 2022 12:25) (78 - 92)  RR: 15 (27 Sep 2022 12:25) (15 - 18)  SpO2: 99% (27 Sep 2022 12:25) (97% - 99%)    Parameters below as of 27 Sep 2022 12:25  Patient On (Oxygen Delivery Method): room air    Constitutional:NAD.   HEENT: NCAT, MMM, OP clear, EOMI, no proptosis or lid retraction  Neck: no thyromegaly or palpable thyroid nodules   Respiratory: lungs CTAB.  Cardiovascular: regular rhythm, normal S1 and S2, no audible murmurs, no peripheral edema  GI: soft, NT/ND, no masses/HSM appreciated.  Neurology: no tremors, DTR 2+  Skin: no visible rashes/lesions.   Psychiatric: AAO x 3, normal affect/mood.    LABS:                        12.2   7.12  )-----------( 216      ( 27 Sep 2022 05:26 )             36.8     09-27    136  |  105  |  33<H>  ----------------------------<  150<H>  4.3   |  22  |  1.51<H>    Ca    9.2      27 Sep 2022 05:26  Mg     2.2     09-27      PT/INR - ( 26 Sep 2022 06:35 )   PT: 12.0 sec;   INR: 1.01          PTT - ( 26 Sep 2022 06:35 )  PTT:44.2 sec    Thyroid Stimulating Hormone, Serum: 2.360 uIU/mL (09-22 @ 21:26)      HbA1C:   CAPILLARY BLOOD GLUCOSE      POCT Blood Glucose.: 227 mg/dL (27 Sep 2022 12:08)  POCT Blood Glucose.: 153 mg/dL (27 Sep 2022 05:45)  POCT Blood Glucose.: 207 mg/dL (26 Sep 2022 21:24)  POCT Blood Glucose.: 259 mg/dL (26 Sep 2022 17:25)

## 2022-09-28 ENCOUNTER — TRANSCRIPTION ENCOUNTER (OUTPATIENT)
Age: 57
End: 2022-09-28

## 2022-09-28 ENCOUNTER — APPOINTMENT (OUTPATIENT)
Dept: CARDIOTHORACIC SURGERY | Facility: HOSPITAL | Age: 57
End: 2022-09-28

## 2022-09-28 LAB
ALBUMIN SERPL ELPH-MCNC: 2.7 G/DL — LOW (ref 3.3–5)
ALBUMIN SERPL ELPH-MCNC: 3.4 G/DL — SIGNIFICANT CHANGE UP (ref 3.3–5)
ALP SERPL-CCNC: 42 U/L — SIGNIFICANT CHANGE UP (ref 40–120)
ALP SERPL-CCNC: 44 U/L — SIGNIFICANT CHANGE UP (ref 40–120)
ALT FLD-CCNC: 39 U/L — SIGNIFICANT CHANGE UP (ref 10–45)
ALT FLD-CCNC: 39 U/L — SIGNIFICANT CHANGE UP (ref 10–45)
ANION GAP SERPL CALC-SCNC: 12 MMOL/L — SIGNIFICANT CHANGE UP (ref 5–17)
ANION GAP SERPL CALC-SCNC: 12 MMOL/L — SIGNIFICANT CHANGE UP (ref 5–17)
ANION GAP SERPL CALC-SCNC: 9 MMOL/L — SIGNIFICANT CHANGE UP (ref 5–17)
APTT BLD: 32.2 SEC — SIGNIFICANT CHANGE UP (ref 27.5–35.5)
APTT BLD: 32.9 SEC — SIGNIFICANT CHANGE UP (ref 27.5–35.5)
APTT BLD: 52 SEC — HIGH (ref 27.5–35.5)
AST SERPL-CCNC: 87 U/L — HIGH (ref 10–40)
AST SERPL-CCNC: 92 U/L — HIGH (ref 10–40)
BASOPHILS # BLD AUTO: 0.04 K/UL — SIGNIFICANT CHANGE UP (ref 0–0.2)
BASOPHILS NFR BLD AUTO: 0.3 % — SIGNIFICANT CHANGE UP (ref 0–2)
BILIRUB SERPL-MCNC: 0.6 MG/DL — SIGNIFICANT CHANGE UP (ref 0.2–1.2)
BILIRUB SERPL-MCNC: 1 MG/DL — SIGNIFICANT CHANGE UP (ref 0.2–1.2)
BUN SERPL-MCNC: 17 MG/DL — SIGNIFICANT CHANGE UP (ref 7–23)
BUN SERPL-MCNC: 17 MG/DL — SIGNIFICANT CHANGE UP (ref 7–23)
BUN SERPL-MCNC: 30 MG/DL — HIGH (ref 7–23)
CALCIUM SERPL-MCNC: 7.6 MG/DL — LOW (ref 8.4–10.5)
CALCIUM SERPL-MCNC: 7.8 MG/DL — LOW (ref 8.4–10.5)
CALCIUM SERPL-MCNC: 9.3 MG/DL — SIGNIFICANT CHANGE UP (ref 8.4–10.5)
CHLORIDE SERPL-SCNC: 105 MMOL/L — SIGNIFICANT CHANGE UP (ref 96–108)
CHLORIDE SERPL-SCNC: 105 MMOL/L — SIGNIFICANT CHANGE UP (ref 96–108)
CHLORIDE SERPL-SCNC: 108 MMOL/L — SIGNIFICANT CHANGE UP (ref 96–108)
CO2 SERPL-SCNC: 19 MMOL/L — LOW (ref 22–31)
CO2 SERPL-SCNC: 21 MMOL/L — LOW (ref 22–31)
CO2 SERPL-SCNC: 22 MMOL/L — SIGNIFICANT CHANGE UP (ref 22–31)
CREAT SERPL-MCNC: 0.95 MG/DL — SIGNIFICANT CHANGE UP (ref 0.5–1.3)
CREAT SERPL-MCNC: 1.03 MG/DL — SIGNIFICANT CHANGE UP (ref 0.5–1.3)
CREAT SERPL-MCNC: 1.29 MG/DL — SIGNIFICANT CHANGE UP (ref 0.5–1.3)
EGFR: 65 ML/MIN/1.73M2 — SIGNIFICANT CHANGE UP
EGFR: 85 ML/MIN/1.73M2 — SIGNIFICANT CHANGE UP
EGFR: 94 ML/MIN/1.73M2 — SIGNIFICANT CHANGE UP
EOSINOPHIL # BLD AUTO: 0.04 K/UL — SIGNIFICANT CHANGE UP (ref 0–0.5)
EOSINOPHIL NFR BLD AUTO: 0.3 % — SIGNIFICANT CHANGE UP (ref 0–6)
GAS PNL BLDA: SIGNIFICANT CHANGE UP
GLUCOSE BLDC GLUCOMTR-MCNC: 130 MG/DL — HIGH (ref 70–99)
GLUCOSE BLDC GLUCOMTR-MCNC: 147 MG/DL — HIGH (ref 70–99)
GLUCOSE BLDC GLUCOMTR-MCNC: 151 MG/DL — HIGH (ref 70–99)
GLUCOSE BLDC GLUCOMTR-MCNC: 152 MG/DL — HIGH (ref 70–99)
GLUCOSE BLDC GLUCOMTR-MCNC: 156 MG/DL — HIGH (ref 70–99)
GLUCOSE BLDC GLUCOMTR-MCNC: 157 MG/DL — HIGH (ref 70–99)
GLUCOSE BLDC GLUCOMTR-MCNC: 169 MG/DL — HIGH (ref 70–99)
GLUCOSE BLDC GLUCOMTR-MCNC: 198 MG/DL — HIGH (ref 70–99)
GLUCOSE BLDC GLUCOMTR-MCNC: 211 MG/DL — HIGH (ref 70–99)
GLUCOSE SERPL-MCNC: 174 MG/DL — HIGH (ref 70–99)
GLUCOSE SERPL-MCNC: 204 MG/DL — HIGH (ref 70–99)
GLUCOSE SERPL-MCNC: 210 MG/DL — HIGH (ref 70–99)
HCT VFR BLD CALC: 34.2 % — LOW (ref 39–50)
HCT VFR BLD CALC: 35 % — LOW (ref 39–50)
HCT VFR BLD CALC: 37.5 % — LOW (ref 39–50)
HGB BLD-MCNC: 11.8 G/DL — LOW (ref 13–17)
HGB BLD-MCNC: 12 G/DL — LOW (ref 13–17)
HGB BLD-MCNC: 12.4 G/DL — LOW (ref 13–17)
IMM GRANULOCYTES NFR BLD AUTO: 1.1 % — HIGH (ref 0–0.9)
INR BLD: 0.96 — SIGNIFICANT CHANGE UP (ref 0.88–1.16)
INR BLD: 1.07 — SIGNIFICANT CHANGE UP (ref 0.88–1.16)
INR BLD: 1.09 — SIGNIFICANT CHANGE UP (ref 0.88–1.16)
ISTAT ACTK (ACTIVATED CLOTTING TIME KAOLIN): 132 SEC — SIGNIFICANT CHANGE UP (ref 74–137)
ISTAT ACTK (ACTIVATED CLOTTING TIME KAOLIN): 167 SEC — HIGH (ref 74–137)
ISTAT ACTK (ACTIVATED CLOTTING TIME KAOLIN): 190 SEC — HIGH (ref 74–137)
ISTAT ACTK (ACTIVATED CLOTTING TIME KAOLIN): 231 SEC — HIGH (ref 74–137)
ISTAT ACTK (ACTIVATED CLOTTING TIME KAOLIN): 248 SEC — HIGH (ref 74–137)
ISTAT ARTERIAL BE: 0 MMOL/L — SIGNIFICANT CHANGE UP (ref -2–3)
ISTAT ARTERIAL GLUCOSE: 165 MG/DL — HIGH (ref 70–99)
ISTAT ARTERIAL HCO3: 25 MMOL/L — SIGNIFICANT CHANGE UP (ref 22–26)
ISTAT ARTERIAL HEMATOCRIT: 33 % — LOW (ref 39–50)
ISTAT ARTERIAL HEMOGLOBIN: 11.2 G/DL — LOW (ref 13–17)
ISTAT ARTERIAL IONIZED CALCIUM: 1.06 MMOL/L — LOW (ref 1.12–1.3)
ISTAT ARTERIAL PCO2: 39 MMHG — SIGNIFICANT CHANGE UP (ref 35–45)
ISTAT ARTERIAL PH: 7.41 — SIGNIFICANT CHANGE UP (ref 7.35–7.45)
ISTAT ARTERIAL PO2: 119 MMHG — HIGH (ref 80–105)
ISTAT ARTERIAL POTASSIUM: 4.8 MMOL/L — SIGNIFICANT CHANGE UP (ref 3.5–5.3)
ISTAT ARTERIAL SO2: 99 % — HIGH (ref 95–98)
ISTAT ARTERIAL SODIUM: 140 MMOL/L — SIGNIFICANT CHANGE UP (ref 135–145)
ISTAT ARTERIAL TCO2: 26 MMOL/L — SIGNIFICANT CHANGE UP (ref 22–31)
LYMPHOCYTES # BLD AUTO: 0.86 K/UL — LOW (ref 1–3.3)
LYMPHOCYTES # BLD AUTO: 7 % — LOW (ref 13–44)
MAGNESIUM SERPL-MCNC: 2.1 MG/DL — SIGNIFICANT CHANGE UP (ref 1.6–2.6)
MAGNESIUM SERPL-MCNC: 2.2 MG/DL — SIGNIFICANT CHANGE UP (ref 1.6–2.6)
MAGNESIUM SERPL-MCNC: 2.5 MG/DL — SIGNIFICANT CHANGE UP (ref 1.6–2.6)
MCHC RBC-ENTMCNC: 29.1 PG — SIGNIFICANT CHANGE UP (ref 27–34)
MCHC RBC-ENTMCNC: 29.5 PG — SIGNIFICANT CHANGE UP (ref 27–34)
MCHC RBC-ENTMCNC: 29.8 PG — SIGNIFICANT CHANGE UP (ref 27–34)
MCHC RBC-ENTMCNC: 33.1 GM/DL — SIGNIFICANT CHANGE UP (ref 32–36)
MCHC RBC-ENTMCNC: 34.3 GM/DL — SIGNIFICANT CHANGE UP (ref 32–36)
MCHC RBC-ENTMCNC: 34.5 GM/DL — SIGNIFICANT CHANGE UP (ref 32–36)
MCV RBC AUTO: 85.5 FL — SIGNIFICANT CHANGE UP (ref 80–100)
MCV RBC AUTO: 86.8 FL — SIGNIFICANT CHANGE UP (ref 80–100)
MCV RBC AUTO: 88 FL — SIGNIFICANT CHANGE UP (ref 80–100)
MONOCYTES # BLD AUTO: 0.92 K/UL — HIGH (ref 0–0.9)
MONOCYTES NFR BLD AUTO: 7.5 % — SIGNIFICANT CHANGE UP (ref 2–14)
NEUTROPHILS # BLD AUTO: 10.35 K/UL — HIGH (ref 1.8–7.4)
NEUTROPHILS NFR BLD AUTO: 83.8 % — HIGH (ref 43–77)
NRBC # BLD: 0 /100 WBCS — SIGNIFICANT CHANGE UP (ref 0–0)
PHOSPHATE SERPL-MCNC: 1.7 MG/DL — LOW (ref 2.5–4.5)
PHOSPHATE SERPL-MCNC: 2.4 MG/DL — LOW (ref 2.5–4.5)
PLATELET # BLD AUTO: 133 K/UL — LOW (ref 150–400)
PLATELET # BLD AUTO: 135 K/UL — LOW (ref 150–400)
PLATELET # BLD AUTO: 244 K/UL — SIGNIFICANT CHANGE UP (ref 150–400)
POTASSIUM SERPL-MCNC: 4.6 MMOL/L — SIGNIFICANT CHANGE UP (ref 3.5–5.3)
POTASSIUM SERPL-MCNC: 4.7 MMOL/L — SIGNIFICANT CHANGE UP (ref 3.5–5.3)
POTASSIUM SERPL-MCNC: 5.2 MMOL/L — SIGNIFICANT CHANGE UP (ref 3.5–5.3)
POTASSIUM SERPL-SCNC: 4.6 MMOL/L — SIGNIFICANT CHANGE UP (ref 3.5–5.3)
POTASSIUM SERPL-SCNC: 4.7 MMOL/L — SIGNIFICANT CHANGE UP (ref 3.5–5.3)
POTASSIUM SERPL-SCNC: 5.2 MMOL/L — SIGNIFICANT CHANGE UP (ref 3.5–5.3)
PROT SERPL-MCNC: 4.6 G/DL — LOW (ref 6–8.3)
PROT SERPL-MCNC: 5.3 G/DL — LOW (ref 6–8.3)
PROTHROM AB SERPL-ACNC: 11.4 SEC — SIGNIFICANT CHANGE UP (ref 10.5–13.4)
PROTHROM AB SERPL-ACNC: 12.8 SEC — SIGNIFICANT CHANGE UP (ref 10.5–13.4)
PROTHROM AB SERPL-ACNC: 13 SEC — SIGNIFICANT CHANGE UP (ref 10.5–13.4)
RBC # BLD: 4 M/UL — LOW (ref 4.2–5.8)
RBC # BLD: 4.03 M/UL — LOW (ref 4.2–5.8)
RBC # BLD: 4.26 M/UL — SIGNIFICANT CHANGE UP (ref 4.2–5.8)
RBC # FLD: 14.7 % — HIGH (ref 10.3–14.5)
RBC # FLD: 14.8 % — HIGH (ref 10.3–14.5)
RBC # FLD: 15.5 % — HIGH (ref 10.3–14.5)
SODIUM SERPL-SCNC: 136 MMOL/L — SIGNIFICANT CHANGE UP (ref 135–145)
SODIUM SERPL-SCNC: 136 MMOL/L — SIGNIFICANT CHANGE UP (ref 135–145)
SODIUM SERPL-SCNC: 141 MMOL/L — SIGNIFICANT CHANGE UP (ref 135–145)
WBC # BLD: 11.42 K/UL — HIGH (ref 3.8–10.5)
WBC # BLD: 12.34 K/UL — HIGH (ref 3.8–10.5)
WBC # BLD: 7.96 K/UL — SIGNIFICANT CHANGE UP (ref 3.8–10.5)
WBC # FLD AUTO: 11.42 K/UL — HIGH (ref 3.8–10.5)
WBC # FLD AUTO: 12.34 K/UL — HIGH (ref 3.8–10.5)
WBC # FLD AUTO: 7.96 K/UL — SIGNIFICANT CHANGE UP (ref 3.8–10.5)

## 2022-09-28 PROCEDURE — 99291 CRITICAL CARE FIRST HOUR: CPT

## 2022-09-28 PROCEDURE — 99292 CRITICAL CARE ADDL 30 MIN: CPT

## 2022-09-28 PROCEDURE — 93010 ELECTROCARDIOGRAM REPORT: CPT

## 2022-09-28 PROCEDURE — 33517 CABG ARTERY-VEIN SINGLE: CPT | Mod: AS

## 2022-09-28 PROCEDURE — 33534 CABG ARTERIAL TWO: CPT | Mod: AS

## 2022-09-28 PROCEDURE — 33534 CABG ARTERIAL TWO: CPT

## 2022-09-28 PROCEDURE — 71045 X-RAY EXAM CHEST 1 VIEW: CPT | Mod: 26

## 2022-09-28 PROCEDURE — 33517 CABG ARTERY-VEIN SINGLE: CPT

## 2022-09-28 DEVICE — KIT CVC 3LUM SPECTRUM 9FR: Type: IMPLANTABLE DEVICE | Status: FUNCTIONAL

## 2022-09-28 DEVICE — LIGATING CLIPS WECK HORIZON MEDIUM (BLUE) 24: Type: IMPLANTABLE DEVICE | Status: FUNCTIONAL

## 2022-09-28 DEVICE — CANNULA VENOUS 3 STAGE STANDARD 29/37/37FR X 1/2": Type: IMPLANTABLE DEVICE | Status: FUNCTIONAL

## 2022-09-28 DEVICE — CANNULA RCSP 15FR X 12.5" AUTO-INFLATE CUFF WITH GUIDEWIRE STYLET: Type: IMPLANTABLE DEVICE | Status: FUNCTIONAL

## 2022-09-28 DEVICE — CHEST DRAIN THORACIC PVC 32FR: Type: IMPLANTABLE DEVICE | Status: FUNCTIONAL

## 2022-09-28 DEVICE — CATH SILICONE THORACIC STR 24FR 20/BX: Type: IMPLANTABLE DEVICE | Status: FUNCTIONAL

## 2022-09-28 DEVICE — LIGATING CLIPS WECK HORIZON SMALL-WIDE (RED) 24: Type: IMPLANTABLE DEVICE | Status: FUNCTIONAL

## 2022-09-28 DEVICE — PACING WIRE BLUE LIGHT/DARK 2-0 24" SH SKS-3: Type: IMPLANTABLE DEVICE | Status: FUNCTIONAL

## 2022-09-28 DEVICE — PACING WIRE STREAMLINE BIPOLAR MYOCARDIAL: Type: IMPLANTABLE DEVICE | Status: FUNCTIONAL

## 2022-09-28 DEVICE — CATH VENT LEFT HEART SILICONE 16FR NON-VENTED: Type: IMPLANTABLE DEVICE | Status: FUNCTIONAL

## 2022-09-28 DEVICE — CLIP APPLIER ETHICON LIGACLIP 9 3/8" SMALL: Type: IMPLANTABLE DEVICE | Status: FUNCTIONAL

## 2022-09-28 DEVICE — CATH VENT LEFT HEART SILICONE 20FR NON-VENTED: Type: IMPLANTABLE DEVICE | Status: FUNCTIONAL

## 2022-09-28 RX ORDER — ASPIRIN/CALCIUM CARB/MAGNESIUM 324 MG
81 TABLET ORAL DAILY
Refills: 0 | Status: DISCONTINUED | OUTPATIENT
Start: 2022-09-28 | End: 2022-10-02

## 2022-09-28 RX ORDER — DEXTROSE 50 % IN WATER 50 %
25 SYRINGE (ML) INTRAVENOUS
Refills: 0 | Status: DISCONTINUED | OUTPATIENT
Start: 2022-09-28 | End: 2022-09-29

## 2022-09-28 RX ORDER — POLYETHYLENE GLYCOL 3350 17 G/17G
17 POWDER, FOR SOLUTION ORAL DAILY
Refills: 0 | Status: DISCONTINUED | OUTPATIENT
Start: 2022-09-28 | End: 2022-10-02

## 2022-09-28 RX ORDER — CHLORHEXIDINE GLUCONATE 213 G/1000ML
15 SOLUTION TOPICAL EVERY 12 HOURS
Refills: 0 | Status: DISCONTINUED | OUTPATIENT
Start: 2022-09-28 | End: 2022-09-29

## 2022-09-28 RX ORDER — HEPARIN SODIUM 5000 [USP'U]/ML
5000 INJECTION INTRAVENOUS; SUBCUTANEOUS EVERY 8 HOURS
Refills: 0 | Status: DISCONTINUED | OUTPATIENT
Start: 2022-09-28 | End: 2022-10-02

## 2022-09-28 RX ORDER — SODIUM CHLORIDE 9 MG/ML
1000 INJECTION INTRAMUSCULAR; INTRAVENOUS; SUBCUTANEOUS
Refills: 0 | Status: DISCONTINUED | OUTPATIENT
Start: 2022-09-28 | End: 2022-10-02

## 2022-09-28 RX ORDER — KETOROLAC TROMETHAMINE 30 MG/ML
30 SYRINGE (ML) INJECTION EVERY 6 HOURS
Refills: 0 | Status: DISCONTINUED | OUTPATIENT
Start: 2022-09-28 | End: 2022-09-29

## 2022-09-28 RX ORDER — PANTOPRAZOLE SODIUM 20 MG/1
40 TABLET, DELAYED RELEASE ORAL ONCE
Refills: 0 | Status: DISCONTINUED | OUTPATIENT
Start: 2022-09-28 | End: 2022-09-29

## 2022-09-28 RX ORDER — POTASSIUM PHOSPHATE, MONOBASIC POTASSIUM PHOSPHATE, DIBASIC 236; 224 MG/ML; MG/ML
15 INJECTION, SOLUTION INTRAVENOUS ONCE
Refills: 0 | Status: COMPLETED | OUTPATIENT
Start: 2022-09-28 | End: 2022-09-29

## 2022-09-28 RX ORDER — FENTANYL CITRATE 50 UG/ML
25 INJECTION INTRAVENOUS ONCE
Refills: 0 | Status: DISCONTINUED | OUTPATIENT
Start: 2022-09-28 | End: 2022-09-28

## 2022-09-28 RX ORDER — DEXTROSE 50 % IN WATER 50 %
50 SYRINGE (ML) INTRAVENOUS
Refills: 0 | Status: DISCONTINUED | OUTPATIENT
Start: 2022-09-28 | End: 2022-09-29

## 2022-09-28 RX ORDER — CEFAZOLIN SODIUM 1 G
2000 VIAL (EA) INJECTION EVERY 8 HOURS
Refills: 0 | Status: COMPLETED | OUTPATIENT
Start: 2022-09-28 | End: 2022-09-30

## 2022-09-28 RX ORDER — CHLORHEXIDINE GLUCONATE 213 G/1000ML
1 SOLUTION TOPICAL DAILY
Refills: 0 | Status: DISCONTINUED | OUTPATIENT
Start: 2022-09-28 | End: 2022-10-02

## 2022-09-28 RX ORDER — SODIUM CHLORIDE 9 MG/ML
500 INJECTION, SOLUTION INTRAVENOUS ONCE
Refills: 0 | Status: COMPLETED | OUTPATIENT
Start: 2022-09-28 | End: 2022-09-28

## 2022-09-28 RX ORDER — INSULIN HUMAN 100 [IU]/ML
1 INJECTION, SOLUTION SUBCUTANEOUS
Qty: 100 | Refills: 0 | Status: DISCONTINUED | OUTPATIENT
Start: 2022-09-28 | End: 2022-09-29

## 2022-09-28 RX ORDER — ACETAMINOPHEN 500 MG
1000 TABLET ORAL ONCE
Refills: 0 | Status: COMPLETED | OUTPATIENT
Start: 2022-09-28 | End: 2022-09-28

## 2022-09-28 RX ORDER — NICARDIPINE HYDROCHLORIDE 30 MG/1
5 CAPSULE, EXTENDED RELEASE ORAL
Qty: 40 | Refills: 0 | Status: DISCONTINUED | OUTPATIENT
Start: 2022-09-28 | End: 2022-09-30

## 2022-09-28 RX ORDER — PROPOFOL 10 MG/ML
10 INJECTION, EMULSION INTRAVENOUS
Qty: 1000 | Refills: 0 | Status: DISCONTINUED | OUTPATIENT
Start: 2022-09-28 | End: 2022-09-29

## 2022-09-28 RX ORDER — ALBUMIN HUMAN 25 %
250 VIAL (ML) INTRAVENOUS
Refills: 0 | Status: COMPLETED | OUTPATIENT
Start: 2022-09-28 | End: 2022-09-28

## 2022-09-28 RX ORDER — ALBUMIN HUMAN 25 %
250 VIAL (ML) INTRAVENOUS ONCE
Refills: 0 | Status: COMPLETED | OUTPATIENT
Start: 2022-09-28 | End: 2022-09-28

## 2022-09-28 RX ADMIN — CHLORHEXIDINE GLUCONATE 15 MILLILITER(S): 213 SOLUTION TOPICAL at 05:56

## 2022-09-28 RX ADMIN — FENTANYL CITRATE 25 MICROGRAM(S): 50 INJECTION INTRAVENOUS at 19:30

## 2022-09-28 RX ADMIN — FENTANYL CITRATE 25 MICROGRAM(S): 50 INJECTION INTRAVENOUS at 19:15

## 2022-09-28 RX ADMIN — CHLORHEXIDINE GLUCONATE 15 MILLILITER(S): 213 SOLUTION TOPICAL at 19:14

## 2022-09-28 RX ADMIN — Medication 50 MICROGRAM(S): at 05:26

## 2022-09-28 RX ADMIN — SODIUM CHLORIDE 3 MILLILITER(S): 9 INJECTION INTRAMUSCULAR; INTRAVENOUS; SUBCUTANEOUS at 05:26

## 2022-09-28 RX ADMIN — HEPARIN SODIUM 5000 UNIT(S): 5000 INJECTION INTRAVENOUS; SUBCUTANEOUS at 21:25

## 2022-09-28 RX ADMIN — Medication 25 MILLIGRAM(S): at 05:26

## 2022-09-28 RX ADMIN — Medication 4: at 06:11

## 2022-09-28 RX ADMIN — SODIUM CHLORIDE 500 MILLILITER(S): 9 INJECTION, SOLUTION INTRAVENOUS at 16:41

## 2022-09-28 RX ADMIN — NICARDIPINE HYDROCHLORIDE 25 MG/HR: 30 CAPSULE, EXTENDED RELEASE ORAL at 16:42

## 2022-09-28 RX ADMIN — CHLORHEXIDINE GLUCONATE 1 APPLICATION(S): 213 SOLUTION TOPICAL at 05:26

## 2022-09-28 RX ADMIN — Medication 400 MILLIGRAM(S): at 22:53

## 2022-09-28 RX ADMIN — INSULIN HUMAN 1 UNIT(S)/HR: 100 INJECTION, SOLUTION SUBCUTANEOUS at 16:54

## 2022-09-28 RX ADMIN — Medication 500 MILLILITER(S): at 18:39

## 2022-09-28 RX ADMIN — CHLORHEXIDINE GLUCONATE 1 APPLICATION(S): 213 SOLUTION TOPICAL at 05:27

## 2022-09-28 RX ADMIN — GABAPENTIN 300 MILLIGRAM(S): 400 CAPSULE ORAL at 05:26

## 2022-09-28 RX ADMIN — Medication 1000 MILLIGRAM(S): at 23:23

## 2022-09-28 RX ADMIN — Medication 100 MILLIGRAM(S): at 21:25

## 2022-09-28 RX ADMIN — Medication 500 MILLILITER(S): at 16:52

## 2022-09-28 NOTE — BRIEF OPERATIVE NOTE - OPERATION/FINDINGS
EVH harvest time: prep time  Radial harvest time: prep time EVH harvest time: 30 min prep time: 10 min  Radial harvest time: 30 min prep time 10 min

## 2022-09-28 NOTE — PRE-OP CHECKLIST - SELECT TESTS ORDERED
BMP/CBC/PT/PTT/INR/Type and Screen/EKG/CXR/COVID-19 /BMP/CBC/PT/PTT/INR/Type and Screen/EKG/CXR/POCT Blood Glucose/COVID-19

## 2022-09-28 NOTE — BRIEF OPERATIVE NOTE - NSICDXBRIEFPROCEDURE_GEN_ALL_CORE_FT
PROCEDURES:  CABG, with ADELINA 28-Sep-2022 14:46:08 LIMA-LAD, SVG-RCA, SVG-Ramus EF 45% Eboni Alexander

## 2022-09-28 NOTE — PROGRESS NOTE ADULT - SUBJECTIVE AND OBJECTIVE BOX
CTICU  CRITICAL  CARE  attending     Hand off received 					   Pertinent clinical, laboratory, radiographic, hemodynamic, echocardiographic, respiratory data, microbiologic data and chart were reviewed and analyzed frequently throughout the course of the day  Patient seen and examined with CTS/ SH attending at bedside  Pt is a 56yr old male with PMH HTN, HLD, DM, PAD, CKD, CVA x 3 with residual R sided weakness, Tx from [place] for abnl stress test and found to have multi vessel CAD.    Sp CABG x 3 (LIMA-LAD, SVG-RCA, SVG-Ramus, EF 45%, Javier, 9/28)      FAMILY HISTORY:  FH: stroke (Father)  Family history of diabetes mellitus (DM) (Mother)  PAST MEDICAL & SURGICAL HISTORY:  HTN (hypertension)  HLD (hyperlipidemia)  D (diabetes mellitus)  Cerebrovascular accident (CVA)  PAD (peripheral artery disease)  CKD (chronic kidney disease)  No significant past surgical history        Patient is a 56y old  Male who presents with a chief complaint of CAD.      14 system review was unremarkable    Vital signs, hemodynamic and respiratory parameters were reviewed from the bedside nursing flowsheet.  ICU Vital Signs Last 24 Hrs  T(C): 36.6 (28 Sep 2022 17:12), Max: 36.7 (28 Sep 2022 00:13)  T(F): 97.9 (28 Sep 2022 17:12), Max: 98 (28 Sep 2022 00:13)  HR: 89 (28 Sep 2022 20:00) (65 - 95)  BP: 129/63 (28 Sep 2022 09:01) (121/64 - 133/63)  BP(mean): 90 (28 Sep 2022 04:43) (88 - 91)  ABP: 122/58 (28 Sep 2022 20:00) (106/62 - 140/63)  ABP(mean): 78 (28 Sep 2022 20:00) (72 - 88)  RR: 20 (28 Sep 2022 20:00) (12 - 25)  SpO2: 100% (28 Sep 2022 20:00) (94% - 100%)    O2 Parameters below as of 28 Sep 2022 20:00  Patient On (Oxygen Delivery Method): ventilator    O2 Concentration (%): 40      Adult Advanced Hemodynamics Last 24 Hrs  CVP(mm Hg): 6 (28 Sep 2022 20:00) (2 - 11)  CVP(cm H2O): --  CO: --  CI: --  PA: --  PA(mean): --  PCWP: --  SVR: --  SVRI: --  PVR: --  PVRI: --, ABG - ( 28 Sep 2022 18:12 )  pH, Arterial: 7.46  pH, Blood: x     /  pCO2: 30    /  pO2: 114   / HCO3: 21    / Base Excess: -1.5  /  SaO2: 99.0              Mode: AC/ CMV (Assist Control/ Continuous Mandatory Ventilation)  RR (machine): 10  TV (machine): 650  FiO2: 60  PEEP: 5  ITime: 1  MAP: 8  PIP: 24    Intake and output was reviewed and the fluid balance was calculated  Daily Height in cm: 165.1 (28 Sep 2022 09:01)    Daily   I&O's Summary    27 Sep 2022 07:01  -  28 Sep 2022 07:00  --------------------------------------------------------  IN: 0 mL / OUT: 300 mL / NET: -300 mL    28 Sep 2022 07:01  -  28 Sep 2022 20:58  --------------------------------------------------------  IN: 1058 mL / OUT: 1390 mL / NET: -332 mL        All lines and drain sites were assessed  [facility]      POCT Blood Glucose.: 130 mg/dL (28 Sep 2022 19:51)      Neuro: intubated sedated  HEENT: ett  Heart: s1 s2  Lungs: clear bl  Abdomen: soft nt nd  Extremities: 2+dp      labs  CBC Full  -  ( 28 Sep 2022 18:13 )  WBC Count : 11.42 K/uL  RBC Count : 4.00 M/uL  Hemoglobin : 11.8 g/dL  Hematocrit : 34.2 %  Platelet Count - Automated : 133 K/uL  Mean Cell Volume : 85.5 fl  Mean Cell Hemoglobin : 29.5 pg  Mean Cell Hemoglobin Concentration : 34.5 gm/dL  Auto Neutrophil # : x  Auto Lymphocyte # : x  Auto Monocyte # : x  Auto Eosinophil # : x  Auto Basophil # : x  Auto Neutrophil % : x  Auto Lymphocyte % : x  Auto Monocyte % : x  Auto Eosinophil % : x  Auto Basophil % : x    09-28    136  |  105  |  17  ----------------------------<  210<H>  4.7   |  22  |  0.95    Ca    7.8<L>      28 Sep 2022 18:13  Phos  1.7     09-28  Mg     2.2     09-28    TPro  5.3<L>  /  Alb  3.4  /  TBili  1.0  /  DBili  x   /  AST  92<H>  /  ALT  39  /  AlkPhos  42  09-28    PT/INR - ( 28 Sep 2022 18:13 )   PT: 12.8 sec;   INR: 1.07          PTT - ( 28 Sep 2022 18:13 )  PTT:52.0 sec  The current medications were reviewed   MEDICATIONS  (STANDING):  aspirin enteric coated 81 milliGRAM(s) Oral daily  ceFAZolin   IVPB 2000 milliGRAM(s) IV Intermittent every 8 hours  chlorhexidine 0.12% Liquid 15 milliLiter(s) Oral Mucosa every 12 hours  chlorhexidine 2% Cloths 1 Application(s) Topical daily  dextrose 50% Injectable 50 milliLiter(s) IV Push every 15 minutes  dextrose 50% Injectable 25 milliLiter(s) IV Push every 15 minutes  heparin   Injectable 5000 Unit(s) SubCutaneous every 8 hours  insulin regular Infusion 1 Unit(s)/Hr (1 mL/Hr) IV Continuous <Continuous>  niCARdipine Infusion 5 mG/Hr (25 mL/Hr) IV Continuous <Continuous>  pantoprazole    Tablet 40 milliGRAM(s) Oral once  polyethylene glycol 3350 17 Gram(s) Oral daily  propofol Infusion 10 MICROgram(s)/kG/Min (4.46 mL/Hr) IV Continuous <Continuous>  sodium chloride 0.9%. 1000 milliLiter(s) (10 mL/Hr) IV Continuous <Continuous>    MEDICATIONS  (PRN):      Assessment/Plan:  56yr old male with PMH HTN, HLD, DM, PAD, CKD, CVA x 3 with residual R sided weakness, Tx from [place] for abnl stress test and found to have multi vessel CAD.    Sp CABG x 3 (LIMA-LAD, SVG-RCA, SVG-Ramus, EF 45%, Javier, 9/28)  Acute respiratory failure requiring mechanical ventilation-wean to extubate  Fu post op labs  Fu post op cxr  Monitor CT output  Aspirin  Statin  Replete lytes prn  GI/DVT PPX  Bowel Regimen  Pain control  Close hemodynamic, ventilatory and drain monitoring and management per post op routine  Monitor for arrhythmias and monitor parameters for organ perfusion  Beta blockade not administered due to hemodynamic instability and bradycardia  Monitor neurologic status  Head of the bed should remain elevated to 45 deg   Chest PT and IS will be encouraged  Monitor adequacy of oxygenation and ventilation and attempt to wean oxygen  Antibiotic regimen will be tailored to the clinical, laboratory and microbiologic data  Nutritional goals will be met using po eventually, ensure adequate caloric intake and monitor the same  Stress ulcer and VTE prophylaxis will be achieved    Glycemic control is satisfactory  Electrolytes have been repleted as necessary and wound care has been carried out   Pain control has been achieved.   Aggressive physical therapy and early mobility and ambulation goals will be met   The family was updated about the course and plan.    CRITICAL CARE TIME personally provided by me  in evaluation and management, reassessments, review and interpretation of labs and x-rays, ventilator and hemodynamic management, formulating a plan and coordinating care: __30____ MIN.  Time does not include procedural time.    CTICU ATTENDING     					  Caleb Gallardo MD

## 2022-09-28 NOTE — PROGRESS NOTE ADULT - SUBJECTIVE AND OBJECTIVE BOX
CTICU  CRITICAL  CARE  attending     Hand off received 					   Pertinent clinical, laboratory, radiographic, hemodynamic, echocardiographic, respiratory data, microbiologic data and chart were reviewed and analyzed frequently throughout the course of the day and night  Patient seen and examined with CTS/ SH attending at bedside  Pt is a 56y , Male, HEALTH ISSUES - PROBLEM Dx:  DM (diabetes mellitus)        , FAMILY HISTORY:  FH: stroke (Father)    Family history of diabetes mellitus (DM) (Mother)    PAST MEDICAL & SURGICAL HISTORY:  HTN (hypertension)      HLD (hyperlipidemia)      DM (diabetes mellitus)      Cerebrovascular accident (CVA)      PAD (peripheral artery disease)      CKD (chronic kidney disease)      No significant past surgical history        Patient is a 56y old  Male who presents with a chief complaint of CAD (28 Sep 2022 20:58)      14 system review limited by mentation and multiorgan morbidity     Vital signs, hemodynamic and respiratory parameters were reviewed from the bedside nursing flowsheet.  ICU Vital Signs Last 24 Hrs  T(C): 36.9 (28 Sep 2022 21:33), Max: 36.9 (28 Sep 2022 21:33)  T(F): 98.5 (28 Sep 2022 21:33), Max: 98.5 (28 Sep 2022 21:33)  HR: 96 (28 Sep 2022 22:00) (65 - 96)  BP: 129/63 (28 Sep 2022 09:01) (129/63 - 133/63)  BP(mean): 90 (28 Sep 2022 04:43) (90 - 91)  ABP: 115/55 (28 Sep 2022 22:00) (106/62 - 140/63)  ABP(mean): 74 (28 Sep 2022 22:00) (72 - 88)  RR: 16 (28 Sep 2022 22:00) (12 - 25)  SpO2: 100% (28 Sep 2022 22:00) (94% - 100%)    O2 Parameters below as of 28 Sep 2022 22:00  Patient On (Oxygen Delivery Method): ventilator,cpap mode  O2 Flow (L/min): 40        Adult Advanced Hemodynamics Last 24 Hrs  CVP(mm Hg): 3 (28 Sep 2022 22:00) (2 - 11)  CVP(cm H2O): --  CO: --  CI: --  PA: --  PA(mean): --  PCWP: --  SVR: --  SVRI: --  PVR: --  PVRI: --, ABG - ( 28 Sep 2022 21:49 )  pH, Arterial: 7.44  pH, Blood: x     /  pCO2: 31    /  pO2: 133   / HCO3: 21    / Base Excess: -2.1  /  SaO2: 98.7              Mode: AC/ CMV (Assist Control/ Continuous Mandatory Ventilation)  RR (machine): 10  TV (machine): 650  FiO2: 60  PEEP: 5  ITime: 1  MAP: 8  PIP: 24    Intake and output was reviewed and the fluid balance was calculated  Daily Height in cm: 165.1 (28 Sep 2022 09:01)    Daily   I&O's Summary    27 Sep 2022 07:01  -  28 Sep 2022 07:00  --------------------------------------------------------  IN: 0 mL / OUT: 300 mL / NET: -300 mL    28 Sep 2022 07:01  -  28 Sep 2022 22:24  --------------------------------------------------------  IN: 1113 mL / OUT: 1480 mL / NET: -367 mL        All lines and drain sites were assessed  Glycemic trend was reviewedCAPILLARY BLOOD GLUCOSE      POCT Blood Glucose.: 152 mg/dL (28 Sep 2022 22:00)    No acute change in focality  Auscultation of the chest reveals equal bs  Abdomen is soft  Extremities are warm and well perfused  Wounds appear clean and unremarkable  Antibiotics are periop    labs  CBC Full  -  ( 28 Sep 2022 18:13 )  WBC Count : 11.42 K/uL  RBC Count : 4.00 M/uL  Hemoglobin : 11.8 g/dL  Hematocrit : 34.2 %  Platelet Count - Automated : 133 K/uL  Mean Cell Volume : 85.5 fl  Mean Cell Hemoglobin : 29.5 pg  Mean Cell Hemoglobin Concentration : 34.5 gm/dL  Auto Neutrophil # : x  Auto Lymphocyte # : x  Auto Monocyte # : x  Auto Eosinophil # : x  Auto Basophil # : x  Auto Neutrophil % : x  Auto Lymphocyte % : x  Auto Monocyte % : x  Auto Eosinophil % : x  Auto Basophil % : x    09-28    136  |  105  |  17  ----------------------------<  210<H>  4.7   |  22  |  0.95    Ca    7.8<L>      28 Sep 2022 18:13  Phos  1.7     09-28  Mg     2.2     09-28    TPro  5.3<L>  /  Alb  3.4  /  TBili  1.0  /  DBili  x   /  AST  92<H>  /  ALT  39  /  AlkPhos  42  09-28    PT/INR - ( 28 Sep 2022 18:13 )   PT: 12.8 sec;   INR: 1.07          PTT - ( 28 Sep 2022 18:13 )  PTT:52.0 sec  The current medications were reviewed   MEDICATIONS  (STANDING):  aspirin enteric coated 81 milliGRAM(s) Oral daily  ceFAZolin   IVPB 2000 milliGRAM(s) IV Intermittent every 8 hours  chlorhexidine 0.12% Liquid 15 milliLiter(s) Oral Mucosa every 12 hours  chlorhexidine 2% Cloths 1 Application(s) Topical daily  dextrose 50% Injectable 50 milliLiter(s) IV Push every 15 minutes  dextrose 50% Injectable 25 milliLiter(s) IV Push every 15 minutes  heparin   Injectable 5000 Unit(s) SubCutaneous every 8 hours  insulin regular Infusion 1 Unit(s)/Hr (1 mL/Hr) IV Continuous <Continuous>  niCARdipine Infusion 5 mG/Hr (25 mL/Hr) IV Continuous <Continuous>  pantoprazole    Tablet 40 milliGRAM(s) Oral once  polyethylene glycol 3350 17 Gram(s) Oral daily  propofol Infusion 10 MICROgram(s)/kG/Min (4.46 mL/Hr) IV Continuous <Continuous>  sodium chloride 0.9%. 1000 milliLiter(s) (10 mL/Hr) IV Continuous <Continuous>    MEDICATIONS  (PRN):       PROBLEM LIST/ ASSESSMENT:  HEALTH ISSUES - PROBLEM Dx:  DM (diabetes mellitus)        ,   Patient is a 56y old  Male who presents with a chief complaint of CAD (28 Sep 2022 20:58)     s/p cardiac surgery                My plan includes :  close hemodynamic, ventilatory and drain monitoring and management per post op routine    Monitor for arrhythmias and monitor parameters for organ perfusion  beta blockade not administered due to hemodynamic instability and bradycardia  monitor neurologic status  Head of the bed should remain elevated to 45 deg .   chest PT and IS will be encouraged  monitor adequacy of oxygenation and ventilation and attempt to wean oxygen  antibiotic regimen will be tailored to the clinical, laboratory and microbiologic data  Nutritional goals will be met using po eventually , ensure adequate caloric intake and montior the same  Stress ulcer and VTE prophylaxis will be achieved    Glycemic control is satisfactory  Electrolytes have been repleted as necessary and wound care has been carried out. Pain control has been achieved.   agressive physical therapy and early mobility and ambulation goals will be met   The family was updated about the course and plan  CRITICAL CARE TIME personally provided by me  in evaluation and management, reassessments, review and interpretation of labs and x-rays, ventilator and hemodynamic management, formulating a plan and coordinating care: ___90____ MIN.  Time does not include procedural time. Time spent was non routine post-operarive caRE and included multiple and repeated evaluations at the bedside  CTICU ATTENDING     					    López Landeros MD

## 2022-09-29 LAB
ALBUMIN SERPL ELPH-MCNC: 3.1 G/DL — LOW (ref 3.3–5)
ALP SERPL-CCNC: 37 U/L — LOW (ref 40–120)
ALT FLD-CCNC: 28 U/L — SIGNIFICANT CHANGE UP (ref 10–45)
ANION GAP SERPL CALC-SCNC: 11 MMOL/L — SIGNIFICANT CHANGE UP (ref 5–17)
ANION GAP SERPL CALC-SCNC: 16 MMOL/L — SIGNIFICANT CHANGE UP (ref 5–17)
APTT BLD: 29.4 SEC — SIGNIFICANT CHANGE UP (ref 27.5–35.5)
AST SERPL-CCNC: 62 U/L — HIGH (ref 10–40)
BASE EXCESS BLDV CALC-SCNC: -0.3 MMOL/L — SIGNIFICANT CHANGE UP (ref -2–3)
BILIRUB SERPL-MCNC: 0.4 MG/DL — SIGNIFICANT CHANGE UP (ref 0.2–1.2)
BUN SERPL-MCNC: 15 MG/DL — SIGNIFICANT CHANGE UP (ref 7–23)
BUN SERPL-MCNC: 16 MG/DL — SIGNIFICANT CHANGE UP (ref 7–23)
CA-I SERPL-SCNC: 1.09 MMOL/L — LOW (ref 1.15–1.33)
CALCIUM SERPL-MCNC: 7.8 MG/DL — LOW (ref 8.4–10.5)
CALCIUM SERPL-MCNC: 7.9 MG/DL — LOW (ref 8.4–10.5)
CHLORIDE SERPL-SCNC: 102 MMOL/L — SIGNIFICANT CHANGE UP (ref 96–108)
CHLORIDE SERPL-SCNC: 106 MMOL/L — SIGNIFICANT CHANGE UP (ref 96–108)
CO2 BLDV-SCNC: 25.3 MMOL/L — SIGNIFICANT CHANGE UP (ref 22–26)
CO2 SERPL-SCNC: 20 MMOL/L — LOW (ref 22–31)
CO2 SERPL-SCNC: 22 MMOL/L — SIGNIFICANT CHANGE UP (ref 22–31)
CREAT SERPL-MCNC: 1.13 MG/DL — SIGNIFICANT CHANGE UP (ref 0.5–1.3)
CREAT SERPL-MCNC: 1.24 MG/DL — SIGNIFICANT CHANGE UP (ref 0.5–1.3)
EGFR: 68 ML/MIN/1.73M2 — SIGNIFICANT CHANGE UP
EGFR: 76 ML/MIN/1.73M2 — SIGNIFICANT CHANGE UP
GAS PNL BLDA: SIGNIFICANT CHANGE UP
GAS PNL BLDV: 132 MMOL/L — LOW (ref 136–145)
GAS PNL BLDV: SIGNIFICANT CHANGE UP
GLUCOSE BLDC GLUCOMTR-MCNC: 103 MG/DL — HIGH (ref 70–99)
GLUCOSE BLDC GLUCOMTR-MCNC: 112 MG/DL — HIGH (ref 70–99)
GLUCOSE BLDC GLUCOMTR-MCNC: 131 MG/DL — HIGH (ref 70–99)
GLUCOSE BLDC GLUCOMTR-MCNC: 155 MG/DL — HIGH (ref 70–99)
GLUCOSE BLDC GLUCOMTR-MCNC: 158 MG/DL — HIGH (ref 70–99)
GLUCOSE BLDC GLUCOMTR-MCNC: 170 MG/DL — HIGH (ref 70–99)
GLUCOSE BLDC GLUCOMTR-MCNC: 175 MG/DL — HIGH (ref 70–99)
GLUCOSE BLDC GLUCOMTR-MCNC: 186 MG/DL — HIGH (ref 70–99)
GLUCOSE BLDC GLUCOMTR-MCNC: 319 MG/DL — HIGH (ref 70–99)
GLUCOSE BLDC GLUCOMTR-MCNC: 396 MG/DL — HIGH (ref 70–99)
GLUCOSE SERPL-MCNC: 118 MG/DL — HIGH (ref 70–99)
GLUCOSE SERPL-MCNC: 313 MG/DL — HIGH (ref 70–99)
HAV IGM SER-ACNC: SIGNIFICANT CHANGE UP
HBV CORE IGM SER-ACNC: SIGNIFICANT CHANGE UP
HBV SURFACE AG SER-ACNC: SIGNIFICANT CHANGE UP
HCO3 BLDV-SCNC: 24 MMOL/L — SIGNIFICANT CHANGE UP (ref 22–29)
HCT VFR BLD CALC: 28.8 % — LOW (ref 39–50)
HCT VFR BLD CALC: 29.9 % — LOW (ref 39–50)
HCV AB S/CO SERPL IA: 0.83 S/CO — SIGNIFICANT CHANGE UP
HCV AB SERPL-IMP: SIGNIFICANT CHANGE UP
HGB BLD-MCNC: 10.4 G/DL — LOW (ref 13–17)
HGB BLD-MCNC: 9.8 G/DL — LOW (ref 13–17)
INR BLD: 1.17 — HIGH (ref 0.88–1.16)
ISTAT ACTK (ACTIVATED CLOTTING TIME KAOLIN): 121 SEC — SIGNIFICANT CHANGE UP (ref 74–137)
ISTAT ACTK (ACTIVATED CLOTTING TIME KAOLIN): 121 SEC — SIGNIFICANT CHANGE UP (ref 74–137)
ISTAT ACTK (ACTIVATED CLOTTING TIME KAOLIN): 335 SEC — HIGH (ref 74–137)
ISTAT ACTK (ACTIVATED CLOTTING TIME KAOLIN): 399 SEC — HIGH (ref 74–137)
ISTAT ACTK (ACTIVATED CLOTTING TIME KAOLIN): 399 SEC — HIGH (ref 74–137)
ISTAT ACTK (ACTIVATED CLOTTING TIME KAOLIN): 416 SEC — HIGH (ref 74–137)
ISTAT ACTK (ACTIVATED CLOTTING TIME KAOLIN): 445 SEC — HIGH (ref 74–137)
ISTAT ACTK (ACTIVATED CLOTTING TIME KAOLIN): 451 SEC — HIGH (ref 74–137)
ISTAT ACTK (ACTIVATED CLOTTING TIME KAOLIN): 509 SEC — HIGH (ref 74–137)
ISTAT ACTK (ACTIVATED CLOTTING TIME KAOLIN): 572 SEC — HIGH (ref 74–137)
MAGNESIUM SERPL-MCNC: 2 MG/DL — SIGNIFICANT CHANGE UP (ref 1.6–2.6)
MAGNESIUM SERPL-MCNC: 2 MG/DL — SIGNIFICANT CHANGE UP (ref 1.6–2.6)
MCHC RBC-ENTMCNC: 29.9 PG — SIGNIFICANT CHANGE UP (ref 27–34)
MCHC RBC-ENTMCNC: 30.1 PG — SIGNIFICANT CHANGE UP (ref 27–34)
MCHC RBC-ENTMCNC: 34 GM/DL — SIGNIFICANT CHANGE UP (ref 32–36)
MCHC RBC-ENTMCNC: 34.8 GM/DL — SIGNIFICANT CHANGE UP (ref 32–36)
MCV RBC AUTO: 85.9 FL — SIGNIFICANT CHANGE UP (ref 80–100)
MCV RBC AUTO: 88.3 FL — SIGNIFICANT CHANGE UP (ref 80–100)
NRBC # BLD: 0 /100 WBCS — SIGNIFICANT CHANGE UP (ref 0–0)
NRBC # BLD: 0 /100 WBCS — SIGNIFICANT CHANGE UP (ref 0–0)
PCO2 BLDV: 38 MMHG — LOW (ref 42–55)
PH BLDV: 7.41 — SIGNIFICANT CHANGE UP (ref 7.32–7.43)
PHOSPHATE SERPL-MCNC: 3.2 MG/DL — SIGNIFICANT CHANGE UP (ref 2.5–4.5)
PLATELET # BLD AUTO: 120 K/UL — LOW (ref 150–400)
PLATELET # BLD AUTO: 137 K/UL — LOW (ref 150–400)
PO2 BLDV: 34 MMHG — SIGNIFICANT CHANGE UP (ref 25–45)
POTASSIUM BLDV-SCNC: 4.5 MMOL/L — SIGNIFICANT CHANGE UP (ref 3.5–5.1)
POTASSIUM SERPL-MCNC: 4.7 MMOL/L — SIGNIFICANT CHANGE UP (ref 3.5–5.3)
POTASSIUM SERPL-MCNC: 5 MMOL/L — SIGNIFICANT CHANGE UP (ref 3.5–5.3)
POTASSIUM SERPL-SCNC: 4.7 MMOL/L — SIGNIFICANT CHANGE UP (ref 3.5–5.3)
POTASSIUM SERPL-SCNC: 5 MMOL/L — SIGNIFICANT CHANGE UP (ref 3.5–5.3)
PROT SERPL-MCNC: 5.1 G/DL — LOW (ref 6–8.3)
PROTHROM AB SERPL-ACNC: 13.9 SEC — HIGH (ref 10.5–13.4)
RBC # BLD: 3.26 M/UL — LOW (ref 4.2–5.8)
RBC # BLD: 3.48 M/UL — LOW (ref 4.2–5.8)
RBC # FLD: 15.4 % — HIGH (ref 10.3–14.5)
RBC # FLD: 15.9 % — HIGH (ref 10.3–14.5)
SAO2 % BLDV: 61 % — LOW (ref 67–88)
SODIUM SERPL-SCNC: 138 MMOL/L — SIGNIFICANT CHANGE UP (ref 135–145)
SODIUM SERPL-SCNC: 139 MMOL/L — SIGNIFICANT CHANGE UP (ref 135–145)
WBC # BLD: 10.43 K/UL — SIGNIFICANT CHANGE UP (ref 3.8–10.5)
WBC # BLD: 11.01 K/UL — HIGH (ref 3.8–10.5)
WBC # FLD AUTO: 10.43 K/UL — SIGNIFICANT CHANGE UP (ref 3.8–10.5)
WBC # FLD AUTO: 11.01 K/UL — HIGH (ref 3.8–10.5)

## 2022-09-29 PROCEDURE — 99232 SBSQ HOSP IP/OBS MODERATE 35: CPT | Mod: GC

## 2022-09-29 PROCEDURE — 71045 X-RAY EXAM CHEST 1 VIEW: CPT | Mod: 26

## 2022-09-29 PROCEDURE — 71045 X-RAY EXAM CHEST 1 VIEW: CPT | Mod: 26,77

## 2022-09-29 PROCEDURE — 99291 CRITICAL CARE FIRST HOUR: CPT

## 2022-09-29 PROCEDURE — 99292 CRITICAL CARE ADDL 30 MIN: CPT

## 2022-09-29 RX ORDER — ACETAMINOPHEN 500 MG
650 TABLET ORAL EVERY 6 HOURS
Refills: 0 | Status: DISCONTINUED | OUTPATIENT
Start: 2022-09-29 | End: 2022-10-02

## 2022-09-29 RX ORDER — ALBUMIN HUMAN 25 %
250 VIAL (ML) INTRAVENOUS
Refills: 0 | Status: COMPLETED | OUTPATIENT
Start: 2022-09-29 | End: 2022-09-29

## 2022-09-29 RX ORDER — OXYCODONE HYDROCHLORIDE 5 MG/1
10 TABLET ORAL EVERY 6 HOURS
Refills: 0 | Status: DISCONTINUED | OUTPATIENT
Start: 2022-09-29 | End: 2022-09-29

## 2022-09-29 RX ORDER — ATORVASTATIN CALCIUM 80 MG/1
40 TABLET, FILM COATED ORAL AT BEDTIME
Refills: 0 | Status: DISCONTINUED | OUTPATIENT
Start: 2022-09-29 | End: 2022-10-02

## 2022-09-29 RX ORDER — INSULIN LISPRO 100/ML
VIAL (ML) SUBCUTANEOUS
Refills: 0 | Status: DISCONTINUED | OUTPATIENT
Start: 2022-09-29 | End: 2022-10-02

## 2022-09-29 RX ORDER — PANTOPRAZOLE SODIUM 20 MG/1
40 TABLET, DELAYED RELEASE ORAL
Refills: 0 | Status: DISCONTINUED | OUTPATIENT
Start: 2022-09-29 | End: 2022-10-02

## 2022-09-29 RX ORDER — INSULIN GLARGINE 100 [IU]/ML
20 INJECTION, SOLUTION SUBCUTANEOUS AT BEDTIME
Refills: 0 | Status: DISCONTINUED | OUTPATIENT
Start: 2022-09-29 | End: 2022-09-30

## 2022-09-29 RX ORDER — DEXTROSE 50 % IN WATER 50 %
12.5 SYRINGE (ML) INTRAVENOUS ONCE
Refills: 0 | Status: DISCONTINUED | OUTPATIENT
Start: 2022-09-29 | End: 2022-10-02

## 2022-09-29 RX ORDER — ALBUMIN HUMAN 25 %
250 VIAL (ML) INTRAVENOUS ONCE
Refills: 0 | Status: COMPLETED | OUTPATIENT
Start: 2022-09-29 | End: 2022-09-29

## 2022-09-29 RX ORDER — INSULIN LISPRO 100/ML
8 VIAL (ML) SUBCUTANEOUS
Refills: 0 | Status: DISCONTINUED | OUTPATIENT
Start: 2022-09-29 | End: 2022-09-30

## 2022-09-29 RX ORDER — FUROSEMIDE 40 MG
10 TABLET ORAL ONCE
Refills: 0 | Status: COMPLETED | OUTPATIENT
Start: 2022-09-29 | End: 2022-09-29

## 2022-09-29 RX ORDER — LEVOTHYROXINE SODIUM 125 MCG
50 TABLET ORAL DAILY
Refills: 0 | Status: DISCONTINUED | OUTPATIENT
Start: 2022-09-29 | End: 2022-10-02

## 2022-09-29 RX ORDER — SENNA PLUS 8.6 MG/1
2 TABLET ORAL AT BEDTIME
Refills: 0 | Status: DISCONTINUED | OUTPATIENT
Start: 2022-09-29 | End: 2022-10-02

## 2022-09-29 RX ORDER — INSULIN GLARGINE 100 [IU]/ML
10 INJECTION, SOLUTION SUBCUTANEOUS AT BEDTIME
Refills: 0 | Status: DISCONTINUED | OUTPATIENT
Start: 2022-09-29 | End: 2022-09-29

## 2022-09-29 RX ORDER — GABAPENTIN 400 MG/1
300 CAPSULE ORAL EVERY 12 HOURS
Refills: 0 | Status: DISCONTINUED | OUTPATIENT
Start: 2022-09-29 | End: 2022-10-02

## 2022-09-29 RX ORDER — HUMAN INSULIN 100 [IU]/ML
5 INJECTION, SUSPENSION SUBCUTANEOUS ONCE
Refills: 0 | Status: COMPLETED | OUTPATIENT
Start: 2022-09-29 | End: 2022-09-29

## 2022-09-29 RX ORDER — INSULIN LISPRO 100/ML
3 VIAL (ML) SUBCUTANEOUS
Refills: 0 | Status: DISCONTINUED | OUTPATIENT
Start: 2022-09-29 | End: 2022-09-29

## 2022-09-29 RX ORDER — OXYCODONE HYDROCHLORIDE 5 MG/1
5 TABLET ORAL EVERY 4 HOURS
Refills: 0 | Status: DISCONTINUED | OUTPATIENT
Start: 2022-09-29 | End: 2022-10-02

## 2022-09-29 RX ORDER — DEXTROSE 50 % IN WATER 50 %
25 SYRINGE (ML) INTRAVENOUS ONCE
Refills: 0 | Status: DISCONTINUED | OUTPATIENT
Start: 2022-09-29 | End: 2022-10-02

## 2022-09-29 RX ORDER — METOPROLOL TARTRATE 50 MG
12.5 TABLET ORAL EVERY 12 HOURS
Refills: 0 | Status: DISCONTINUED | OUTPATIENT
Start: 2022-09-29 | End: 2022-09-30

## 2022-09-29 RX ADMIN — Medication 10 MILLIGRAM(S): at 14:10

## 2022-09-29 RX ADMIN — Medication 100 MILLIGRAM(S): at 14:04

## 2022-09-29 RX ADMIN — Medication 100 MILLIGRAM(S): at 21:30

## 2022-09-29 RX ADMIN — Medication 3 UNIT(S): at 12:31

## 2022-09-29 RX ADMIN — Medication 10: at 21:26

## 2022-09-29 RX ADMIN — SENNA PLUS 2 TABLET(S): 8.6 TABLET ORAL at 21:30

## 2022-09-29 RX ADMIN — ATORVASTATIN CALCIUM 40 MILLIGRAM(S): 80 TABLET, FILM COATED ORAL at 21:30

## 2022-09-29 RX ADMIN — POTASSIUM PHOSPHATE, MONOBASIC POTASSIUM PHOSPHATE, DIBASIC 62.5 MILLIMOLE(S): 236; 224 INJECTION, SOLUTION INTRAVENOUS at 00:43

## 2022-09-29 RX ADMIN — Medication 8 UNIT(S): at 18:18

## 2022-09-29 RX ADMIN — GABAPENTIN 300 MILLIGRAM(S): 400 CAPSULE ORAL at 19:31

## 2022-09-29 RX ADMIN — OXYCODONE HYDROCHLORIDE 5 MILLIGRAM(S): 5 TABLET ORAL at 20:40

## 2022-09-29 RX ADMIN — Medication 125 MILLILITER(S): at 15:25

## 2022-09-29 RX ADMIN — HEPARIN SODIUM 5000 UNIT(S): 5000 INJECTION INTRAVENOUS; SUBCUTANEOUS at 14:04

## 2022-09-29 RX ADMIN — HEPARIN SODIUM 5000 UNIT(S): 5000 INJECTION INTRAVENOUS; SUBCUTANEOUS at 05:11

## 2022-09-29 RX ADMIN — Medication 125 MILLILITER(S): at 10:39

## 2022-09-29 RX ADMIN — INSULIN GLARGINE 20 UNIT(S): 100 INJECTION, SOLUTION SUBCUTANEOUS at 21:29

## 2022-09-29 RX ADMIN — Medication 81 MILLIGRAM(S): at 14:04

## 2022-09-29 RX ADMIN — Medication 650 MILLIGRAM(S): at 18:45

## 2022-09-29 RX ADMIN — POLYETHYLENE GLYCOL 3350 17 GRAM(S): 17 POWDER, FOR SOLUTION ORAL at 14:04

## 2022-09-29 RX ADMIN — HEPARIN SODIUM 5000 UNIT(S): 5000 INJECTION INTRAVENOUS; SUBCUTANEOUS at 21:30

## 2022-09-29 RX ADMIN — HUMAN INSULIN 5 UNIT(S): 100 INJECTION, SUSPENSION SUBCUTANEOUS at 08:59

## 2022-09-29 RX ADMIN — OXYCODONE HYDROCHLORIDE 5 MILLIGRAM(S): 5 TABLET ORAL at 21:45

## 2022-09-29 RX ADMIN — Medication 50 MICROGRAM(S): at 08:49

## 2022-09-29 RX ADMIN — Medication 100 MILLIGRAM(S): at 05:11

## 2022-09-29 RX ADMIN — Medication 30 MILLIGRAM(S): at 05:42

## 2022-09-29 RX ADMIN — CHLORHEXIDINE GLUCONATE 15 MILLILITER(S): 213 SOLUTION TOPICAL at 05:11

## 2022-09-29 RX ADMIN — Medication 12.5 MILLIGRAM(S): at 19:32

## 2022-09-29 RX ADMIN — Medication 2: at 12:29

## 2022-09-29 RX ADMIN — CHLORHEXIDINE GLUCONATE 1 APPLICATION(S): 213 SOLUTION TOPICAL at 12:38

## 2022-09-29 RX ADMIN — Medication 30 MILLIGRAM(S): at 05:12

## 2022-09-29 RX ADMIN — Medication 650 MILLIGRAM(S): at 18:12

## 2022-09-29 RX ADMIN — Medication 8: at 18:17

## 2022-09-29 NOTE — PROGRESS NOTE ADULT - ASSESSMENT
57 yo M with PMHx of HTN, HLD, DM, PAD, CKD, CVA x3 (2013, 2014, 2015) with residual right sided weakness and aphasia, who originally presented to [facility] following an abnormal stress test. During workup patient had Coronary Catheterization showing pLAD 80% stenosis, Diagonal 1 70% stenosis, Diagonal 2 80% stenosis, Ramus 70% stenosis, OM1 90% stenosis, mRCA 85% stenosis, EF 60%. Patient transferred to [facility] under the care of Dr. Herrera, for CABG. 9/28 pt received CABG x3 (LIMA-LAD, SVG-RCA, SVG-Ramus). Pt brought to ICU after procedure. POD1 pt stable, brought to SDU with central line, jose luis, and L pleural tube. Pt with BPs in range of 100s/50s, pt given albumin with lasix. L pleural tube removed without incident.     Plan:    Neurovascular:   -Pain well controlled with current regimen. PRN's: Tylenol, oxycodone, gabapentin  -CVA    -seen and examine by neuro, they do not feel that there are any new defecits at this time, reccomend Q4h neuro checks, continue asa and lipitor, and to f/u with stroke team prior to DC    Cardiovascular:   -Hemodynamically stable.   -Monitor: BP, HR, tele  -CAD    -ASA, lipitor, lasix    -pt required albumin for mild hypotension on arrival to SDU, BPs stable    Respiratory:   -Oxygenating well on room air  -Encourage continued use of IS 10x/hr and frequent ambulation  -CXR: no gross PTX or pleural effusions     GI:  -GI PPX: protonix  -PO Diet  -Bowel Regimen: miralax, senna    Renal / :  -Continue to monitor renal function: BUN/Cr 16/1.24  -Monitor I/O's daily     Endocrine:    -DM    -ISS  -A1c: 7.6  -Endo team following, awaiting reccs  -TSH:    Hematologic:  -CBC: H/H- 10.4/29.9  -INR 1.17    ID:  -Temperature: 36.4  -CBC: WBC- 10.4  -Continue to observe for SIRS/Sepsis Syndrome.    Prophylaxis:  -DVT prophylaxis with 5000 SubQ Heparin q8h.  -Continue with SCD's b/l while patient is at rest     Disposition:  -Discharge home once patient is medically ready   57 yo M with PMHx of HTN, HLD, DM, PAD, CKD, CVA x3 (2013, 2014, 2015) with residual right sided weakness and aphasia, who originally presented to [facility] following an abnormal stress test. During workup patient had Coronary Catheterization showing pLAD 80% stenosis, Diagonal 1 70% stenosis, Diagonal 2 80% stenosis, Ramus 70% stenosis, OM1 90% stenosis, mRCA 85% stenosis, EF 60%. Patient transferred to [facility] under the care of Dr. Herrera, for CABG. 9/28 pt received CABG x3 (LIMA-LAD, SVG-RCA, SVG-Ramus). Pt brought to ICU after procedure. POD1 pt stable, brought to SDU with central line, jose luis, and L pleural tube. Pt with BPs in range of 100s/50s, pt given albumin with lasix. L pleural tube removed without incident.     Plan:    Neurovascular:   -Pain well controlled with current regimen. PRN's: Tylenol, oxycodone, gabapentin  -CVA    -seen and examine by neuro, they do not feel that there are any new defecits at this time, reccomend Q4h neuro checks, continue asa and lipitor, and to f/u with stroke team prior to DC    Cardiovascular:   -Hemodynamically stable.   -Monitor: BP, HR, tele  -CAD    -ASA, lipitor, lasix    -pt required albumin for mild hypotension on arrival to SDU, BPs stable    Respiratory:   -Oxygenating well on room air  -Encourage continued use of IS 10x/hr and frequent ambulation  -CXR: no gross PTX or pleural effusions     GI:  -GI PPX: protonix  -PO Diet  -Bowel Regimen: miralax, senna    Renal / :  -Continue to monitor renal function: BUN/Cr 16/1.24  -Monitor I/O's daily     Endocrine:    -DM    -ISS    -20 lantus    -8 lispro  -A1c: 7.6  -Appreciate endo reccs  -TSH:    Hematologic:  -CBC: H/H- 10.4/29.9  -INR 1.17    ID:  -Temperature: 36.4  -CBC: WBC- 10.4  -Continue to observe for SIRS/Sepsis Syndrome.    Prophylaxis:  -DVT prophylaxis with 5000 SubQ Heparin q8h.  -Continue with SCD's b/l while patient is at rest     Disposition:  -Discharge home once patient is medically ready

## 2022-09-29 NOTE — PROGRESS NOTE ADULT - SUBJECTIVE AND OBJECTIVE BOX
Neurology Stroke Progress Note    INTERVAL HPI/OVERNIGHT EVENTS:  Patient seen and examined, s/p CABG on 9/28. Chest tubes in place, now stepped down to 9LA. Complaining of bilateral lower extremity hand and feet tingling.     MEDICATIONS  (STANDING):  albumin human  5% IVPB 250 milliLiter(s) IV Intermittent every 30 minutes  aspirin enteric coated 81 milliGRAM(s) Oral daily  atorvastatin 40 milliGRAM(s) Oral at bedtime  ceFAZolin   IVPB 2000 milliGRAM(s) IV Intermittent every 8 hours  chlorhexidine 2% Cloths 1 Application(s) Topical daily  dextrose 50% Injectable 25 Gram(s) IV Push once  dextrose 50% Injectable 12.5 Gram(s) IV Push once  furosemide   Injectable 10 milliGRAM(s) IV Push once  gabapentin 300 milliGRAM(s) Oral every 12 hours  heparin   Injectable 5000 Unit(s) SubCutaneous every 8 hours  insulin glargine Injectable (LANTUS) 10 Unit(s) SubCutaneous at bedtime  insulin lispro (ADMELOG) corrective regimen sliding scale   SubCutaneous Before meals and at bedtime  insulin lispro Injectable (ADMELOG) 3 Unit(s) SubCutaneous three times a day before meals  levothyroxine 50 MICROGram(s) Oral daily  niCARdipine Infusion 5 mG/Hr (25 mL/Hr) IV Continuous <Continuous>  pantoprazole    Tablet 40 milliGRAM(s) Oral before breakfast  polyethylene glycol 3350 17 Gram(s) Oral daily  senna 2 Tablet(s) Oral at bedtime  sodium chloride 0.9%. 1000 milliLiter(s) (10 mL/Hr) IV Continuous <Continuous>    MEDICATIONS  (PRN):  acetaminophen     Tablet .. 650 milliGRAM(s) Oral every 6 hours PRN Mild Pain (1 - 3)  oxyCODONE    IR 5 milliGRAM(s) Oral every 4 hours PRN Moderate Pain (4 - 6)  oxyCODONE    IR 10 milliGRAM(s) Oral every 6 hours PRN Severe Pain (7 - 10)      Allergies    No Known Drug Allergies  shellfish (Rash; Urticaria)    Intolerances        Vital Signs Last 24 Hrs  T(C): 36.4 (29 Sep 2022 08:59), Max: 36.9 (28 Sep 2022 21:33)  T(F): 97.5 (29 Sep 2022 08:59), Max: 98.5 (28 Sep 2022 21:33)  HR: 103 (29 Sep 2022 12:00) (84 - 103)  BP: 116/56 (29 Sep 2022 12:00) (97/54 - 116/56)  BP(mean): 80 (29 Sep 2022 12:00) (71 - 80)  RR: 15 (29 Sep 2022 12:00) (12 - 25)  SpO2: 98% (29 Sep 2022 12:00) (95% - 100%)    Parameters below as of 29 Sep 2022 12:00  Patient On (Oxygen Delivery Method): room air        Physical exam:  General: No acute distress, awake and alert  Eyes: Anicteric sclerae, moist conjunctivae, see below for CNs  Neck: trachea midline  Pulmonary: No use of accessory muscles  GI: Abdomen soft, non-distended, non-tender  Extremities: no edema    Neurologic:  -Mental status: Awake, alert, oriented to person, place, and time. Speech is fluent with intact naming, repetition, and comprehension, no dysarthria. Recent and remote memory intact. Follows commands.   -Cranial nerves:   II: Visual fields are full to confrontation.  III, IV, VI: Extraocular movements are intact without nystagmus. Pupils equally round and reactive to light  VII: R NLFF  Motor: Normal bulk and tone. Strength testing limited due to sternal incision pain. Bilateral upper extremities at least 4/5 with bilaterally equal  strength. Right lower extremity 5-/5. Left lower extremity 5/5.   Sensation: Intact to light touch bilaterally. No neglect or extinction on double simultaneous testing.  Coordination: Unable to perform FTN 2/2 pain from incision site and difficulty fully raising arms.         LABS:                        10.4   10.43 )-----------( 137      ( 29 Sep 2022 03:16 )             29.9     09-29    139  |  106  |  15  ----------------------------<  118<H>  5.0   |  22  |  1.13    Ca    7.9<L>      29 Sep 2022 03:16  Phos  3.2     09-29  Mg     2.0     09-29    TPro  5.1<L>  /  Alb  3.1<L>  /  TBili  0.4  /  DBili  x   /  AST  62<H>  /  ALT  28  /  AlkPhos  37<L>  09-29    PT/INR - ( 29 Sep 2022 03:16 )   PT: 13.9 sec;   INR: 1.17          PTT - ( 29 Sep 2022 03:16 )  PTT:29.4 sec      RADIOLOGY & ADDITIONAL TESTS:

## 2022-09-29 NOTE — PHYSICAL THERAPY INITIAL EVALUATION ADULT - ADDITIONAL COMMENTS
Patient reports he lives in private home with his wife and son with 1 flight of stairs inside. PTA patient was independent with all mobility and ADLs.

## 2022-09-29 NOTE — PROGRESS NOTE ADULT - SUBJECTIVE AND OBJECTIVE BOX
CTICU  CRITICAL  CARE  attending     Hand off received 					   Pertinent clinical, laboratory, radiographic, hemodynamic, echocardiographic, respiratory data, microbiologic data and chart were reviewed and analyzed frequently throughout the course of the day and night  Patient seen and examined with CTS/ SH attending at bedside  Pt is a 56y , Male, HEALTH ISSUES - PROBLEM Dx:  DM (diabetes mellitus)        , FAMILY HISTORY:  FH: stroke (Father)    Family history of diabetes mellitus (DM) (Mother)    PAST MEDICAL & SURGICAL HISTORY:  HTN (hypertension)      HLD (hyperlipidemia)      DM (diabetes mellitus)      Cerebrovascular accident (CVA)      PAD (peripheral artery disease)      CKD (chronic kidney disease)      No significant past surgical history        Patient is a 56y old  Male who presents with a chief complaint of CAD (29 Sep 2022 23:51)      14 system review limited by mentation and multiorgan morbidity     Vital signs, hemodynamic and respiratory parameters were reviewed from the bedside nursing flowsheet.  ICU Vital Signs Last 24 Hrs  T(C): 37.7 (30 Sep 2022 05:01), Max: 37.7 (30 Sep 2022 05:01)  T(F): 99.8 (30 Sep 2022 05:01), Max: 99.8 (30 Sep 2022 05:01)  HR: 90 (30 Sep 2022 07:00) (88 - 114)  BP: 104/55 (30 Sep 2022 07:00) (97/54 - 145/67)  BP(mean): 76 (30 Sep 2022 07:00) (70 - 99)  ABP: 116/35 (29 Sep 2022 15:00) (74/58 - 133/49)  ABP(mean): 43 (29 Sep 2022 15:00) (43 - 71)  RR: 15 (30 Sep 2022 08:00) (13 - 18)  SpO2: 100% (30 Sep 2022 07:00) (94% - 100%)    O2 Parameters below as of 30 Sep 2022 08:00  Patient On (Oxygen Delivery Method): room air          Adult Advanced Hemodynamics Last 24 Hrs  CVP(mm Hg): 10 (29 Sep 2022 17:00) (-2 - 51)  CVP(cm H2O): --  CO: --  CI: --  PA: --  PA(mean): --  PCWP: --  SVR: --  SVRI: --  PVR: --  PVRI: --, ABG - ( 29 Sep 2022 03:16 )  pH, Arterial: 7.48  pH, Blood: x     /  pCO2: 31    /  pO2: 151   / HCO3: 23    / Base Excess: 0.4   /  SaO2: 98.6                Intake and output was reviewed and the fluid balance was calculated  Daily     Daily   I&O's Summary    29 Sep 2022 07:01  -  30 Sep 2022 07:00  --------------------------------------------------------  IN: 1080 mL / OUT: 2285 mL / NET: -1205 mL    30 Sep 2022 07:01  -  30 Sep 2022 07:56  --------------------------------------------------------  IN: 10 mL / OUT: 75 mL / NET: -65 mL        All lines and drain sites were assessed  Glycemic trend was reviewedCAPILLARY BLOOD GLUCOSE      POCT Blood Glucose.: 269 mg/dL (30 Sep 2022 06:14)    No acute change in focality  Auscultation of the chest reveals equal bs  Abdomen is soft  Extremities are warm and well perfused  Wounds appear clean and unremarkable  Antibiotics are periop    labs  CBC Full  -  ( 30 Sep 2022 03:49 )  WBC Count : 11.13 K/uL  RBC Count : 2.98 M/uL  Hemoglobin : 8.8 g/dL  Hematocrit : 26.6 %  Platelet Count - Automated : 121 K/uL  Mean Cell Volume : 89.3 fl  Mean Cell Hemoglobin : 29.5 pg  Mean Cell Hemoglobin Concentration : 33.1 gm/dL  Auto Neutrophil # : x  Auto Lymphocyte # : x  Auto Monocyte # : x  Auto Eosinophil # : x  Auto Basophil # : x  Auto Neutrophil % : x  Auto Lymphocyte % : x  Auto Monocyte % : x  Auto Eosinophil % : x  Auto Basophil % : x    09-30    135  |  102  |  19  ----------------------------<  288<H>  4.4   |  21<L>  |  1.32<H>    Ca    8.0<L>      30 Sep 2022 03:49  Phos  2.0     09-30  Mg     1.9     09-30    TPro  5.7<L>  /  Alb  3.3  /  TBili  0.4  /  DBili  x   /  AST  30  /  ALT  16  /  AlkPhos  40  09-30    PT/INR - ( 30 Sep 2022 03:49 )   PT: 13.9 sec;   INR: 1.17          PTT - ( 30 Sep 2022 03:49 )  PTT:29.8 sec  The current medications were reviewed   MEDICATIONS  (STANDING):  albumin human  5% IVPB 250 milliLiter(s) IV Intermittent every 30 minutes  aspirin enteric coated 81 milliGRAM(s) Oral daily  atorvastatin 40 milliGRAM(s) Oral at bedtime  chlorhexidine 2% Cloths 1 Application(s) Topical daily  dextrose 50% Injectable 25 Gram(s) IV Push once  dextrose 50% Injectable 12.5 Gram(s) IV Push once  gabapentin 300 milliGRAM(s) Oral every 12 hours  heparin   Injectable 5000 Unit(s) SubCutaneous every 8 hours  insulin glargine Injectable (LANTUS) 20 Unit(s) SubCutaneous at bedtime  insulin lispro (ADMELOG) corrective regimen sliding scale   SubCutaneous Before meals and at bedtime  insulin lispro Injectable (ADMELOG) 8 Unit(s) SubCutaneous three times a day before meals  levothyroxine 50 MICROGram(s) Oral daily  metFORMIN 500 milliGRAM(s) Oral every 12 hours  metoprolol tartrate 12.5 milliGRAM(s) Oral every 12 hours  niCARdipine Infusion 5 mG/Hr (25 mL/Hr) IV Continuous <Continuous>  pantoprazole    Tablet 40 milliGRAM(s) Oral before breakfast  polyethylene glycol 3350 17 Gram(s) Oral daily  senna 2 Tablet(s) Oral at bedtime  sodium chloride 0.9%. 1000 milliLiter(s) (10 mL/Hr) IV Continuous <Continuous>    MEDICATIONS  (PRN):  acetaminophen     Tablet .. 650 milliGRAM(s) Oral every 6 hours PRN Mild Pain (1 - 3)  oxyCODONE    IR 5 milliGRAM(s) Oral every 4 hours PRN Moderate Pain (4 - 6)       PROBLEM LIST/ ASSESSMENT:  HEALTH ISSUES - PROBLEM Dx:  DM (diabetes mellitus)        ,   Patient is a 56y old  Male who presents with a chief complaint of CAD (29 Sep 2022 23:51)     s/p cardiac surgery                My plan includes :  close hemodynamic, ventilatory and drain monitoring and management per post op routine    Monitor for arrhythmias and monitor parameters for organ perfusion  beta blockade not administered due to hemodynamic instability and bradycardia  monitor neurologic status  Head of the bed should remain elevated to 45 deg .   chest PT and IS will be encouraged  monitor adequacy of oxygenation and ventilation and attempt to wean oxygen  antibiotic regimen will be tailored to the clinical, laboratory and microbiologic data  Nutritional goals will be met using po eventually , ensure adequate caloric intake and montior the same  Stress ulcer and VTE prophylaxis will be achieved    Glycemic control is satisfactory  Electrolytes have been repleted as necessary and wound care has been carried out. Pain control has been achieved.   agressive physical therapy and early mobility and ambulation goals will be met   The family was updated about the course and plan  CRITICAL CARE TIME personally provided by me  in evaluation and management, reassessments, review and interpretation of labs and x-rays, ventilator and hemodynamic management, formulating a plan and coordinating care: ___90____ MIN.  Time does not include procedural time. Time spent was non routine post-operarive caRE and included multiple and repeated evaluations at the bedside  CTICU ATTENDING     					    López Landeros MD

## 2022-09-29 NOTE — PHYSICAL THERAPY INITIAL EVALUATION ADULT - GENERAL OBSERVATIONS, REHAB EVAL
Spoke with RN Lucien who cleared for OOB. Patient received sitting in chair in NAD with +ECG +CT +hughes +TPM +a-line +central line

## 2022-09-29 NOTE — PROGRESS NOTE ADULT - NSPROGADDITIONALINFOA_GEN_ALL_CORE
Vascular Neurology Fellow Attestation:  Patient discussed with stroke team and agree with above. 57yo man with multiple prior strokes with residual right hemiparesis, admitted for CABG. No new focal neurologic deficits noted pre- or post-op. Recommend continuing aspirin and statin for secondary stroke prevention. Will arrange outpatient stroke clinic follow up for further evaluation and optimization of risk factors given unclear etiology of prior strokes.      Discussed with attending, Dr. Carlson

## 2022-09-29 NOTE — PROGRESS NOTE ADULT - SUBJECTIVE AND OBJECTIVE BOX
SUBJECTIVE / OVERNIGHT EVENTS  Patient was seen and examined this morning. Patient underwent CABG x 3 yesterday (LIMA-LAD, SVG-RCA, SVG-Ramus, EF 45%). Today, he ate jello for breakfast and had jello, soup, 2 buns of bread and chicken for lunch. He complains of pain around his chest where he had the procedure done. Otherwise, he had no concerns or complaints. He mentioned that he's appetite was not that good.     REVIEW OF SYSTEMS  Constitutional:  (+) Loss of appetite. Negative fever, chills.  Eyes:  Negative blurry vision or double vision.  Cardiovascular:  (+) Chest pain. Negative for palpitations.  Respiratory:  Negative for cough, wheezing, or SOB.   Gastrointestinal:  Negative for nausea, vomiting, diarrhea, constipation, or abdominal pain.  Neurologic:  No headache, confusion, dizziness, lightheadedness.    PHYSICAL EXAM  Vital Signs Last 24 Hrs  T(C): 36.6 (29 Sep 2022 21:41), Max: 36.7 (29 Sep 2022 01:01)  T(F): 97.8 (29 Sep 2022 21:41), Max: 98.1 (29 Sep 2022 13:59)  HR: 105 (29 Sep 2022 23:00) (84 - 112)  BP: 124/57 (29 Sep 2022 23:00) (97/54 - 128/58)  BP(mean): 82 (29 Sep 2022 23:00) (70 - 99)  RR: 14 (29 Sep 2022 23:00) (14 - 18)  SpO2: 100% (29 Sep 2022 23:00) (95% - 100%)    Parameters below as of 29 Sep 2022 23:00  Patient On (Oxygen Delivery Method): room air    Constitutional: Awake, alert, male in no acute distress.   HEENT: Normocephalic, atraumatic, LUIS CARLOS.  Respiratory: Lungs clear to ausculation bilaterally.   Cardiovascular: regular rhythm, normal S1 and S2, no audible murmurs. (+) Midline surgical scar.  GI: soft, non-tender, non-distended, bowel sounds present.  Extremities: No lower extremity edema.  Psychiatric: AAO x 3. (+)Anxious mood.      LABS                        9.8    11.01 )-----------( 120      ( 29 Sep 2022 12:49 )             28.8     138  |  102  |  16  ----------------------------<  313<H>  4.7   |  20<L>  |  1.24    Ca    7.8<L>      29 Sep 2022 12:49  Phos  3.2     09-29  Mg     2.0     09-29  TPro  5.1<L>  /  Alb  3.1<L>  /  TBili  0.4  /  DBili  x   /  AST  62<H>  /  ALT  28  /  AlkPhos  37<L>  09-29  PT/INR - ( 29 Sep 2022 03:16 )   PT: 13.9 sec;   INR: 1.17     PTT - ( 29 Sep 2022 03:16 )  PTT:29.4 sec  Thyroid Stimulating Hormone, Serum: 2.360 uIU/mL (09-22 @ 21:26)    MEDICATIONS  MEDICATIONS  (STANDING):  albumin human  5% IVPB 250 milliLiter(s) IV Intermittent every 30 minutes  aspirin enteric coated 81 milliGRAM(s) Oral daily  atorvastatin 40 milliGRAM(s) Oral at bedtime  ceFAZolin   IVPB 2000 milliGRAM(s) IV Intermittent every 8 hours  chlorhexidine 2% Cloths 1 Application(s) Topical daily  dextrose 50% Injectable 25 Gram(s) IV Push once  dextrose 50% Injectable 12.5 Gram(s) IV Push once  gabapentin 300 milliGRAM(s) Oral every 12 hours  heparin   Injectable 5000 Unit(s) SubCutaneous every 8 hours  insulin glargine Injectable (LANTUS) 20 Unit(s) SubCutaneous at bedtime  insulin lispro (ADMELOG) corrective regimen sliding scale   SubCutaneous Before meals and at bedtime  insulin lispro Injectable (ADMELOG) 8 Unit(s) SubCutaneous three times a day before meals  levothyroxine 50 MICROGram(s) Oral daily  metoprolol tartrate 12.5 milliGRAM(s) Oral every 12 hours  niCARdipine Infusion 5 mG/Hr (25 mL/Hr) IV Continuous <Continuous>  pantoprazole    Tablet 40 milliGRAM(s) Oral before breakfast  polyethylene glycol 3350 17 Gram(s) Oral daily  senna 2 Tablet(s) Oral at bedtime  sodium chloride 0.9%. 1000 milliLiter(s) (10 mL/Hr) IV Continuous <Continuous>    MEDICATIONS  (PRN):  acetaminophen     Tablet .. 650 milliGRAM(s) Oral every 6 hours PRN Mild Pain (1 - 3)  oxyCODONE    IR 5 milliGRAM(s) Oral every 4 hours PRN Moderate Pain (4 - 6)    CAPILLARY BLOOD GLUCOSE  POCT Blood Glucose.: 396 mg/dL (29 Sep 2022 21:15)  POCT Blood Glucose.: 319 mg/dL (29 Sep 2022 18:07)  POCT Blood Glucose.: 158 mg/dL (29 Sep 2022 12:27)  POCT Blood Glucose.: 112 mg/dL (29 Sep 2022 10:26)  POCT Blood Glucose.: 155 mg/dL (29 Sep 2022 08:57)  POCT Blood Glucose.: 186 mg/dL (29 Sep 2022 08:06)  POCT Blood Glucose.: 175 mg/dL (29 Sep 2022 07:03)  POCT Blood Glucose.: 170 mg/dL (29 Sep 2022 05:07)  POCT Blood Glucose.: 103 mg/dL (29 Sep 2022 03:06)  POCT Blood Glucose.: 131 mg/dL (29 Sep 2022 00:55)    ASSESSMENT / RECOMMENDATIONS  Mr. Bello is a 56 years old male with a past medical history of type 2 diabetes mellitus, hypertension, hyperlipidemia, peripheral arterial disease, chronic kidney disease, CVA (2013, 2014, 2015, with residual right sided weakness) who originally present to [facility] following an abnormal stress test. During workup patient had C showing pLAD 80% stenosis, Diagonal 1 70% stenosis, Diagonal 2 80% stenosis, Ramus 70% stenosis, OM1 90% stenosis, mRCA 85% stenosis, EF 60%. Patient was transferred to [facility] s/p CABG x 3 on 9/28/22 (LIMA-LAD, SVG-RCA, SVG-Ramus, EF 45%). Patient was on insulin drip after procedure, now transitioned to basa-bolus regimen with 5 units of NPH this morning. Endocrinology following for recommendations regarding diabetes management.     A1C: 7.6%  Weight: 75 kg  BMI: 27.3  Creatinine: 1.24  GFR: 68  Ejection Fraction: 45%    # Type 2 diabetes mellitus  - Please continue lantus 20 units at bedtime.   - Continue lispro 8 units before each meal.  - Continue lispro moderate dose sliding scale four times daily with meals and at bedtime.  - Patient's fingerstick glucose goal is 100-180 mg/dL.      Thank you for allowing us to participate in the care of Mr. Bello.   Will continue to monitor.       Case discussed with Dr. Chamorro. Primary team updated.       Gabriel Zhu    Endocrinology Fellow    Service Pager: [phone]

## 2022-09-29 NOTE — PROGRESS NOTE ADULT - SUBJECTIVE AND OBJECTIVE BOX
Patient discussed on morning rounds with Dr. Herrera    OPERATION & DATE: 9/28 CABG x 3 (LIMA-LAD, SVG-RCA, SVG-ramus)    SUBJECTIVE ASSESSMENT: Pt sitting up feeling well. Reports pain with deep inspiration and coughing but is in no distress. Pt tolerating PO and ambulating without difficulty.     VITAL SIGNS:  Vital Signs Last 24 Hrs  T(C): 36.7 (29 Sep 2022 13:59), Max: 36.9 (28 Sep 2022 21:33)  T(F): 98.1 (29 Sep 2022 13:59), Max: 98.5 (28 Sep 2022 21:33)  HR: 100 (29 Sep 2022 14:00) (84 - 104)  BP: 113/54 (29 Sep 2022 14:00) (97/54 - 116/56)  BP(mean): 78 (29 Sep 2022 14:00) (71 - 80)  RR: 16 (29 Sep 2022 14:00) (12 - 25)  SpO2: 99% (29 Sep 2022 14:00) (95% - 100%)    Parameters below as of 29 Sep 2022 15:00  Patient On (Oxygen Delivery Method): room air      I&O's Detail    28 Sep 2022 07:01  -  29 Sep 2022 07:00  --------------------------------------------------------  IN:    Albumin 5%  - 250 mL: 500 mL    Insulin: 55 mL    IV PiggyBack: 450 mL    Lactated Ringers Bolus: 500 mL    NiCARdipine: 25 mL    Oral Fluid: 480 mL    Propofol: 13 mL  Total IN: 2023 mL    OUT:    Chest Tube (mL): 390 mL    Chest Tube (mL): 150 mL    Indwelling Catheter - Urethral (mL): 1590 mL  Total OUT: 2130 mL    Total NET: -107 mL      29 Sep 2022 07:01  -  29 Sep 2022 15:16  --------------------------------------------------------  IN:    Albumin 5%  - 250 mL: 250 mL    Insulin: 10 mL  Total IN: 260 mL    OUT:    Chest Tube (mL): 30 mL    Indwelling Catheter - Urethral (mL): 375 mL  Total OUT: 405 mL    Total NET: -145 mL        CHEST TUBE:  L pleural tube in place upon arrival and removed  SILVIA DRAIN:  none  EPICARDIAL WIRES: AV wires in place  STITCHES: none  STAPLES: none  BERGMAN: none  CENTRAL LINE: R IJ  MIDLINE/PICC: none  WOUND VAC: none    PHYSICAL EXAM:  General: sitting in chair in NAD  Neurological: AOx3. Motor skills grossly intact  Cardiovascular: Normal S1/S2. Regular rate/rhythm. No murmurs  Respiratory: Lungs CTA bilaterally. No wheezing or rales  Gastrointestinal: +BS in all 4 quadrants. Non-distended. Soft. Non-tender  Extremities: Strength 5/5 b/l upper/lower extremities. Sensation grossly intact upper/lower extremities. No edema. No calf tenderness.  Vascular: Radial 2+bilaterally, DP 2+ b/l  Incision Sites:      LABS:                        9.8    11.01 )-----------( 120      ( 29 Sep 2022 12:49 )             28.8     PT/INR - ( 29 Sep 2022 03:16 )   PT: 13.9 sec;   INR: 1.17          PTT - ( 29 Sep 2022 03:16 )  PTT:29.4 sec  09-29    138  |  102  |  16  ----------------------------<  313<H>  4.7   |  20<L>  |  1.24    Ca    7.8<L>      29 Sep 2022 12:49  Phos  3.2     09-29  Mg     2.0     09-29    TPro  5.1<L>  /  Alb  3.1<L>  /  TBili  0.4  /  DBili  x   /  AST  62<H>  /  ALT  28  /  AlkPhos  37<L>  09-29      MEDICATIONS  (STANDING):  albumin human  5% IVPB 250 milliLiter(s) IV Intermittent every 30 minutes  aspirin enteric coated 81 milliGRAM(s) Oral daily  atorvastatin 40 milliGRAM(s) Oral at bedtime  ceFAZolin   IVPB 2000 milliGRAM(s) IV Intermittent every 8 hours  chlorhexidine 2% Cloths 1 Application(s) Topical daily  dextrose 50% Injectable 25 Gram(s) IV Push once  dextrose 50% Injectable 12.5 Gram(s) IV Push once  gabapentin 300 milliGRAM(s) Oral every 12 hours  heparin   Injectable 5000 Unit(s) SubCutaneous every 8 hours  insulin glargine Injectable (LANTUS) 10 Unit(s) SubCutaneous at bedtime  insulin lispro (ADMELOG) corrective regimen sliding scale   SubCutaneous Before meals and at bedtime  insulin lispro Injectable (ADMELOG) 3 Unit(s) SubCutaneous three times a day before meals  levothyroxine 50 MICROGram(s) Oral daily  niCARdipine Infusion 5 mG/Hr (25 mL/Hr) IV Continuous <Continuous>  pantoprazole    Tablet 40 milliGRAM(s) Oral before breakfast  polyethylene glycol 3350 17 Gram(s) Oral daily  senna 2 Tablet(s) Oral at bedtime  sodium chloride 0.9%. 1000 milliLiter(s) (10 mL/Hr) IV Continuous <Continuous>    MEDICATIONS  (PRN):  acetaminophen     Tablet .. 650 milliGRAM(s) Oral every 6 hours PRN Mild Pain (1 - 3)  oxyCODONE    IR 5 milliGRAM(s) Oral every 4 hours PRN Moderate Pain (4 - 6)  oxyCODONE    IR 10 milliGRAM(s) Oral every 6 hours PRN Severe Pain (7 - 10)    RADIOLOGY & ADDITIONAL TESTS:       Patient discussed on morning rounds with Dr. Herrera    OPERATION & DATE: 9/28 CABG x 3 (LIMA-LAD, SVG-RCA, SVG-ramus)    SUBJECTIVE ASSESSMENT: Pt sitting up feeling well. Reports pain with deep inspiration and coughing but is in no distress. Pt tolerating PO and ambulating without difficulty.     VITAL SIGNS:  Vital Signs Last 24 Hrs  T(C): 36.7 (29 Sep 2022 13:59), Max: 36.9 (28 Sep 2022 21:33)  T(F): 98.1 (29 Sep 2022 13:59), Max: 98.5 (28 Sep 2022 21:33)  HR: 100 (29 Sep 2022 14:00) (84 - 104)  BP: 113/54 (29 Sep 2022 14:00) (97/54 - 116/56)  BP(mean): 78 (29 Sep 2022 14:00) (71 - 80)  RR: 16 (29 Sep 2022 14:00) (12 - 25)  SpO2: 99% (29 Sep 2022 14:00) (95% - 100%)    Parameters below as of 29 Sep 2022 15:00  Patient On (Oxygen Delivery Method): room air      I&O's Detail    28 Sep 2022 07:01  -  29 Sep 2022 07:00  --------------------------------------------------------  IN:    Albumin 5%  - 250 mL: 500 mL    Insulin: 55 mL    IV PiggyBack: 450 mL    Lactated Ringers Bolus: 500 mL    NiCARdipine: 25 mL    Oral Fluid: 480 mL    Propofol: 13 mL  Total IN: 2023 mL    OUT:    Chest Tube (mL): 390 mL    Chest Tube (mL): 150 mL    Indwelling Catheter - Urethral (mL): 1590 mL  Total OUT: 2130 mL    Total NET: -107 mL      29 Sep 2022 07:01  -  29 Sep 2022 15:16  --------------------------------------------------------  IN:    Albumin 5%  - 250 mL: 250 mL    Insulin: 10 mL  Total IN: 260 mL    OUT:    Chest Tube (mL): 30 mL    Indwelling Catheter - Urethral (mL): 375 mL  Total OUT: 405 mL    Total NET: -145 mL        CHEST TUBE:  L pleural tube in place upon arrival and removed  SILVIA DRAIN:  none  EPICARDIAL WIRES: AV wires in place  STITCHES: none  STAPLES: none  BERGMAN: none  CENTRAL LINE: R IJ  MIDLINE/PICC: none  WOUND VAC: none    PHYSICAL EXAM:  General: sitting in chair in NAD  Neurological: AOx3. Motor skills grossly intact  Cardiovascular: Normal S1/S2. Regular rate/rhythm. No murmurs  Respiratory: Lungs CTA bilaterally. No wheezing or rales  Gastrointestinal: +BS in all 4 quadrants. Non-distended. Soft. Non-tender  Extremities: Strength 5/5 b/l upper/lower extremities. Sensation grossly intact upper/lower extremities. No edema. No calf tenderness.  Vascular: Radial 2+bilaterally, DP 2+ b/l  Incision Sites: Dressing over sternal wound, no surrounding erythema. LUE graft sites WNL, no signs of infection. RLE graft sites WNL, no signs of infection.       LABS:                        9.8    11.01 )-----------( 120      ( 29 Sep 2022 12:49 )             28.8     PT/INR - ( 29 Sep 2022 03:16 )   PT: 13.9 sec;   INR: 1.17          PTT - ( 29 Sep 2022 03:16 )  PTT:29.4 sec  09-29    138  |  102  |  16  ----------------------------<  313<H>  4.7   |  20<L>  |  1.24    Ca    7.8<L>      29 Sep 2022 12:49  Phos  3.2     09-29  Mg     2.0     09-29    TPro  5.1<L>  /  Alb  3.1<L>  /  TBili  0.4  /  DBili  x   /  AST  62<H>  /  ALT  28  /  AlkPhos  37<L>  09-29      MEDICATIONS  (STANDING):  albumin human  5% IVPB 250 milliLiter(s) IV Intermittent every 30 minutes  aspirin enteric coated 81 milliGRAM(s) Oral daily  atorvastatin 40 milliGRAM(s) Oral at bedtime  ceFAZolin   IVPB 2000 milliGRAM(s) IV Intermittent every 8 hours  chlorhexidine 2% Cloths 1 Application(s) Topical daily  dextrose 50% Injectable 25 Gram(s) IV Push once  dextrose 50% Injectable 12.5 Gram(s) IV Push once  gabapentin 300 milliGRAM(s) Oral every 12 hours  heparin   Injectable 5000 Unit(s) SubCutaneous every 8 hours  insulin glargine Injectable (LANTUS) 10 Unit(s) SubCutaneous at bedtime  insulin lispro (ADMELOG) corrective regimen sliding scale   SubCutaneous Before meals and at bedtime  insulin lispro Injectable (ADMELOG) 3 Unit(s) SubCutaneous three times a day before meals  levothyroxine 50 MICROGram(s) Oral daily  niCARdipine Infusion 5 mG/Hr (25 mL/Hr) IV Continuous <Continuous>  pantoprazole    Tablet 40 milliGRAM(s) Oral before breakfast  polyethylene glycol 3350 17 Gram(s) Oral daily  senna 2 Tablet(s) Oral at bedtime  sodium chloride 0.9%. 1000 milliLiter(s) (10 mL/Hr) IV Continuous <Continuous>    MEDICATIONS  (PRN):  acetaminophen     Tablet .. 650 milliGRAM(s) Oral every 6 hours PRN Mild Pain (1 - 3)  oxyCODONE    IR 5 milliGRAM(s) Oral every 4 hours PRN Moderate Pain (4 - 6)  oxyCODONE    IR 10 milliGRAM(s) Oral every 6 hours PRN Severe Pain (7 - 10)    RADIOLOGY & ADDITIONAL TESTS:    CXR: s/p L chest tube removal, no gross PTX or pleural effusion. Pending official read.

## 2022-09-29 NOTE — PROGRESS NOTE ADULT - ASSESSMENT
56y Male with PMHx of HTN, HLD, DM, PAD, CKD, CVA x3 (2013, 2014, 2015) with residual right sided weakness, who originally present to [facility] following an abnormal stress test, now awaiting CABG next week 2/2 multivessel CAD. Stroke team consulted due to patient's prior history of CVA and for preop neurological exam.    - continue ASA 81mg and Atorvastatin 10mg QD (LDL 59)  - monitor for new or worsening deficits postoperatively; q4h stroke neuro checks while on 9LA  - please call stroke team prior to D/C for outpatient stroke follow up appt for evaluation of hypercoagulable state, however, patient's risk factors most likely contributing to patient's strokes    Case discussed with Neurology Attending Dr. Laura Carlson and Dr. Cardenas

## 2022-09-29 NOTE — PHYSICAL THERAPY INITIAL EVALUATION ADULT - PERTINENT HX OF CURRENT PROBLEM, REHAB EVAL
Patient is a 56 year old male who originally present to [facility] following an abnormal stress test. During workup patient had Coronary Catheterization showing pLAD 80% stenosis, Diagonal 1 70% stenosis, Diagonal 2 80% stenosis, Ramus 70% stenosis, OM1 90% stenosis, mRCA 85% stenosis, EF 60%. Patient now transferred to [facility] under the care of Dr. Herrera, in setting of multi vessel CAD, for further evaluation and management.

## 2022-09-29 NOTE — PROGRESS NOTE ADULT - ATTENDING COMMENTS
Pt seen on rounds this afternoon and events of the weekend reviewed.  Pt is without any new complaints.  Ambulating in the hallway.  PO intake has been good  Glucoses are still elevated to the 200 range post-prandially, and the AM fingerstick was still high at 164  To increase the Lantus from 12 to 16 units, the premeal from 4 to 8 units
Pt seen on rounds this afternoon.  Underwent CABG X 3 yesterday.  Still in moderate incisional pain  Was on insulin drip at 3-5 U/hr overnight to this morning, Drip was discontinued at 10 AM with , and he was bridged with 5 units NPH  Noon FS was 158  PO intake seems to be poor   Appears mildly dyspneic, but lung exam (with decreased breath sounds at both bases) is hindered by poor inspiratory effort  --Although he only required 16 units Lantus pre-op, will increase this to 20 units for tonight  --Decrease his pre-op lispro dose from 14 to 8 units TID given his limited intake

## 2022-09-30 PROBLEM — E78.5 HYPERLIPIDEMIA, UNSPECIFIED: Chronic | Status: ACTIVE | Noted: 2022-09-22

## 2022-09-30 PROBLEM — N18.9 CHRONIC KIDNEY DISEASE, UNSPECIFIED: Chronic | Status: ACTIVE | Noted: 2022-09-22

## 2022-09-30 PROBLEM — E11.9 TYPE 2 DIABETES MELLITUS WITHOUT COMPLICATIONS: Chronic | Status: ACTIVE | Noted: 2022-09-22

## 2022-09-30 PROBLEM — I63.9 CEREBRAL INFARCTION, UNSPECIFIED: Chronic | Status: ACTIVE | Noted: 2022-09-22

## 2022-09-30 PROBLEM — I73.9 PERIPHERAL VASCULAR DISEASE, UNSPECIFIED: Chronic | Status: ACTIVE | Noted: 2022-09-22

## 2022-09-30 PROBLEM — I10 ESSENTIAL (PRIMARY) HYPERTENSION: Chronic | Status: ACTIVE | Noted: 2022-09-22

## 2022-09-30 LAB
ALBUMIN SERPL ELPH-MCNC: 3.3 G/DL — SIGNIFICANT CHANGE UP (ref 3.3–5)
ALP SERPL-CCNC: 40 U/L — SIGNIFICANT CHANGE UP (ref 40–120)
ALT FLD-CCNC: 16 U/L — SIGNIFICANT CHANGE UP (ref 10–45)
ANION GAP SERPL CALC-SCNC: 12 MMOL/L — SIGNIFICANT CHANGE UP (ref 5–17)
APTT BLD: 29.8 SEC — SIGNIFICANT CHANGE UP (ref 27.5–35.5)
AST SERPL-CCNC: 30 U/L — SIGNIFICANT CHANGE UP (ref 10–40)
BASE EXCESS BLDV CALC-SCNC: -7 MMOL/L — LOW (ref -2–3)
BILIRUB SERPL-MCNC: 0.4 MG/DL — SIGNIFICANT CHANGE UP (ref 0.2–1.2)
BUN SERPL-MCNC: 19 MG/DL — SIGNIFICANT CHANGE UP (ref 7–23)
CA-I SERPL-SCNC: 1.23 MMOL/L — SIGNIFICANT CHANGE UP (ref 1.15–1.33)
CALCIUM SERPL-MCNC: 8 MG/DL — LOW (ref 8.4–10.5)
CHLORIDE SERPL-SCNC: 102 MMOL/L — SIGNIFICANT CHANGE UP (ref 96–108)
CO2 BLDV-SCNC: 21.6 MMOL/L — LOW (ref 22–26)
CO2 SERPL-SCNC: 21 MMOL/L — LOW (ref 22–31)
CREAT SERPL-MCNC: 1.32 MG/DL — HIGH (ref 0.5–1.3)
EGFR: 63 ML/MIN/1.73M2 — SIGNIFICANT CHANGE UP
GAS PNL BLDV: 140 MMOL/L — SIGNIFICANT CHANGE UP (ref 136–145)
GAS PNL BLDV: SIGNIFICANT CHANGE UP
GLUCOSE BLDC GLUCOMTR-MCNC: 269 MG/DL — HIGH (ref 70–99)
GLUCOSE BLDC GLUCOMTR-MCNC: 278 MG/DL — HIGH (ref 70–99)
GLUCOSE BLDC GLUCOMTR-MCNC: 287 MG/DL — HIGH (ref 70–99)
GLUCOSE BLDC GLUCOMTR-MCNC: 311 MG/DL — HIGH (ref 70–99)
GLUCOSE SERPL-MCNC: 288 MG/DL — HIGH (ref 70–99)
HCO3 BLDV-SCNC: 20 MMOL/L — LOW (ref 22–29)
HCT VFR BLD CALC: 26.6 % — LOW (ref 39–50)
HCT VFR BLD CALC: 26.7 % — LOW (ref 39–50)
HGB BLD-MCNC: 8.6 G/DL — LOW (ref 13–17)
HGB BLD-MCNC: 8.8 G/DL — LOW (ref 13–17)
INR BLD: 1.17 — HIGH (ref 0.88–1.16)
MAGNESIUM SERPL-MCNC: 1.9 MG/DL — SIGNIFICANT CHANGE UP (ref 1.6–2.6)
MCHC RBC-ENTMCNC: 29.5 PG — SIGNIFICANT CHANGE UP (ref 27–34)
MCHC RBC-ENTMCNC: 29.5 PG — SIGNIFICANT CHANGE UP (ref 27–34)
MCHC RBC-ENTMCNC: 32.2 GM/DL — SIGNIFICANT CHANGE UP (ref 32–36)
MCHC RBC-ENTMCNC: 33.1 GM/DL — SIGNIFICANT CHANGE UP (ref 32–36)
MCV RBC AUTO: 89.3 FL — SIGNIFICANT CHANGE UP (ref 80–100)
MCV RBC AUTO: 91.4 FL — SIGNIFICANT CHANGE UP (ref 80–100)
NRBC # BLD: 0 /100 WBCS — SIGNIFICANT CHANGE UP (ref 0–0)
NRBC # BLD: 0 /100 WBCS — SIGNIFICANT CHANGE UP (ref 0–0)
PCO2 BLDV: 46 MMHG — SIGNIFICANT CHANGE UP (ref 42–55)
PH BLDV: 7.25 — LOW (ref 7.32–7.43)
PHOSPHATE SERPL-MCNC: 2 MG/DL — LOW (ref 2.5–4.5)
PLATELET # BLD AUTO: 121 K/UL — LOW (ref 150–400)
PLATELET # BLD AUTO: 123 K/UL — LOW (ref 150–400)
PO2 BLDV: 34 MMHG — SIGNIFICANT CHANGE UP (ref 25–45)
POTASSIUM BLDV-SCNC: 3.7 MMOL/L — SIGNIFICANT CHANGE UP (ref 3.5–5.1)
POTASSIUM SERPL-MCNC: 4.4 MMOL/L — SIGNIFICANT CHANGE UP (ref 3.5–5.3)
POTASSIUM SERPL-SCNC: 4.4 MMOL/L — SIGNIFICANT CHANGE UP (ref 3.5–5.3)
PROT SERPL-MCNC: 5.7 G/DL — LOW (ref 6–8.3)
PROTHROM AB SERPL-ACNC: 13.9 SEC — HIGH (ref 10.5–13.4)
RBC # BLD: 2.92 M/UL — LOW (ref 4.2–5.8)
RBC # BLD: 2.98 M/UL — LOW (ref 4.2–5.8)
RBC # FLD: 15.8 % — HIGH (ref 10.3–14.5)
RBC # FLD: 15.8 % — HIGH (ref 10.3–14.5)
SAO2 % BLDV: 52.6 % — LOW (ref 67–88)
SODIUM SERPL-SCNC: 135 MMOL/L — SIGNIFICANT CHANGE UP (ref 135–145)
WBC # BLD: 11.13 K/UL — HIGH (ref 3.8–10.5)
WBC # BLD: 11.47 K/UL — HIGH (ref 3.8–10.5)
WBC # FLD AUTO: 11.13 K/UL — HIGH (ref 3.8–10.5)
WBC # FLD AUTO: 11.47 K/UL — HIGH (ref 3.8–10.5)

## 2022-09-30 PROCEDURE — 71045 X-RAY EXAM CHEST 1 VIEW: CPT | Mod: 26

## 2022-09-30 PROCEDURE — 99231 SBSQ HOSP IP/OBS SF/LOW 25: CPT

## 2022-09-30 RX ORDER — METOPROLOL TARTRATE 50 MG
12.5 TABLET ORAL EVERY 6 HOURS
Refills: 0 | Status: DISCONTINUED | OUTPATIENT
Start: 2022-09-30 | End: 2022-09-30

## 2022-09-30 RX ORDER — METOPROLOL TARTRATE 50 MG
2.5 TABLET ORAL ONCE
Refills: 0 | Status: COMPLETED | OUTPATIENT
Start: 2022-09-30 | End: 2022-09-30

## 2022-09-30 RX ORDER — INSULIN LISPRO 100/ML
12 VIAL (ML) SUBCUTANEOUS
Refills: 0 | Status: DISCONTINUED | OUTPATIENT
Start: 2022-09-30 | End: 2022-09-30

## 2022-09-30 RX ORDER — METOPROLOL TARTRATE 50 MG
12.5 TABLET ORAL ONCE
Refills: 0 | Status: COMPLETED | OUTPATIENT
Start: 2022-09-30 | End: 2022-09-30

## 2022-09-30 RX ORDER — METFORMIN HYDROCHLORIDE 850 MG/1
500 TABLET ORAL EVERY 12 HOURS
Refills: 0 | Status: DISCONTINUED | OUTPATIENT
Start: 2022-09-30 | End: 2022-09-30

## 2022-09-30 RX ORDER — METOPROLOL TARTRATE 50 MG
25 TABLET ORAL EVERY 8 HOURS
Refills: 0 | Status: DISCONTINUED | OUTPATIENT
Start: 2022-09-30 | End: 2022-09-30

## 2022-09-30 RX ORDER — INSULIN LISPRO 100/ML
14 VIAL (ML) SUBCUTANEOUS
Refills: 0 | Status: DISCONTINUED | OUTPATIENT
Start: 2022-09-30 | End: 2022-10-01

## 2022-09-30 RX ORDER — METOPROLOL TARTRATE 50 MG
12.5 TABLET ORAL EVERY 8 HOURS
Refills: 0 | Status: DISCONTINUED | OUTPATIENT
Start: 2022-09-30 | End: 2022-09-30

## 2022-09-30 RX ORDER — INSULIN GLARGINE 100 [IU]/ML
30 INJECTION, SOLUTION SUBCUTANEOUS AT BEDTIME
Refills: 0 | Status: DISCONTINUED | OUTPATIENT
Start: 2022-09-30 | End: 2022-10-01

## 2022-09-30 RX ORDER — METOPROLOL TARTRATE 50 MG
25 TABLET ORAL EVERY 6 HOURS
Refills: 0 | Status: DISCONTINUED | OUTPATIENT
Start: 2022-09-30 | End: 2022-10-01

## 2022-09-30 RX ORDER — MAGNESIUM OXIDE 400 MG ORAL TABLET 241.3 MG
400 TABLET ORAL ONCE
Refills: 0 | Status: COMPLETED | OUTPATIENT
Start: 2022-09-30 | End: 2022-09-30

## 2022-09-30 RX ADMIN — ATORVASTATIN CALCIUM 40 MILLIGRAM(S): 80 TABLET, FILM COATED ORAL at 22:10

## 2022-09-30 RX ADMIN — Medication 8 UNIT(S): at 06:30

## 2022-09-30 RX ADMIN — Medication 81 MILLIGRAM(S): at 13:04

## 2022-09-30 RX ADMIN — OXYCODONE HYDROCHLORIDE 5 MILLIGRAM(S): 5 TABLET ORAL at 08:38

## 2022-09-30 RX ADMIN — Medication 14 UNIT(S): at 17:39

## 2022-09-30 RX ADMIN — INSULIN GLARGINE 30 UNIT(S): 100 INJECTION, SOLUTION SUBCUTANEOUS at 22:18

## 2022-09-30 RX ADMIN — Medication 50 MICROGRAM(S): at 05:31

## 2022-09-30 RX ADMIN — PANTOPRAZOLE SODIUM 40 MILLIGRAM(S): 20 TABLET, DELAYED RELEASE ORAL at 06:27

## 2022-09-30 RX ADMIN — Medication 25 MILLIGRAM(S): at 18:44

## 2022-09-30 RX ADMIN — HEPARIN SODIUM 5000 UNIT(S): 5000 INJECTION INTRAVENOUS; SUBCUTANEOUS at 05:31

## 2022-09-30 RX ADMIN — POLYETHYLENE GLYCOL 3350 17 GRAM(S): 17 POWDER, FOR SOLUTION ORAL at 13:05

## 2022-09-30 RX ADMIN — Medication 12.5 MILLIGRAM(S): at 16:19

## 2022-09-30 RX ADMIN — Medication 12 UNIT(S): at 12:54

## 2022-09-30 RX ADMIN — GABAPENTIN 300 MILLIGRAM(S): 400 CAPSULE ORAL at 05:31

## 2022-09-30 RX ADMIN — Medication 12.5 MILLIGRAM(S): at 14:48

## 2022-09-30 RX ADMIN — OXYCODONE HYDROCHLORIDE 5 MILLIGRAM(S): 5 TABLET ORAL at 22:10

## 2022-09-30 RX ADMIN — OXYCODONE HYDROCHLORIDE 5 MILLIGRAM(S): 5 TABLET ORAL at 22:19

## 2022-09-30 RX ADMIN — HEPARIN SODIUM 5000 UNIT(S): 5000 INJECTION INTRAVENOUS; SUBCUTANEOUS at 22:10

## 2022-09-30 RX ADMIN — Medication 650 MILLIGRAM(S): at 04:15

## 2022-09-30 RX ADMIN — GABAPENTIN 300 MILLIGRAM(S): 400 CAPSULE ORAL at 18:45

## 2022-09-30 RX ADMIN — Medication 12.5 MILLIGRAM(S): at 00:11

## 2022-09-30 RX ADMIN — Medication 650 MILLIGRAM(S): at 03:13

## 2022-09-30 RX ADMIN — Medication 2.5 MILLIGRAM(S): at 13:46

## 2022-09-30 RX ADMIN — Medication 8: at 12:53

## 2022-09-30 RX ADMIN — MAGNESIUM OXIDE 400 MG ORAL TABLET 400 MILLIGRAM(S): 241.3 TABLET ORAL at 13:04

## 2022-09-30 RX ADMIN — Medication 6: at 06:29

## 2022-09-30 RX ADMIN — HEPARIN SODIUM 5000 UNIT(S): 5000 INJECTION INTRAVENOUS; SUBCUTANEOUS at 14:47

## 2022-09-30 RX ADMIN — Medication 12.5 MILLIGRAM(S): at 06:27

## 2022-09-30 RX ADMIN — Medication 100 MILLIGRAM(S): at 05:30

## 2022-09-30 RX ADMIN — CHLORHEXIDINE GLUCONATE 1 APPLICATION(S): 213 SOLUTION TOPICAL at 12:59

## 2022-09-30 RX ADMIN — SENNA PLUS 2 TABLET(S): 8.6 TABLET ORAL at 22:10

## 2022-09-30 RX ADMIN — Medication 6: at 17:38

## 2022-09-30 RX ADMIN — METFORMIN HYDROCHLORIDE 500 MILLIGRAM(S): 850 TABLET ORAL at 07:19

## 2022-09-30 NOTE — PROGRESS NOTE ADULT - NS ATTEND AMEND GEN_ALL_CORE FT
Pt seen on rounds this afternoon.  Had no new complaints.  Appeared well.  Has been ambulating in the hallway  PO intake has been fairly good, but somewhat inconsistent in carb intake--we emphasized to his that he needs to order some type of starch with each meal  Glucoses have still been > 200 for the most part.  Will increase the premeal lispro to 12 units, but leave the Lanttus at 12 units since his AM FS was down to 153 today and he will be NPO for OR after MN tonight
Pt seen on rounds this afternoon.  Was OOB in a chair, breathing comfortably, but still in pain.  PO intake improved.  Lungs still with decreased breath sounds at the bases, (again difficult to evaluate due to poor inspiratory effort)  Glucoses markedly higher since late yesterday despite the 20 units Lantus at bedtime--270-320, with one spike to 396 mg%  Will increase the Lantus to 30 units, premeal to 14 units

## 2022-09-30 NOTE — PROGRESS NOTE ADULT - ASSESSMENT
Mr. Bello is a 56 years old male with a past medical history of type 2 diabetes mellitus, hypertension, hyperlipidemia, peripheral arterial disease, chronic kidney disease, CVA (2013, 2014, 2015, with residual right sided weakness) who originally present to [facility] following an abnormal stress test. During workup patient had LHC showing pLAD 80% stenosis, Diagonal 1 70% stenosis, Diagonal 2 80% stenosis, Ramus 70% stenosis, OM1 90% stenosis, mRCA 85% stenosis, EF 60%, now s/p CABG 9/28.    A1C: 7.6%  Weight: 75kg  Cr/GRF: 1.4/57  EF 55-60%

## 2022-09-30 NOTE — PROGRESS NOTE ADULT - PROBLEM SELECTOR PLAN 1
- Please continue lantus to  units at bedtime.   - Increase lispro to units before each meal.  - Continue lispro moderate dose sliding scale four times daily with meals and at bedtime.  - Patient's fingerstick glucose goal is 100-180 mg/dL.      Thank you for allowing us to participate in the care of Mr. Bello.  Will continue to monitor.       Case discussed with Dr. Chamorro. Primary team updated - Please increase lantus to 30 units at bedtime.   - Increase lispro to 14 units before each meal.  - He is also on metformin 500mg BID ordered by CTS.  - Continue lispro moderate dose sliding scale four times daily with meals and at bedtime.  - Patient's fingerstick glucose goal is 100-180 mg/dL.      Thank you for allowing us to participate in the care of Mr. Bello.  Will continue to monitor.       Case discussed with Dr. Chamorro. Primary team updated

## 2022-09-30 NOTE — PROGRESS NOTE ADULT - ASSESSMENT
57 yo M with PMHx of HTN, HLD, DM, PAD, CKD, CVA x3 (2013, 2014, 2015) with residual right sided weakness and aphasia, who originally presented to [facility] following an abnormal stress test. During workup patient had Coronary Catheterization showing pLAD 80% stenosis, Diagonal 1 70% stenosis, Diagonal 2 80% stenosis, Ramus 70% stenosis, OM1 90% stenosis, mRCA 85% stenosis, EF 60%. Patient transferred to [facility] under the care of Dr. Herrera, for CABG. On 9/28 he underwent a CABG x3 (LIMA-LAD, SVG-RCA, SVG-Ramus). He was recovered in the ICU and hemodynamically stable. POD1 he was downgraded to 9  lach with central line, jose luis, and L pleural tube. he began having in range of 100s/50s, pt given albumin with lasix with improvement. His L pleural tube was removed without incident. On POD 2 he became slightly tachycardic in the low 100s, and his Metoprolol was titrated up to 25 mg Q6H which he responded to. Endocrinology has been following and made corrections to his insulin regiment with a glucose goal of 100-180. His TLC and hughes were removed. He will remain for possible DC on Sunday.     Neurovascular:   -No delirium. Pain well controlled with current regimen.  -acetaminophen     Tablet .. 650 milliGRAM(s) every 6 hours PRN  -gabapentin 300 milliGRAM(s) every 12 hours  -oxyCODONE    IR 5 milliGRAM(s) every 4 hours PRN    Cardiovascular:   #POD3 CABGx3  -continue aspirin 81 mg QD  -continue statin for long term graft patency  -increased Metoprolol to 25 mg Q6H  HR: 112 (89 - 114)  BP: 123/58 (99/50 - 145/67)    Respiratory:   02 Sat = 98% on RA.  RR: 19 (13 - 19)  SpO2: 98% (94% - 100%)  -Wean to RA from for O2 Sat > 93%.  -Encourage Cough, deep breathing and Use of IS 10x / hr while awake.  -Chest PT 4xdaily    GI:   -Stable  -Continue pantoprazole    Tablet 40 milliGRAM(s) before breakfast  -polyethylene glycol 3350 17 Gram(s) daily  -senna 2 Tablet(s) at bedtime  -PO DASH diet    Renal / :   BUN/Cr Stable 19/1.32  -Continue to monitor I/O's.    Endocrine:    Blood sugar stable   -Endocrine following, increased lantus to 20 and Lispro to 12  -Continue on Metformin 500 mg Q12H   -continue moderate sliding scale   A1C: 7.6  #Hypothyroidism  -continue Levothyroxine 50 mcg QD  TSH: 2.360    Hematologic:  H/H stable 8.6/26.7  -DVT prophylaxis with Heparin sq    ID:  -Afebrile  -Continue to observe for SIRS/Sepsis Syndrome.  T(C): 37.4, Max: 37.7 (09-30-22 @ 05:01)    Disposition:  -Discharge when medically ready  55 yo M with PMHx of HTN, HLD, DM, PAD, CKD, CVA x3 (2013, 2014, 2015) with residual right sided weakness and aphasia, who originally presented to [facility] following an abnormal stress test. During workup patient had Coronary Catheterization showing pLAD 80% stenosis, Diagonal 1 70% stenosis, Diagonal 2 80% stenosis, Ramus 70% stenosis, OM1 90% stenosis, mRCA 85% stenosis, EF 60%. Patient transferred to [facility] under the care of Dr. Herrera, for CABG. On 9/28 he underwent a CABG x3 (LIMA-LAD, SVG-RCA, SVG-Ramus). He was recovered in the ICU and hemodynamically stable. POD1 he was downgraded to 9  lach with central line, jose luis, and L pleural tube. he began having in range of 100s/50s, pt given albumin with lasix with improvement. His L pleural tube was removed without incident. On POD 2 he became slightly tachycardic in the low 100s, and his Metoprolol was titrated up to 25 mg Q6H which he responded to. Endocrinology has been following and made corrections to his insulin regiment with a glucose goal of 100-180. His TLC and hughes were removed. He will remain for possible DC on Sunday.     Neurovascular:   -No delirium. Pain well controlled with current regimen.  -acetaminophen     Tablet .. 650 milliGRAM(s) every 6 hours PRN  -gabapentin 300 milliGRAM(s) every 12 hours  -oxyCODONE    IR 5 milliGRAM(s) every 4 hours PRN    Cardiovascular:   #POD3 CABGx3  -continue aspirin 81 mg QD  -continue statin for long term graft patency  -increased Metoprolol to 25 mg Q6H  HR: 112 (89 - 114)  BP: 123/58 (99/50 - 145/67)    Respiratory:   02 Sat = 98% on RA.  RR: 19 (13 - 19)  SpO2: 98% (94% - 100%)  -Wean to RA from for O2 Sat > 93%.  -Encourage Cough, deep breathing and Use of IS 10x / hr while awake.  -Chest PT 4xdaily    GI:   -Stable  -Continue pantoprazole    Tablet 40 milliGRAM(s) before breakfast  -polyethylene glycol 3350 17 Gram(s) daily  -senna 2 Tablet(s) at bedtime  -PO DASH diet    Renal / :   BUN/Cr Stable 19/1.32  -Continue to monitor I/O's.    Endocrine:    Blood sugar stable   -Endocrine following, increased lantus to 30 and Lispro to 14  -Continue on Metformin 500 mg Q12H   -continue moderate sliding scale   A1C: 7.6  #Hypothyroidism  -continue Levothyroxine 50 mcg QD  TSH: 2.360    Hematologic:  H/H stable 8.6/26.7  -DVT prophylaxis with Heparin sq    ID:  -Afebrile  -Continue to observe for SIRS/Sepsis Syndrome.  T(C): 37.4, Max: 37.7 (09-30-22 @ 05:01)    Disposition:  -Discharge when medically ready  57 yo M with PMHx of HTN, HLD, DM, PAD, CKD, CVA x3 (2013, 2014, 2015) with residual right sided weakness and aphasia, who originally presented to [facility] following an abnormal stress test. During workup patient had Coronary Catheterization showing pLAD 80% stenosis, Diagonal 1 70% stenosis, Diagonal 2 80% stenosis, Ramus 70% stenosis, OM1 90% stenosis, mRCA 85% stenosis, EF 60%. Patient transferred to [facility] under the care of Dr. Herrera, for CABG. On 9/28 he underwent a CABG x3 (LIMA-LAD, SVG-RCA, SVG-Ramus). He was recovered in the ICU and hemodynamically stable. POD1 he was downgraded to 9  lach with central line, jose luis, and L pleural tube. he began having in range of 100s/50s, pt given albumin with lasix with improvement. His L pleural tube was removed without incident. On POD 2 he became slightly tachycardic in the low 100s, and his Metoprolol was titrated up to 25 mg Q6H which he responded to. Endocrinology has been following and made corrections to his insulin regiment with a glucose goal of 100-180. His TLC and hughes were removed. He will remain for possible DC on Sunday.     Neurovascular:   #Hx CVA   -neuro signed off, they were consulted for baseline neuro exam pre op. They stated he is at baseline post op and signed off   -No delirium. Pain well controlled with current regimen.  -acetaminophen     Tablet .. 650 milliGRAM(s) every 6 hours PRN  -gabapentin 300 milliGRAM(s) every 12 hours  -oxyCODONE    IR 5 milliGRAM(s) every 4 hours PRN    Cardiovascular:   #POD2 CABGx3  -continue aspirin 81 mg QD  -continue statin for long term graft patency  -increased Metoprolol to 25 mg Q6H  HR: 112 (89 - 114)  BP: 123/58 (99/50 - 145/67)    Respiratory:   02 Sat = 98% on RA.  RR: 19 (13 - 19)  SpO2: 98% (94% - 100%)  -Wean to RA from for O2 Sat > 93%.  -Encourage Cough, deep breathing and Use of IS 10x / hr while awake.  -Chest PT 4xdaily    GI:   -Stable  -Continue pantoprazole    Tablet 40 milliGRAM(s) before breakfast  -polyethylene glycol 3350 17 Gram(s) daily  -senna 2 Tablet(s) at bedtime  -PO DASH diet    Renal / :   BUN/Cr Stable 19/1.32  -Continue to monitor I/O's.    Endocrine:    Blood sugar stable   -Endocrine following, increased lantus to 30 and Lispro to 14  -Continue on Metformin 500 mg Q12H   -continue moderate sliding scale   A1C: 7.6  #Hypothyroidism  -continue Levothyroxine 50 mcg QD  TSH: 2.360    Hematologic:  H/H stable 8.6/26.7  -DVT prophylaxis with Heparin sq    ID:  -Afebrile  -Continue to observe for SIRS/Sepsis Syndrome.  T(C): 37.4, Max: 37.7 (09-30-22 @ 05:01)    Disposition:  -Discharge when medically ready

## 2022-09-30 NOTE — PROGRESS NOTE ADULT - SUBJECTIVE AND OBJECTIVE BOX
Patient discussed on morning rounds with Dr. Herrera     Operation / Date: CABGx3 LIMA to LAD, SVG to RCA, SVG to Ramus EF 45% on 9/28    SUBJECTIVE ASSESSMENT:  56y Male seen and examined at bedside with no complaint. He stated that he feels well but has been coughing up mucus. Educated the pt about how to hug his heart pillow during coughing. Pt denies chest pain or pressure, fevers, chills, sweats, HA, nausea, vomiting, SOB/MCDOWELL or any further associated symptoms.     Vital Signs Last 24 Hrs  T(C): 37.4 (30 Sep 2022 14:35), Max: 37.7 (30 Sep 2022 05:01)  T(F): 99.3 (30 Sep 2022 14:35), Max: 99.8 (30 Sep 2022 05:01)  HR: 112 (30 Sep 2022 13:05) (89 - 114)  BP: 123/58 (30 Sep 2022 13:05) (99/50 - 145/67)  BP(mean): 84 (30 Sep 2022 13:05) (70 - 96)  RR: 19 (30 Sep 2022 13:05) (13 - 19)  SpO2: 98% (30 Sep 2022 13:05) (94% - 100%)    Parameters below as of 30 Sep 2022 13:05  Patient On (Oxygen Delivery Method): room air    I&O's Detail    29 Sep 2022 07:01  -  30 Sep 2022 07:00  --------------------------------------------------------  IN:    Albumin 5%  - 250 mL: 500 mL    Insulin: 10 mL    IV PiggyBack: 100 mL    Oral Fluid: 350 mL    sodium chloride 0.9%: 120 mL  Total IN: 1080 mL    OUT:    Chest Tube (mL): 30 mL    Indwelling Catheter - Urethral (mL): 2255 mL  Total OUT: 2285 mL    Total NET: -1205 mL      30 Sep 2022 07:01  -  30 Sep 2022 17:03  --------------------------------------------------------  IN:    sodium chloride 0.9%: 10 mL  Total IN: 10 mL    OUT:    Indwelling Catheter - Urethral (mL): 175 mL  Total OUT: 175 mL    Total NET: -165 mL    CHEST TUBE:  None   SILVIA DRAIN: None   EPICARDIAL WIRES: Yes   TIE DOWNS: Yes   BERGMAN: None     PHYSICAL EXAM:  GEN: NAD, looks comfortable  Psych: Mood appropriate  Neuro: A&Ox3.  No focal deficits.  Moving all extremities.   HEENT: No obvious abnormalities  CV: S1S2, regular, no murmurs appreciated.  No carotid bruits.  No JVD  Lungs: Clear B/L.  No wheezing, rales or rhonchi  ABD: Soft, non-tender, non-distended.  +Bowel sounds  EXT: Warm and well perfused.  No peripheral edema noted  Musculoskeletal: Moving all extremities with normal ROM, no joint swelling  PV: Pedal pulses palpable  Incisions: MSI is clean dry and intact with no discharge or bleeding, drain sites are clean and dry with a dressing covering them, PW in place.     LABS:                        8.6    11.47 )-----------( 123      ( 30 Sep 2022 10:00 )             26.7       PT/INR - ( 30 Sep 2022 03:49 )   PT: 13.9 sec;   INR: 1.17          PTT - ( 30 Sep 2022 03:49 )  PTT:29.8 sec    09-30    135  |  102  |  19  ----------------------------<  288<H>  4.4   |  21<L>  |  1.32<H>    Ca    8.0<L>      30 Sep 2022 03:49  Phos  2.0     09-30  Mg     1.9     09-30    TPro  5.7<L>  /  Alb  3.3  /  TBili  0.4  /  DBili  x   /  AST  30  /  ALT  16  /  AlkPhos  40  09-30      MEDICATIONS  (STANDING):  aspirin enteric coated 81 milliGRAM(s) Oral daily  atorvastatin 40 milliGRAM(s) Oral at bedtime  chlorhexidine 2% Cloths 1 Application(s) Topical daily  dextrose 50% Injectable 25 Gram(s) IV Push once  dextrose 50% Injectable 12.5 Gram(s) IV Push once  gabapentin 300 milliGRAM(s) Oral every 12 hours  heparin   Injectable 5000 Unit(s) SubCutaneous every 8 hours  insulin glargine Injectable (LANTUS) 20 Unit(s) SubCutaneous at bedtime  insulin lispro (ADMELOG) corrective regimen sliding scale   SubCutaneous Before meals and at bedtime  insulin lispro Injectable (ADMELOG) 12 Unit(s) SubCutaneous three times a day before meals  levothyroxine 50 MICROGram(s) Oral daily  metFORMIN 500 milliGRAM(s) Oral every 12 hours  metoprolol tartrate 25 milliGRAM(s) Oral every 6 hours  pantoprazole    Tablet 40 milliGRAM(s) Oral before breakfast  polyethylene glycol 3350 17 Gram(s) Oral daily  senna 2 Tablet(s) Oral at bedtime  sodium chloride 0.9%. 1000 milliLiter(s) (10 mL/Hr) IV Continuous <Continuous>    MEDICATIONS  (PRN):  acetaminophen     Tablet .. 650 milliGRAM(s) Oral every 6 hours PRN Mild Pain (1 - 3)  oxyCODONE    IR 5 milliGRAM(s) Oral every 4 hours PRN Moderate Pain (4 - 6)      RADIOLOGY & ADDITIONAL TESTS:  < from: Xray Chest 1 View- PORTABLE-Routine (Xray Chest 1 View- PORTABLE-Routine in AM.) (09.30.22 @ 05:21) >    Findings/  impression: Left apical tiny pneumothorax. Support device in satisfactory   position. Stable cardiomegaly, thoracic aortic calcification, status post   median sternotomy, CABG. Right basilar opacity, increased. Left basilar   opacity, unchanged.    --- End of Report ---    < end of copied text >     Patient discussed on morning rounds with Dr. Herrera     Operation / Date: CABGx3 LIMA to LAD, SVG to RCA, SVG to Ramus EF 45% on 9/28    SUBJECTIVE ASSESSMENT:  56y Male seen and examined at bedside with no complaint. He stated that he feels well but has been coughing up mucus. Educated the pt about how to hug his heart pillow during coughing. Pt denies chest pain or pressure, fevers, chills, sweats, HA, nausea, vomiting, SOB/MCDOWELL or any further associated symptoms.     Vital Signs Last 24 Hrs  T(C): 37.4 (30 Sep 2022 14:35), Max: 37.7 (30 Sep 2022 05:01)  T(F): 99.3 (30 Sep 2022 14:35), Max: 99.8 (30 Sep 2022 05:01)  HR: 112 (30 Sep 2022 13:05) (89 - 114)  BP: 123/58 (30 Sep 2022 13:05) (99/50 - 145/67)  BP(mean): 84 (30 Sep 2022 13:05) (70 - 96)  RR: 19 (30 Sep 2022 13:05) (13 - 19)  SpO2: 98% (30 Sep 2022 13:05) (94% - 100%)    Parameters below as of 30 Sep 2022 13:05  Patient On (Oxygen Delivery Method): room air    I&O's Detail    29 Sep 2022 07:01  -  30 Sep 2022 07:00  --------------------------------------------------------  IN:    Albumin 5%  - 250 mL: 500 mL    Insulin: 10 mL    IV PiggyBack: 100 mL    Oral Fluid: 350 mL    sodium chloride 0.9%: 120 mL  Total IN: 1080 mL    OUT:    Chest Tube (mL): 30 mL    Indwelling Catheter - Urethral (mL): 2255 mL  Total OUT: 2285 mL    Total NET: -1205 mL      30 Sep 2022 07:01  -  30 Sep 2022 17:03  --------------------------------------------------------  IN:    sodium chloride 0.9%: 10 mL  Total IN: 10 mL    OUT:    Indwelling Catheter - Urethral (mL): 175 mL  Total OUT: 175 mL    Total NET: -165 mL    CHEST TUBE:  None   SILVIA DRAIN: None   EPICARDIAL WIRES: Yes   TIE DOWNS: Yes   BERGMAN: None     PHYSICAL EXAM:  GEN: NAD, looks comfortable  Psych: Mood appropriate  Neuro: A&Ox3. previous CVA deficits of a mild left facial and slight aphasia appreciated which is his baseline, Moving all extremities.   Moving all extremities.   HEENT: No obvious abnormalities  CV: S1S2, regular, no murmurs appreciated.  No carotid bruits.  No JVD  Lungs: Clear B/L.  No wheezing, rales or rhonchi  ABD: Soft, non-tender, non-distended.  +Bowel sounds  EXT: Warm and well perfused.  No peripheral edema noted  Musculoskeletal: Moving all extremities with normal ROM, no joint swelling  PV: Pedal pulses palpable  Incisions: MSI is clean dry and intact with no discharge or bleeding, drain sites are clean and dry with a dressing covering them, PW in place.     LABS:                        8.6    11.47 )-----------( 123      ( 30 Sep 2022 10:00 )             26.7       PT/INR - ( 30 Sep 2022 03:49 )   PT: 13.9 sec;   INR: 1.17          PTT - ( 30 Sep 2022 03:49 )  PTT:29.8 sec    09-30    135  |  102  |  19  ----------------------------<  288<H>  4.4   |  21<L>  |  1.32<H>    Ca    8.0<L>      30 Sep 2022 03:49  Phos  2.0     09-30  Mg     1.9     09-30    TPro  5.7<L>  /  Alb  3.3  /  TBili  0.4  /  DBili  x   /  AST  30  /  ALT  16  /  AlkPhos  40  09-30      MEDICATIONS  (STANDING):  aspirin enteric coated 81 milliGRAM(s) Oral daily  atorvastatin 40 milliGRAM(s) Oral at bedtime  chlorhexidine 2% Cloths 1 Application(s) Topical daily  dextrose 50% Injectable 25 Gram(s) IV Push once  dextrose 50% Injectable 12.5 Gram(s) IV Push once  gabapentin 300 milliGRAM(s) Oral every 12 hours  heparin   Injectable 5000 Unit(s) SubCutaneous every 8 hours  insulin glargine Injectable (LANTUS) 20 Unit(s) SubCutaneous at bedtime  insulin lispro (ADMELOG) corrective regimen sliding scale   SubCutaneous Before meals and at bedtime  insulin lispro Injectable (ADMELOG) 12 Unit(s) SubCutaneous three times a day before meals  levothyroxine 50 MICROGram(s) Oral daily  metFORMIN 500 milliGRAM(s) Oral every 12 hours  metoprolol tartrate 25 milliGRAM(s) Oral every 6 hours  pantoprazole    Tablet 40 milliGRAM(s) Oral before breakfast  polyethylene glycol 3350 17 Gram(s) Oral daily  senna 2 Tablet(s) Oral at bedtime  sodium chloride 0.9%. 1000 milliLiter(s) (10 mL/Hr) IV Continuous <Continuous>    MEDICATIONS  (PRN):  acetaminophen     Tablet .. 650 milliGRAM(s) Oral every 6 hours PRN Mild Pain (1 - 3)  oxyCODONE    IR 5 milliGRAM(s) Oral every 4 hours PRN Moderate Pain (4 - 6)      RADIOLOGY & ADDITIONAL TESTS:  < from: Xray Chest 1 View- PORTABLE-Routine (Xray Chest 1 View- PORTABLE-Routine in AM.) (09.30.22 @ 05:21) >    Findings/  impression: Left apical tiny pneumothorax. Support device in satisfactory   position. Stable cardiomegaly, thoracic aortic calcification, status post   median sternotomy, CABG. Right basilar opacity, increased. Left basilar   opacity, unchanged.    --- End of Report ---    < end of copied text >

## 2022-09-30 NOTE — PROGRESS NOTE ADULT - SUBJECTIVE AND OBJECTIVE BOX
INTERVAL HPI/OVERNIGHT EVENTS:    S/P CABG 8/28. Reports surgical site pain, especially when coughing. Appetite is moderate.    FSG & insulin:  Yesterday:  Dinner FSG  319  Lispro  8+8  Ate chicken, 1 bread, pudding, jello.  Bedtime    Lantus 20   lispro 10  Today:  Breakfast    Lispro  8+6 Ate cornflakes and milk, bread and coffee.  Lunch      Pt reports the following symptoms:    CONSTITUTIONAL:  Negative fever or chills  EYES:  Negative  blurry vision or double vision  CARDIOVASCULAR:  Negative for chest pain or palpitations  RESPIRATORY:  Negative for cough, wheezing, or SOB   GASTROINTESTINAL:  Negative for nausea, vomiting, diarrhea, constipation, or abdominal pain  GENITOURINARY:  Negative frequency, urgency or dysuria  NEUROLOGIC:  No headache, confusion, dizziness, lightheadedness    MEDICATIONS  (STANDING):  aspirin enteric coated 81 milliGRAM(s) Oral daily  atorvastatin 40 milliGRAM(s) Oral at bedtime  chlorhexidine 2% Cloths 1 Application(s) Topical daily  dextrose 50% Injectable 25 Gram(s) IV Push once  dextrose 50% Injectable 12.5 Gram(s) IV Push once  gabapentin 300 milliGRAM(s) Oral every 12 hours  heparin   Injectable 5000 Unit(s) SubCutaneous every 8 hours  insulin glargine Injectable (LANTUS) 20 Unit(s) SubCutaneous at bedtime  insulin lispro (ADMELOG) corrective regimen sliding scale   SubCutaneous Before meals and at bedtime  insulin lispro Injectable (ADMELOG) 12 Unit(s) SubCutaneous three times a day before meals  levothyroxine 50 MICROGram(s) Oral daily  metFORMIN 500 milliGRAM(s) Oral every 12 hours  metoprolol tartrate 12.5 milliGRAM(s) Oral every 6 hours  pantoprazole    Tablet 40 milliGRAM(s) Oral before breakfast  polyethylene glycol 3350 17 Gram(s) Oral daily  senna 2 Tablet(s) Oral at bedtime  sodium chloride 0.9%. 1000 milliLiter(s) (10 mL/Hr) IV Continuous <Continuous>    MEDICATIONS  (PRN):  acetaminophen     Tablet .. 650 milliGRAM(s) Oral every 6 hours PRN Mild Pain (1 - 3)  oxyCODONE    IR 5 milliGRAM(s) Oral every 4 hours PRN Moderate Pain (4 - 6)      PHYSICAL EXAM  Vital Signs Last 24 Hrs  T(C): 37.1 (30 Sep 2022 09:39), Max: 37.7 (30 Sep 2022 05:01)  T(F): 98.8 (30 Sep 2022 09:39), Max: 99.8 (30 Sep 2022 05:01)  HR: 112 (30 Sep 2022 13:05) (89 - 114)  BP: 123/58 (30 Sep 2022 13:05) (99/50 - 145/67)  BP(mean): 84 (30 Sep 2022 13:05) (70 - 99)  RR: 19 (30 Sep 2022 13:05) (13 - 19)  SpO2: 98% (30 Sep 2022 13:05) (94% - 100%)    Parameters below as of 30 Sep 2022 13:05  Patient On (Oxygen Delivery Method): room air    Constitutional: NAD.   HEENT: NCAT, MMM, OP clear, EOMI, no proptosis or lid retraction  Neck: no thyromegaly or palpable thyroid nodules   Respiratory: lungs CTAB.  Cardiovascular: regular rhythm, normal S1 and S2, no audible murmurs, no peripheral edema  GI: soft, NT/ND, no masses/HSM appreciated.  Neurology: no tremors, DTR 2+  Skin: no visible rashes/lesions.   Psychiatric: AAO x 3, normal affect/mood.    LABS:                        8.6    11.47 )-----------( 123      ( 30 Sep 2022 10:00 )             26.7     09-30    135  |  102  |  19  ----------------------------<  288<H>  4.4   |  21<L>  |  1.32<H>    Ca    8.0<L>      30 Sep 2022 03:49  Phos  2.0     09-30  Mg     1.9     09-30    TPro  5.7<L>  /  Alb  3.3  /  TBili  0.4  /  DBili  x   /  AST  30  /  ALT  16  /  AlkPhos  40  09-30    PT/INR - ( 30 Sep 2022 03:49 )   PT: 13.9 sec;   INR: 1.17          PTT - ( 30 Sep 2022 03:49 )  PTT:29.8 sec    Thyroid Stimulating Hormone, Serum: 2.360 uIU/mL (09-22 @ 21:26)      HbA1C:   CAPILLARY BLOOD GLUCOSE      POCT Blood Glucose.: 311 mg/dL (30 Sep 2022 11:36)  POCT Blood Glucose.: 269 mg/dL (30 Sep 2022 06:14)  POCT Blood Glucose.: 396 mg/dL (29 Sep 2022 21:15)  POCT Blood Glucose.: 319 mg/dL (29 Sep 2022 18:07)

## 2022-09-30 NOTE — PROGRESS NOTE ADULT - NS ATTEST RISK PROBLEM GEN_ALL_CORE FT
Hyperglycemia post CABG
Hyperglycemia in pt pre-op for CABG
Uncontrolled hyperglycemia post CABG
Inadequate glycemic control pre-CABG

## 2022-10-01 LAB
ANION GAP SERPL CALC-SCNC: 9 MMOL/L — SIGNIFICANT CHANGE UP (ref 5–17)
APTT BLD: 30.4 SEC — SIGNIFICANT CHANGE UP (ref 27.5–35.5)
BUN SERPL-MCNC: 21 MG/DL — SIGNIFICANT CHANGE UP (ref 7–23)
CALCIUM SERPL-MCNC: 8.1 MG/DL — LOW (ref 8.4–10.5)
CHLORIDE SERPL-SCNC: 101 MMOL/L — SIGNIFICANT CHANGE UP (ref 96–108)
CO2 SERPL-SCNC: 23 MMOL/L — SIGNIFICANT CHANGE UP (ref 22–31)
CREAT SERPL-MCNC: 1.23 MG/DL — SIGNIFICANT CHANGE UP (ref 0.5–1.3)
EGFR: 69 ML/MIN/1.73M2 — SIGNIFICANT CHANGE UP
GLUCOSE BLDC GLUCOMTR-MCNC: 182 MG/DL — HIGH (ref 70–99)
GLUCOSE BLDC GLUCOMTR-MCNC: 205 MG/DL — HIGH (ref 70–99)
GLUCOSE BLDC GLUCOMTR-MCNC: 206 MG/DL — HIGH (ref 70–99)
GLUCOSE BLDC GLUCOMTR-MCNC: 298 MG/DL — HIGH (ref 70–99)
GLUCOSE SERPL-MCNC: 188 MG/DL — HIGH (ref 70–99)
HCT VFR BLD CALC: 25.2 % — LOW (ref 39–50)
HCT VFR BLD CALC: 26.4 % — LOW (ref 39–50)
HGB BLD-MCNC: 8.2 G/DL — LOW (ref 13–17)
HGB BLD-MCNC: 8.5 G/DL — LOW (ref 13–17)
INR BLD: 1.09 — SIGNIFICANT CHANGE UP (ref 0.88–1.16)
MAGNESIUM SERPL-MCNC: 2 MG/DL — SIGNIFICANT CHANGE UP (ref 1.6–2.6)
MCHC RBC-ENTMCNC: 29.5 PG — SIGNIFICANT CHANGE UP (ref 27–34)
MCHC RBC-ENTMCNC: 29.7 PG — SIGNIFICANT CHANGE UP (ref 27–34)
MCHC RBC-ENTMCNC: 32.2 GM/DL — SIGNIFICANT CHANGE UP (ref 32–36)
MCHC RBC-ENTMCNC: 32.5 GM/DL — SIGNIFICANT CHANGE UP (ref 32–36)
MCV RBC AUTO: 91.3 FL — SIGNIFICANT CHANGE UP (ref 80–100)
MCV RBC AUTO: 91.7 FL — SIGNIFICANT CHANGE UP (ref 80–100)
NRBC # BLD: 0 /100 WBCS — SIGNIFICANT CHANGE UP (ref 0–0)
NRBC # BLD: 0 /100 WBCS — SIGNIFICANT CHANGE UP (ref 0–0)
PLATELET # BLD AUTO: 149 K/UL — LOW (ref 150–400)
PLATELET # BLD AUTO: 74 K/UL — LOW (ref 150–400)
POTASSIUM SERPL-MCNC: 4.9 MMOL/L — SIGNIFICANT CHANGE UP (ref 3.5–5.3)
POTASSIUM SERPL-SCNC: 4.9 MMOL/L — SIGNIFICANT CHANGE UP (ref 3.5–5.3)
PROTHROM AB SERPL-ACNC: 13 SEC — SIGNIFICANT CHANGE UP (ref 10.5–13.4)
RBC # BLD: 2.76 M/UL — LOW (ref 4.2–5.8)
RBC # BLD: 2.88 M/UL — LOW (ref 4.2–5.8)
RBC # FLD: 15.6 % — HIGH (ref 10.3–14.5)
RBC # FLD: 15.9 % — HIGH (ref 10.3–14.5)
SODIUM SERPL-SCNC: 133 MMOL/L — LOW (ref 135–145)
WBC # BLD: 10.66 K/UL — HIGH (ref 3.8–10.5)
WBC # BLD: 10.93 K/UL — HIGH (ref 3.8–10.5)
WBC # FLD AUTO: 10.66 K/UL — HIGH (ref 3.8–10.5)
WBC # FLD AUTO: 10.93 K/UL — HIGH (ref 3.8–10.5)

## 2022-10-01 PROCEDURE — 71045 X-RAY EXAM CHEST 1 VIEW: CPT | Mod: 26,76

## 2022-10-01 PROCEDURE — 32557 INSERT CATH PLEURA W/ IMAGE: CPT

## 2022-10-01 PROCEDURE — 99231 SBSQ HOSP IP/OBS SF/LOW 25: CPT

## 2022-10-01 RX ORDER — METOPROLOL TARTRATE 50 MG
25 TABLET ORAL ONCE
Refills: 0 | Status: COMPLETED | OUTPATIENT
Start: 2022-10-02 | End: 2022-10-02

## 2022-10-01 RX ORDER — METOPROLOL TARTRATE 50 MG
100 TABLET ORAL DAILY
Refills: 0 | Status: DISCONTINUED | OUTPATIENT
Start: 2022-10-02 | End: 2022-10-02

## 2022-10-01 RX ORDER — INSULIN LISPRO 100/ML
17 VIAL (ML) SUBCUTANEOUS
Refills: 0 | Status: DISCONTINUED | OUTPATIENT
Start: 2022-10-01 | End: 2022-10-02

## 2022-10-01 RX ORDER — METOPROLOL TARTRATE 50 MG
25 TABLET ORAL ONCE
Refills: 0 | Status: COMPLETED | OUTPATIENT
Start: 2022-10-01 | End: 2022-10-01

## 2022-10-01 RX ORDER — INSULIN GLARGINE 100 [IU]/ML
35 INJECTION, SOLUTION SUBCUTANEOUS AT BEDTIME
Refills: 0 | Status: DISCONTINUED | OUTPATIENT
Start: 2022-10-01 | End: 2022-10-02

## 2022-10-01 RX ADMIN — Medication 25 MILLIGRAM(S): at 21:00

## 2022-10-01 RX ADMIN — Medication 50 MICROGRAM(S): at 05:52

## 2022-10-01 RX ADMIN — Medication 6: at 12:00

## 2022-10-01 RX ADMIN — Medication 14 UNIT(S): at 12:00

## 2022-10-01 RX ADMIN — OXYCODONE HYDROCHLORIDE 5 MILLIGRAM(S): 5 TABLET ORAL at 11:30

## 2022-10-01 RX ADMIN — Medication 17 UNIT(S): at 18:27

## 2022-10-01 RX ADMIN — Medication 25 MILLIGRAM(S): at 06:03

## 2022-10-01 RX ADMIN — CHLORHEXIDINE GLUCONATE 1 APPLICATION(S): 213 SOLUTION TOPICAL at 11:57

## 2022-10-01 RX ADMIN — OXYCODONE HYDROCHLORIDE 5 MILLIGRAM(S): 5 TABLET ORAL at 22:19

## 2022-10-01 RX ADMIN — POLYETHYLENE GLYCOL 3350 17 GRAM(S): 17 POWDER, FOR SOLUTION ORAL at 11:57

## 2022-10-01 RX ADMIN — Medication 81 MILLIGRAM(S): at 11:56

## 2022-10-01 RX ADMIN — HEPARIN SODIUM 5000 UNIT(S): 5000 INJECTION INTRAVENOUS; SUBCUTANEOUS at 05:52

## 2022-10-01 RX ADMIN — ATORVASTATIN CALCIUM 40 MILLIGRAM(S): 80 TABLET, FILM COATED ORAL at 21:00

## 2022-10-01 RX ADMIN — Medication 25 MILLIGRAM(S): at 13:00

## 2022-10-01 RX ADMIN — OXYCODONE HYDROCHLORIDE 5 MILLIGRAM(S): 5 TABLET ORAL at 18:30

## 2022-10-01 RX ADMIN — Medication 2: at 09:25

## 2022-10-01 RX ADMIN — Medication 4: at 18:27

## 2022-10-01 RX ADMIN — OXYCODONE HYDROCHLORIDE 5 MILLIGRAM(S): 5 TABLET ORAL at 22:49

## 2022-10-01 RX ADMIN — SENNA PLUS 2 TABLET(S): 8.6 TABLET ORAL at 21:01

## 2022-10-01 RX ADMIN — GABAPENTIN 300 MILLIGRAM(S): 400 CAPSULE ORAL at 05:52

## 2022-10-01 RX ADMIN — INSULIN GLARGINE 35 UNIT(S): 100 INJECTION, SOLUTION SUBCUTANEOUS at 21:44

## 2022-10-01 RX ADMIN — HEPARIN SODIUM 5000 UNIT(S): 5000 INJECTION INTRAVENOUS; SUBCUTANEOUS at 21:00

## 2022-10-01 RX ADMIN — Medication 14 UNIT(S): at 09:25

## 2022-10-01 RX ADMIN — OXYCODONE HYDROCHLORIDE 5 MILLIGRAM(S): 5 TABLET ORAL at 18:12

## 2022-10-01 RX ADMIN — HEPARIN SODIUM 5000 UNIT(S): 5000 INJECTION INTRAVENOUS; SUBCUTANEOUS at 17:09

## 2022-10-01 RX ADMIN — Medication 2: at 21:13

## 2022-10-01 RX ADMIN — PANTOPRAZOLE SODIUM 40 MILLIGRAM(S): 20 TABLET, DELAYED RELEASE ORAL at 09:26

## 2022-10-01 RX ADMIN — OXYCODONE HYDROCHLORIDE 5 MILLIGRAM(S): 5 TABLET ORAL at 11:59

## 2022-10-01 RX ADMIN — GABAPENTIN 300 MILLIGRAM(S): 400 CAPSULE ORAL at 18:13

## 2022-10-01 NOTE — PROGRESS NOTE ADULT - ASSESSMENT
55 yo M with PMHx of HTN, HLD, DM, PAD, CKD, CVA x3 (2013, 2014, 2015) with residual right sided weakness and aphasia, who originally presented to [facility] [place] following an abnormal stress test. During workup patient had Coronary Catheterization showing pLAD 80% stenosis, Diagonal 1 70% stenosis, Diagonal 2 80% stenosis, Ramus 70% stenosis, OM1 90% stenosis, mRCA 85% stenosis, EF 60%. Patient transferred to [facility] under the care of Dr. Herrera, for CABG. On 9/28 he underwent a CABG x3 (LIMA-LAD, SVG-RCA, SVG-Ramus). He was recovered in the ICU and hemodynamically stable. POD1 he was downgraded to 9  lach with central line, jose luis, and L pleural tube. he began having in range of 100s/50s, pt given albumin with lasix with improvement. His L pleural tube was removed without incident. On POD 2 he became slightly tachycardic in the low 100s, and his Metoprolol was titrated up to 25 mg Q6H which he responded to. Endocrinology has been following and made corrections to his insulin regiment with a glucose goal of 100-180. His TLC and hughes were removed. On POD 3 he began coughing with production and diminished breathsounds at the right lower lobe were appreciated when compared to the left. Ultrasound of the bilateral chest showed a trace to small effusion of the left side but a small to moderate (about 500cc) effusion on the right side. Pigtail placed by Dr. Yusuf at bedside with removal of over 500 cc of fluid. CT removed later that day with no issues. Pt reported feeling much better and improvement of cough. He has been ambulating the halls with staff. He remained stable with possible discharge planning for Sunday. Of note, spoke to pts son Dino over the phone in regards to dispo planning for Sunday and pts son stated the family is preparing for him to return home.     Neurovascular:   -No delirium. Pain well controlled with current regimen.  -acetaminophen     Tablet .. 650 milliGRAM(s) every 6 hours PRN  -gabapentin 300 milliGRAM(s) every 12 hours  -oxyCODONE    IR 5 milliGRAM(s) every 4 hours PRN    Cardiovascular:   #POD3 CABGx3  -continue aspirin 81 mg QD  -continue statin for long term graft patency  -continue Metoprolol to 25 mg Q6H, will change to Toprol 100 QD starting tomorrow AM in preparation for discharge.     Respiratory:   02 Sat = 98% on RA.  RR: 19 (13 - 19)  SpO2: 98% (94% - 100%)  #pigtail placement   -over 500 cc of fluid drained from right lung   -removed with no issues, post removal cxr stable   -Wean to RA from for O2 Sat > 93%.  -Encourage Cough, deep breathing and Use of IS 10x / hr while awake.  -Chest PT 4xdaily    GI:   -Stable  -Continue pantoprazole    Tablet 40 milliGRAM(s) before breakfast  -polyethylene glycol 3350 17 Gram(s) daily  -senna 2 Tablet(s) at bedtime  -PO DASH diet    Renal / :   BUN/Cr Stable 21/1.23  -Continue to monitor I/O's.    Endocrine:    Blood sugar stable   -Endocrine following, increased lantus to 35 and Lispro to 17  -removed Metformin yesterday, f/u dc recs from endo   -continue moderate sliding scale   A1C: 7.6  #Hypothyroidism  -continue Levothyroxine 50 mcg QD  TSH: 2.360    Hematologic:  H/H stable 8.5/26.4  -DVT prophylaxis with Heparin sq    ID:  -Afebrile  -Continue to observe for SIRS/Sepsis Syndrome.  T(C): 37.4, Max: 37.7 (09-30-22 @ 05:01)    Disposition:  -Discharge when medically ready      57 yo M with PMHx of HTN, HLD, DM, PAD, CKD, CVA x3 (2013, 2014, 2015) with residual right sided weakness and aphasia, who originally presented to [facility] following an abnormal stress test. During workup patient had Coronary Catheterization showing pLAD 80% stenosis, Diagonal 1 70% stenosis, Diagonal 2 80% stenosis, Ramus 70% stenosis, OM1 90% stenosis, mRCA 85% stenosis, EF 60%. Patient transferred to [facility] under the care of Dr. Herrera, for CABG. On 9/28 he underwent a CABG x3 (LIMA-LAD, SVG-RCA, SVG-Ramus). He was recovered in the ICU and hemodynamically stable. POD1 he was downgraded to 9  lach with central line, jose luis, and L pleural tube. he began having in range of 100s/50s, pt given albumin with lasix with improvement. His L pleural tube was removed without incident. On POD 2 he became slightly tachycardic in the low 100s, and his Metoprolol was titrated up to 25 mg Q6H which he responded to. Endocrinology has been following and made corrections to his insulin regiment with a glucose goal of 100-180. His TLC and hughes were removed. On POD 3 he began coughing with production and diminished breathsounds at the right lower lobe were appreciated when compared to the left. Ultrasound of the bilateral chest showed a trace to small effusion of the left side but a small to moderate (about 500cc) effusion on the right side. Pigtail placed by Dr. Yusuf at bedside with removal of over 500 cc of fluid. CT removed later that day with no issues. Pt reported feeling much better and improvement of cough. He has been ambulating the halls with staff. He remained stable with possible discharge planning for Sunday. Of note, spoke to pts son Dino over the phone in regards to dispo planning for Sunday and pts son stated the family is preparing for him to return home.     Neurovascular:   #Hx of CVA  -neuro signed off, they were consulted for baseline neuro exam pre op. They stated he is at baseline post op and signed off   -No delirium. Pain well controlled with current regimen.  -acetaminophen     Tablet .. 650 milliGRAM(s) every 6 hours PRN  -gabapentin 300 milliGRAM(s) every 12 hours  -oxyCODONE    IR 5 milliGRAM(s) every 4 hours PRN    Cardiovascular:   #POD3 CABGx3  -continue aspirin 81 mg QD  -continue statin for long term graft patency  -continue Metoprolol to 25 mg Q6H, will change to Toprol 100 QD starting tomorrow AM in preparation for discharge.     Respiratory:   02 Sat = 98% on RA.  RR: 19 (13 - 19)  SpO2: 98% (94% - 100%)  #pigtail placement   -over 500 cc of fluid drained from right lung   -removed with no issues, post removal cxr stable   -Wean to RA from for O2 Sat > 93%.  -Encourage Cough, deep breathing and Use of IS 10x / hr while awake.  -Chest PT 4xdaily    GI:   -Stable  -Continue pantoprazole    Tablet 40 milliGRAM(s) before breakfast  -polyethylene glycol 3350 17 Gram(s) daily  -senna 2 Tablet(s) at bedtime  -PO DASH diet    Renal / :   BUN/Cr Stable 21/1.23  -Continue to monitor I/O's.    Endocrine:    Blood sugar stable   -Endocrine following, increased lantus to 35 and Lispro to 17  -removed Metformin yesterday, f/u dc recs from endo   -continue moderate sliding scale   A1C: 7.6  #Hypothyroidism  -continue Levothyroxine 50 mcg QD  TSH: 2.360    Hematologic:  H/H stable 8.5/26.4  -DVT prophylaxis with Heparin sq    ID:  -Afebrile  -Continue to observe for SIRS/Sepsis Syndrome.  T(C): 37.4, Max: 37.7 (09-30-22 @ 05:01)    Disposition:  -Discharge when medically ready

## 2022-10-01 NOTE — PROCEDURE NOTE - NSPROCDETAILS_GEN_ALL_CORE
Seldinger technique/secured in place/percutaneous/thoracostomy tube placed percutaneously/ultrasound assessment of fluid (location)

## 2022-10-01 NOTE — PROGRESS NOTE ADULT - SUBJECTIVE AND OBJECTIVE BOX
Patient discussed on morning rounds with Dr. Colon     Operation / Date: CABGx3 (LIMA to LAD, SVG to RCA, SVG to Ramus) EF 45% on 9/28     SUBJECTIVE ASSESSMENT:  56y Male seen and examined at bedside with complaints of a cough. Pt stated he feels well otherwise. He has had a post operative BM, tolerated a PO diet, and ambulated with assistance. He denies chest pain or pressure, chills, fevers, sweats, HA, nausea, vomiting, abd pain SOB/MCDOWELL or any further associated symptoms    Vital Signs Last 24 Hrs  T(C): 36.6 (01 Oct 2022 16:00), Max: 37.2 (30 Sep 2022 21:19)  T(F): 97.8 (01 Oct 2022 16:00), Max: 99 (30 Sep 2022 21:19)  HR: 96 (01 Oct 2022 14:19) (94 - 109)  BP: 128/58 (01 Oct 2022 14:19) (100/53 - 128/58)  BP(mean): 83 (01 Oct 2022 14:19) (72 - 84)  RR: 18 (01 Oct 2022 09:08) (18 - 20)  SpO2: 96% (01 Oct 2022 14:19) (92% - 99%)    Parameters below as of 30 Sep 2022 18:00  Patient On (Oxygen Delivery Method): room air      I&O's Detail    30 Sep 2022 07:01  -  01 Oct 2022 07:00  --------------------------------------------------------  IN:    sodium chloride 0.9%: 10 mL  Total IN: 10 mL    OUT:    Indwelling Catheter - Urethral (mL): 175 mL    Voided (mL): 450 mL  Total OUT: 625 mL    Total NET: -615 mL      01 Oct 2022 07:01  -  01 Oct 2022 17:05  --------------------------------------------------------  IN:  Total IN: 0 mL    OUT:    Blood Loss (mL): 500 mL    Voided (mL): 400 mL  Total OUT: 900 mL    Total NET: -900 mL    CHEST TUBE:  Yes pigtail to suction   SILVIA DRAIN:  none   EPICARDIAL WIRES: yes *do not pull wires please cut them, they are sutured in place per Dr. Herrera   TIE DOWNS: yes   BERGMAN: none     PHYSICAL EXAM:  GEN: NAD, looks comfortable  Psych: Mood appropriate  Neuro: A&Ox3.  previous CVA deficits of a mild left facial and slight aphasia appreciated which is his baseline, Moving all extremities.   HEENT: No obvious abnormalities  CV: S1S2, regular, no murmurs appreciated.  No carotid bruits.  No JVD  Lungs: Diminished breath sounds appreciated at the right lower lobe when compared to the left. mild rhonchi heard but cleared when coughing up sputum, No wheezing, or rales   ABD: Soft, non-tender, non-distended.  +Bowel sounds  EXT: Warm and well perfused.  No peripheral edema noted  Musculoskeletal: Moving all extremities with normal ROM, no joint swelling  PV: Pedal pulses palpable  Incisions: MSI meplex removed with small amount of serious oozing from the middle of the incision, resolved. no infectious process appreciated. Drain sites are clean, dry and intact with tie down in place, groins are clean and dry, vein graft site is covered with dermabond, clean dry and intact.     LABS:                        8.5    10.66 )-----------( 149      ( 01 Oct 2022 13:13 )             26.4       PT/INR - ( 01 Oct 2022 13:13 )   PT: 13.0 sec;   INR: 1.09          PTT - ( 01 Oct 2022 13:13 )  PTT:30.4 sec    10-01    133<L>  |  101  |  21  ----------------------------<  188<H>  4.9   |  23  |  1.23    Ca    8.1<L>      01 Oct 2022 06:40  Phos  2.0     09-30  Mg     2.0     10-01    TPro  5.7<L>  /  Alb  3.3  /  TBili  0.4  /  DBili  x   /  AST  30  /  ALT  16  /  AlkPhos  40  09-30    MEDICATIONS  (STANDING):  aspirin enteric coated 81 milliGRAM(s) Oral daily  atorvastatin 40 milliGRAM(s) Oral at bedtime  chlorhexidine 2% Cloths 1 Application(s) Topical daily  dextrose 50% Injectable 25 Gram(s) IV Push once  dextrose 50% Injectable 12.5 Gram(s) IV Push once  gabapentin 300 milliGRAM(s) Oral every 12 hours  heparin   Injectable 5000 Unit(s) SubCutaneous every 8 hours  insulin glargine Injectable (LANTUS) 35 Unit(s) SubCutaneous at bedtime  insulin lispro (ADMELOG) corrective regimen sliding scale   SubCutaneous Before meals and at bedtime  insulin lispro Injectable (ADMELOG) 17 Unit(s) SubCutaneous three times a day before meals  levothyroxine 50 MICROGram(s) Oral daily  metoprolol tartrate 25 milliGRAM(s) Oral every 6 hours  pantoprazole    Tablet 40 milliGRAM(s) Oral before breakfast  polyethylene glycol 3350 17 Gram(s) Oral daily  senna 2 Tablet(s) Oral at bedtime  sodium chloride 0.9%. 1000 milliLiter(s) (10 mL/Hr) IV Continuous <Continuous>    MEDICATIONS  (PRN):  acetaminophen     Tablet .. 650 milliGRAM(s) Oral every 6 hours PRN Mild Pain (1 - 3)  oxyCODONE    IR 5 milliGRAM(s) Oral every 4 hours PRN Moderate Pain (4 - 6)    RADIOLOGY & ADDITIONAL TESTS:  < from: Xray Chest 1 View- PORTABLE-Routine (Xray Chest 1 View- PORTABLE-Routine in AM.) (09.30.22 @ 05:21) >  Findings/  impression: Left apical tiny pneumothorax. Support device in satisfactory   position. Stable cardiomegaly, thoracic aortic calcification, status post   median sternotomy, CABG. Right basilar opacity, increased. Left basilar   opacity, unchanged.    --- End of Report ---      < end of copied text >

## 2022-10-01 NOTE — PROGRESS NOTE ADULT - SUBJECTIVE AND OBJECTIVE BOX
INTERVAL HPI/OVERNIGHT EVENTS:    He is eating well and has no complaints at the time of interview.      Pt reports the following symptoms:    CONSTITUTIONAL:  Negative fever or chills, feels well, good appetite  EYES:  Negative  blurry vision or double vision  CARDIOVASCULAR:  Negative for chest pain or palpitations  RESPIRATORY:  Negative for cough, wheezing, or SOB   GASTROINTESTINAL:  Negative for nausea, vomiting, diarrhea, constipation, or abdominal pain  GENITOURINARY:  Negative frequency, urgency or dysuria  NEUROLOGIC:  No headache, confusion, dizziness, lightheadedness    MEDICATIONS  (STANDING):  aspirin enteric coated 81 milliGRAM(s) Oral daily  atorvastatin 40 milliGRAM(s) Oral at bedtime  chlorhexidine 2% Cloths 1 Application(s) Topical daily  dextrose 50% Injectable 25 Gram(s) IV Push once  dextrose 50% Injectable 12.5 Gram(s) IV Push once  gabapentin 300 milliGRAM(s) Oral every 12 hours  heparin   Injectable 5000 Unit(s) SubCutaneous every 8 hours  insulin glargine Injectable (LANTUS) 30 Unit(s) SubCutaneous at bedtime  insulin lispro (ADMELOG) corrective regimen sliding scale   SubCutaneous Before meals and at bedtime  insulin lispro Injectable (ADMELOG) 14 Unit(s) SubCutaneous three times a day before meals  levothyroxine 50 MICROGram(s) Oral daily  metoprolol tartrate 25 milliGRAM(s) Oral every 6 hours  pantoprazole    Tablet 40 milliGRAM(s) Oral before breakfast  polyethylene glycol 3350 17 Gram(s) Oral daily  senna 2 Tablet(s) Oral at bedtime  sodium chloride 0.9%. 1000 milliLiter(s) (10 mL/Hr) IV Continuous <Continuous>    MEDICATIONS  (PRN):  acetaminophen     Tablet .. 650 milliGRAM(s) Oral every 6 hours PRN Mild Pain (1 - 3)  oxyCODONE    IR 5 milliGRAM(s) Oral every 4 hours PRN Moderate Pain (4 - 6)      PHYSICAL EXAM  Vital Signs Last 24 Hrs  T(C): 36.9 (01 Oct 2022 14:19), Max: 37.2 (30 Sep 2022 21:19)  T(F): 98.4 (01 Oct 2022 14:19), Max: 99 (30 Sep 2022 21:19)  HR: 94 (01 Oct 2022 09:08) (94 - 109)  BP: 100/53 (01 Oct 2022 09:08) (100/53 - 122/58)  BP(mean): 75 (01 Oct 2022 09:08) (72 - 84)  RR: 18 (01 Oct 2022 09:08) (18 - 20)  SpO2: 99% (01 Oct 2022 09:08) (92% - 99%)    Parameters below as of 30 Sep 2022 18:00  Patient On (Oxygen Delivery Method): room air        Constitutional: wn/wd in NAD.   HEENT: NCAT, MMM, OP clear, EOMI, no proptosis or lid retraction  Neck: no thyromegaly or palpable thyroid nodules   Respiratory: lungs CTAB.  Cardiovascular: regular rhythm, normal S1 and S2, no audible murmurs, no peripheral edema  GI: soft, NT/ND, no masses/HSM appreciated.  Neurology: no tremors, DTR 2+  Skin: no visible rashes/lesions  Psychiatric: AAO x 3, normal affect/mood.    LABS:                        8.5    10.66 )-----------( 149      ( 01 Oct 2022 13:13 )             26.4     10-01    133<L>  |  101  |  21  ----------------------------<  188<H>  4.9   |  23  |  1.23    Ca    8.1<L>      01 Oct 2022 06:40  Phos  2.0     09-30  Mg     2.0     10-01    TPro  5.7<L>  /  Alb  3.3  /  TBili  0.4  /  DBili  x   /  AST  30  /  ALT  16  /  AlkPhos  40  09-30    PT/INR - ( 01 Oct 2022 13:13 )   PT: 13.0 sec;   INR: 1.09          PTT - ( 01 Oct 2022 13:13 )  PTT:30.4 sec    Thyroid Stimulating Hormone, Serum: 2.360 uIU/mL (09-22 @ 21:26)      HbA1C:   CAPILLARY BLOOD GLUCOSE      POCT Blood Glucose.: 298 mg/dL (01 Oct 2022 11:55)  POCT Blood Glucose.: 206 mg/dL (01 Oct 2022 06:28)  POCT Blood Glucose.: 287 mg/dL (30 Sep 2022 21:05)  POCT Blood Glucose.: 278 mg/dL (30 Sep 2022 16:49)      Insulin Sliding Scale requirements X 24 Hours:    RADIOLOGY & ADDITIONAL TESTS:    Impression/Recommendations:   HbA1C:   CAPILLARY BLOOD GLUCOSE      POCT Blood Glucose.: 311 mg/dL (30 Sep 2022 11:36)  POCT Blood Glucose.: 269 mg/dL (30 Sep 2022 06:14)  POCT Blood Glucose.: 396 mg/dL (29 Sep 2022 21:15)  POCT Blood Glucose.: 319 mg/dL (29 Sep 2022 18:07)        Impression/Recommendations: Mr. Bello is a 56 year-old man with multiple medical problems including coronary artery disease and type diabetes mellitus admitted for coronary artery bypass grafting.     Diabetes, uncontrolled, complicated.  Please adjust Lantus to 35 units at night  Please adjust Lispro to 17 units with meals  Please continue Lispro moderate dose sliding scale with meals and at bedtime    Patient's fingerstick goal is 100-180 mg/dL.    We will continue to monitor

## 2022-10-02 ENCOUNTER — TRANSCRIPTION ENCOUNTER (OUTPATIENT)
Age: 57
End: 2022-10-02

## 2022-10-02 VITALS
DIASTOLIC BLOOD PRESSURE: 61 MMHG | RESPIRATION RATE: 17 BRPM | SYSTOLIC BLOOD PRESSURE: 128 MMHG | OXYGEN SATURATION: 95 % | HEART RATE: 98 BPM

## 2022-10-02 LAB
ANION GAP SERPL CALC-SCNC: 9 MMOL/L — SIGNIFICANT CHANGE UP (ref 5–17)
BUN SERPL-MCNC: 24 MG/DL — HIGH (ref 7–23)
CALCIUM SERPL-MCNC: 7.9 MG/DL — LOW (ref 8.4–10.5)
CHLORIDE SERPL-SCNC: 100 MMOL/L — SIGNIFICANT CHANGE UP (ref 96–108)
CO2 SERPL-SCNC: 22 MMOL/L — SIGNIFICANT CHANGE UP (ref 22–31)
CREAT SERPL-MCNC: 1.28 MG/DL — SIGNIFICANT CHANGE UP (ref 0.5–1.3)
EGFR: 66 ML/MIN/1.73M2 — SIGNIFICANT CHANGE UP
GLUCOSE BLDC GLUCOMTR-MCNC: 188 MG/DL — HIGH (ref 70–99)
GLUCOSE BLDC GLUCOMTR-MCNC: 218 MG/DL — HIGH (ref 70–99)
GLUCOSE SERPL-MCNC: 188 MG/DL — HIGH (ref 70–99)
HCT VFR BLD CALC: 24.4 % — LOW (ref 39–50)
HGB BLD-MCNC: 7.9 G/DL — LOW (ref 13–17)
MAGNESIUM SERPL-MCNC: 2 MG/DL — SIGNIFICANT CHANGE UP (ref 1.6–2.6)
MCHC RBC-ENTMCNC: 29.9 PG — SIGNIFICANT CHANGE UP (ref 27–34)
MCHC RBC-ENTMCNC: 32.4 GM/DL — SIGNIFICANT CHANGE UP (ref 32–36)
MCV RBC AUTO: 92.4 FL — SIGNIFICANT CHANGE UP (ref 80–100)
NRBC # BLD: 0 /100 WBCS — SIGNIFICANT CHANGE UP (ref 0–0)
PLATELET # BLD AUTO: 195 K/UL — SIGNIFICANT CHANGE UP (ref 150–400)
POTASSIUM SERPL-MCNC: 4.6 MMOL/L — SIGNIFICANT CHANGE UP (ref 3.5–5.3)
POTASSIUM SERPL-SCNC: 4.6 MMOL/L — SIGNIFICANT CHANGE UP (ref 3.5–5.3)
RBC # BLD: 2.64 M/UL — LOW (ref 4.2–5.8)
RBC # FLD: 15.4 % — HIGH (ref 10.3–14.5)
SODIUM SERPL-SCNC: 131 MMOL/L — LOW (ref 135–145)
WBC # BLD: 10.52 K/UL — HIGH (ref 3.8–10.5)
WBC # FLD AUTO: 10.52 K/UL — HIGH (ref 3.8–10.5)

## 2022-10-02 PROCEDURE — 85014 HEMATOCRIT: CPT

## 2022-10-02 PROCEDURE — 93880 EXTRACRANIAL BILAT STUDY: CPT

## 2022-10-02 PROCEDURE — 84443 ASSAY THYROID STIM HORMONE: CPT

## 2022-10-02 PROCEDURE — 71046 X-RAY EXAM CHEST 2 VIEWS: CPT | Mod: 26

## 2022-10-02 PROCEDURE — 82947 ASSAY GLUCOSE BLOOD QUANT: CPT

## 2022-10-02 PROCEDURE — 97530 THERAPEUTIC ACTIVITIES: CPT

## 2022-10-02 PROCEDURE — P9045: CPT

## 2022-10-02 PROCEDURE — 97116 GAIT TRAINING THERAPY: CPT

## 2022-10-02 PROCEDURE — 85027 COMPLETE CBC AUTOMATED: CPT

## 2022-10-02 PROCEDURE — 84484 ASSAY OF TROPONIN QUANT: CPT

## 2022-10-02 PROCEDURE — 82550 ASSAY OF CK (CPK): CPT

## 2022-10-02 PROCEDURE — 97162 PT EVAL MOD COMPLEX 30 MIN: CPT

## 2022-10-02 PROCEDURE — 36430 TRANSFUSION BLD/BLD COMPNT: CPT

## 2022-10-02 PROCEDURE — 70450 CT HEAD/BRAIN W/O DYE: CPT

## 2022-10-02 PROCEDURE — U0005: CPT

## 2022-10-02 PROCEDURE — 82962 GLUCOSE BLOOD TEST: CPT

## 2022-10-02 PROCEDURE — U0003: CPT

## 2022-10-02 PROCEDURE — 93306 TTE W/DOPPLER COMPLETE: CPT

## 2022-10-02 PROCEDURE — 94150 VITAL CAPACITY TEST: CPT

## 2022-10-02 PROCEDURE — 93005 ELECTROCARDIOGRAM TRACING: CPT

## 2022-10-02 PROCEDURE — 86923 COMPATIBILITY TEST ELECTRIC: CPT

## 2022-10-02 PROCEDURE — C1751: CPT

## 2022-10-02 PROCEDURE — 85610 PROTHROMBIN TIME: CPT

## 2022-10-02 PROCEDURE — 84132 ASSAY OF SERUM POTASSIUM: CPT

## 2022-10-02 PROCEDURE — C1889: CPT

## 2022-10-02 PROCEDURE — 86850 RBC ANTIBODY SCREEN: CPT

## 2022-10-02 PROCEDURE — 71046 X-RAY EXAM CHEST 2 VIEWS: CPT

## 2022-10-02 PROCEDURE — 87635 SARS-COV-2 COVID-19 AMP PRB: CPT

## 2022-10-02 PROCEDURE — 83735 ASSAY OF MAGNESIUM: CPT

## 2022-10-02 PROCEDURE — 82330 ASSAY OF CALCIUM: CPT

## 2022-10-02 PROCEDURE — 80061 LIPID PANEL: CPT

## 2022-10-02 PROCEDURE — 80053 COMPREHEN METABOLIC PANEL: CPT

## 2022-10-02 PROCEDURE — 84295 ASSAY OF SERUM SODIUM: CPT

## 2022-10-02 PROCEDURE — 85347 COAGULATION TIME ACTIVATED: CPT

## 2022-10-02 PROCEDURE — 36415 COLL VENOUS BLD VENIPUNCTURE: CPT

## 2022-10-02 PROCEDURE — 71045 X-RAY EXAM CHEST 1 VIEW: CPT

## 2022-10-02 PROCEDURE — 85025 COMPLETE CBC W/AUTO DIFF WBC: CPT

## 2022-10-02 PROCEDURE — 80048 BASIC METABOLIC PNL TOTAL CA: CPT

## 2022-10-02 PROCEDURE — 86803 HEPATITIS C AB TEST: CPT

## 2022-10-02 PROCEDURE — 85730 THROMBOPLASTIN TIME PARTIAL: CPT

## 2022-10-02 PROCEDURE — 71250 CT THORAX DX C-: CPT

## 2022-10-02 PROCEDURE — 84100 ASSAY OF PHOSPHORUS: CPT

## 2022-10-02 PROCEDURE — 94002 VENT MGMT INPAT INIT DAY: CPT

## 2022-10-02 PROCEDURE — 82803 BLOOD GASES ANY COMBINATION: CPT

## 2022-10-02 PROCEDURE — 83036 HEMOGLOBIN GLYCOSYLATED A1C: CPT

## 2022-10-02 PROCEDURE — 86901 BLOOD TYPING SEROLOGIC RH(D): CPT

## 2022-10-02 PROCEDURE — 82553 CREATINE MB FRACTION: CPT

## 2022-10-02 PROCEDURE — P9016: CPT

## 2022-10-02 PROCEDURE — 97161 PT EVAL LOW COMPLEX 20 MIN: CPT

## 2022-10-02 PROCEDURE — 85576 BLOOD PLATELET AGGREGATION: CPT

## 2022-10-02 PROCEDURE — 86891 AUTOLOGOUS BLOOD OP SALVAGE: CPT

## 2022-10-02 PROCEDURE — 71045 X-RAY EXAM CHEST 1 VIEW: CPT | Mod: 26

## 2022-10-02 PROCEDURE — C1729: CPT

## 2022-10-02 PROCEDURE — 83880 ASSAY OF NATRIURETIC PEPTIDE: CPT

## 2022-10-02 PROCEDURE — 80074 ACUTE HEPATITIS PANEL: CPT

## 2022-10-02 PROCEDURE — 86900 BLOOD TYPING SEROLOGIC ABO: CPT

## 2022-10-02 PROCEDURE — 81003 URINALYSIS AUTO W/O SCOPE: CPT

## 2022-10-02 PROCEDURE — 87521 HEPATITIS C PROBE&RVRS TRNSC: CPT

## 2022-10-02 RX ORDER — ACETAMINOPHEN 500 MG
2 TABLET ORAL
Qty: 0 | Refills: 0 | DISCHARGE
Start: 2022-10-02

## 2022-10-02 RX ORDER — LOSARTAN POTASSIUM 100 MG/1
1 TABLET, FILM COATED ORAL
Qty: 0 | Refills: 0 | DISCHARGE

## 2022-10-02 RX ORDER — ASPIRIN/CALCIUM CARB/MAGNESIUM 324 MG
1 TABLET ORAL
Qty: 30 | Refills: 0
Start: 2022-10-02 | End: 2022-10-31

## 2022-10-02 RX ORDER — OXYCODONE HYDROCHLORIDE 5 MG/1
1 TABLET ORAL
Qty: 20 | Refills: 0
Start: 2022-10-02 | End: 2022-10-06

## 2022-10-02 RX ORDER — POLYETHYLENE GLYCOL 3350 17 G/17G
17 POWDER, FOR SOLUTION ORAL
Qty: 255 | Refills: 0
Start: 2022-10-02 | End: 2022-10-16

## 2022-10-02 RX ORDER — PANTOPRAZOLE SODIUM 20 MG/1
1 TABLET, DELAYED RELEASE ORAL
Qty: 30 | Refills: 0
Start: 2022-10-02 | End: 2022-10-31

## 2022-10-02 RX ORDER — FUROSEMIDE 40 MG
1 TABLET ORAL
Qty: 3 | Refills: 0
Start: 2022-10-02 | End: 2022-10-04

## 2022-10-02 RX ORDER — ATORVASTATIN CALCIUM 80 MG/1
1 TABLET, FILM COATED ORAL
Qty: 0 | Refills: 0 | DISCHARGE

## 2022-10-02 RX ORDER — METOPROLOL TARTRATE 50 MG
1 TABLET ORAL
Qty: 30 | Refills: 0
Start: 2022-10-02 | End: 2022-10-31

## 2022-10-02 RX ORDER — ASPIRIN/CALCIUM CARB/MAGNESIUM 324 MG
1 TABLET ORAL
Qty: 0 | Refills: 0 | DISCHARGE

## 2022-10-02 RX ORDER — FUROSEMIDE 40 MG
1 TABLET ORAL
Qty: 0 | Refills: 0 | DISCHARGE

## 2022-10-02 RX ORDER — ATORVASTATIN CALCIUM 80 MG/1
1 TABLET, FILM COATED ORAL
Qty: 30 | Refills: 0
Start: 2022-10-02 | End: 2022-10-31

## 2022-10-02 RX ORDER — METOPROLOL TARTRATE 50 MG
1 TABLET ORAL
Qty: 0 | Refills: 0 | DISCHARGE

## 2022-10-02 RX ADMIN — Medication 17 UNIT(S): at 07:29

## 2022-10-02 RX ADMIN — OXYCODONE HYDROCHLORIDE 5 MILLIGRAM(S): 5 TABLET ORAL at 07:40

## 2022-10-02 RX ADMIN — OXYCODONE HYDROCHLORIDE 5 MILLIGRAM(S): 5 TABLET ORAL at 07:10

## 2022-10-02 RX ADMIN — HEPARIN SODIUM 5000 UNIT(S): 5000 INJECTION INTRAVENOUS; SUBCUTANEOUS at 13:20

## 2022-10-02 RX ADMIN — Medication 650 MILLIGRAM(S): at 11:54

## 2022-10-02 RX ADMIN — Medication 50 MICROGRAM(S): at 05:25

## 2022-10-02 RX ADMIN — Medication 81 MILLIGRAM(S): at 11:46

## 2022-10-02 RX ADMIN — CHLORHEXIDINE GLUCONATE 1 APPLICATION(S): 213 SOLUTION TOPICAL at 08:31

## 2022-10-02 RX ADMIN — HEPARIN SODIUM 5000 UNIT(S): 5000 INJECTION INTRAVENOUS; SUBCUTANEOUS at 05:25

## 2022-10-02 RX ADMIN — Medication 4: at 13:24

## 2022-10-02 RX ADMIN — Medication 25 MILLIGRAM(S): at 06:11

## 2022-10-02 RX ADMIN — Medication 2: at 07:27

## 2022-10-02 RX ADMIN — PANTOPRAZOLE SODIUM 40 MILLIGRAM(S): 20 TABLET, DELAYED RELEASE ORAL at 06:11

## 2022-10-02 RX ADMIN — GABAPENTIN 300 MILLIGRAM(S): 400 CAPSULE ORAL at 05:25

## 2022-10-02 RX ADMIN — Medication 17 UNIT(S): at 13:24

## 2022-10-02 RX ADMIN — Medication 650 MILLIGRAM(S): at 12:16

## 2022-10-02 NOTE — DISCHARGE NOTE PROVIDER - CARE PROVIDERS DIRECT ADDRESSES
[email or web address]

## 2022-10-02 NOTE — PROGRESS NOTE ADULT - PROVIDER SPECIALTY LIST ADULT
CT Surgery
Critical Care
Endocrinology
Endocrinology
CT Surgery
Critical Care
Endocrinology
Endocrinology
CT Surgery
Critical Care
CT Surgery
Endocrinology
Neurology
Endocrinology

## 2022-10-02 NOTE — DISCHARGE NOTE PROVIDER - NSDCCPTREATMENT_GEN_ALL_CORE_FT
PRINCIPAL PROCEDURE  Procedure: CABG, with ADELINA  Findings and Treatment: LIMA-LAD, SVG-RCA, SVG-Ramus EF 45%  You have undergone heart surgery, it is important that you attend your follow up appointments and take your prescribed medications.

## 2022-10-02 NOTE — PROGRESS NOTE ADULT - SUBJECTIVE AND OBJECTIVE BOX
Patient discussed on morning rounds with Dr. Colon     Operation / Date: CABG x3 (LIMA to LAD, SVG to RCA, SVG to Ramus) EF of 45% on 9/28    Surgeon: Dr. Herrera     Referring Physician: none, Sent from [facility]     SUBJECTIVE ASSESSMENT   56y Male seen and examined at bedside with no complaints. He stated that he feels he would like to have home physical therapy. He stated that he is going to use his walker daily, continue using his heart pillow while coughing and use his IS. He had ambulated multiple times over the weekend with staff and PT and practiced the stairs with PT. He tolerated a PO diet, and had a post operative BM. Educated the family that they are to not put any creams or ointments over his incisions. He denies chest pain or pressure, fevers, chills, sweats, HA, nausea, vomiting or any further associated symptoms.     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 During workup patient had Coronary Catheterization showing pLAD 80% stenosis, Diagonal 1 70% stenosis, Diagonal 2 80% stenosis, Ramus 70% stenosis, OM1 90% stenosis, mRCA 85% stenosis, EF 60%. Patient transferred to [facility] under the care of Dr. Herrera, for CABG. On 9/28 he underwent a CABG x3 (LIMA-LAD, SVG-RCA, SVG-Ramus). He was recovered in the ICU and hemodynamically stable. POD1 he was downgraded to 9  lach with central line, jose luis, and L pleural tube. he began having in range of 100s/50s, pt given albumin with lasix with improvement. His L pleural tube was removed without incident. On POD 2 he became slightly tachycardic in the low 100s, and his Metoprolol was titrated up to 25 mg Q6H which he responded to. Endocrinology has been following and made corrections to his insulin regiment with a glucose goal of 100-180. His TLC and hughes were removed. On POD 3 he began coughing with production and diminished breathsounds at the right lower lobe were appreciated when compared to the left. Ultrasound of the bilateral chest showed a trace to small effusion of the left side but a small to moderate (about 500cc) effusion on the right side. Pigtail placed by Dr. Yusuf at bedside with removal of over 500 cc of fluid. CT removed later that day with no issues. Pt reported feeling much better and improvement of cough. He has been ambulating the halls with staff. He remained stable with possible discharge planning for Sunday. Of note, spoke to pts son Dino over the phone in regards to dispo planning for Sunday and pts son stated the family is preparing for him to return home on Sunday. On POD 4 he was deemed medically stable for discharge by Dr. Herrera. CXR from morning reviewed on AM rounds with Dr. Colon and Dr. Yusuf who agreed that he is to take deep breaths and no need to drain the small to trace effusion seen on US and in the CXR. Pt worked with Physical therapy who deemed him appropriate for home PT which was set up by case management and first visit being Tomorrow, Monday 10/3. Spoke with Endocrinology who stated that he is to resume all his home DM medications and pt and pt family stated they will be following his pcp in regards to his management and they have an appointment in 2 weeks. Provided the pt with endocrinology followup with our endocrinology clinic as well. Pts pacing wires were cut at bedside and all tie downs removed. He had one dressing from his pigtail catheter on his right back which is to be removed in 2 days (10/4). Educated the pt and family extensively on hugging heart pillow while coughing, the importance of deep breathes and walking as well as the use of his IS. Pt was previously on Lasix 20 mg PO at home but did not require any lasix during his hospitalization. He was discharged with a 3 day course of Lasix 20 mg and at time of his follow up appointment can deem if needed for long term. Pt and family were in complete agreement with the plan and expressed understanding. At time of discharge he had a post operative BM, tolerated a PO diet and ambulated with nursing staff. He denied chest pain or pressure, fevers, chills, sweats, HA, nausea, vomiting, SOB/MCDOWELL, lightheadedness, or any further associated symptoms.     Over 35 minutes was spent with the patient reviewing the discharge material including medications, follow up appointments, recovery, concerning symptoms, and how to contact their health care providers if they have questions    CABG  Aspirin               [  X] Yes  [  ] Contraindicated, Reason_______________________________  Beta-Blocker     [ X ] Yes  [  ]Contraindicated, Reason_______________________________  Statin                 [ X ] Yes  [  ] Contraindicated, Reason_______________________________  Lasix		Yes, three day course Lasix 20mg, no need for K due to pts K being elevated at      Vital Signs Last 24 Hrs  T(C): 36.4 (02 Oct 2022 14:18), Max: 37.3 (01 Oct 2022 21:01)  T(F): 97.5 (02 Oct 2022 14:18), Max: 99.2 (01 Oct 2022 21:01)  HR: 98 (02 Oct 2022 15:30) (91 - 110)  BP: 128/61 (02 Oct 2022 15:30) (110/60 - 131/61)  BP(mean): 88 (02 Oct 2022 15:30) (77 - 100)  RR: 17 (02 Oct 2022 15:30) (17 - 19)  SpO2: 95% (02 Oct 2022 15:30) (95% - 100%)    Parameters below as of 02 Oct 2022 15:30  Patient On (Oxygen Delivery Method): room air    EPICARDIAL WIRES REMOVED: Yes (cut, due to the wires being sutured in place per Dr. Herrera)  TIE DOWNS REMOVED: Yes    PHYSICAL EXAM:  GEN: NAD, looks comfortable  Psych: Mood appropriate  Neuro: A&Ox3.  No focal deficits.  Moving all extremities.   HEENT: No obvious abnormalities  CV: S1S2, regular, no murmurs appreciated.  No carotid bruits.  No JVD  Lungs: Clear B/L.  No wheezing, rales or rhonchi  ABD: Soft, non-tender, non-distended.  +Bowel sounds  EXT: Warm and well perfused.  No peripheral edema noted  Musculoskeletal: Moving all extremities with normal ROM, no joint swelling  PV: Pedal pulses palpable  Incision Sites: MSI is clean dry and intact, small amount of oozing in the morning which did not occur again, dry dressing was clean for the entirely of the day. No sternal click or any ballotable fluid noted. Drain sites are clean dry and intact with tie downs removed. TLC site is clean and dry, vein graft site is clean and dry, radial harvest site is clean dry and intact with dermabond in place.     LABS:                        7.9    10.52 )-----------( 195      ( 02 Oct 2022 06:22 )             24.4       COUMADIN:  Not indicated     PT/INR - ( 01 Oct 2022 13:13 )   PT: 13.0 sec;   INR: 1.09          PTT - ( 01 Oct 2022 13:13 )  PTT:30.4 sec    10-02    131<L>  |  100  |  24<H>  ----------------------------<  188<H>  4.6   |  22  |  1.28    Ca    7.9<L>      02 Oct 2022 06:22  Mg     2.0     10-02        Discharge CXR:  < from: Xray Chest 1 View- PORTABLE-Routine (Xray Chest 1 View- PORTABLE-Routine in AM.) (10.02.22 @ 05:37) >    IMPRESSION: Improvement left effusion    --- End of Report ---        < end of copied text >

## 2022-10-02 NOTE — OCCUPATIONAL THERAPY INITIAL EVALUATION ADULT - LIVES WITH, PROFILE
Pt lives with wife in private house with 1 FOS to enter. Pt at baseline is ind for ADLs and functional mobility./spouse

## 2022-10-02 NOTE — CHART NOTE - NSCHARTNOTEFT_GEN_A_CORE
Exam:  US Chest    Procedure Date:    History: 41y Male whose CXR today shows a possible right sided pleural effusion.  Pt is POD #3 from CABG x3    Findings:                    Evaluation of the LEFT side of the thoracic cavity demonstrates                   trace to small pleural effusion, no drainable window                                  Evaluation of the RIGHT side of the thoracic cavity demonstrates                   small to moderate pleural effusion                  -suggestive of pigtail placement for drainage                     Lung is freely movable with in thoracic cavity                 Impression:  small to moderate pleural effusion of right chest approx 500 cc fluid
Left radial artery tested for possible use as conduit during CABG. Both doppler and PI device techniques used. With PI device, PI 0.8, decreased to 0.5 with compression but rebounded to 0.75 after 30 second. With Doppler, deep palmar arch found, radial artery compressed, and doppler increased in volume. Left radial artery appropriate for use for possible conduit.
This patient underwent vein mapping at bedside with ultrasonogram without difficulty.  There are saphenous veins identified bilaterally with measurement of .25-.30mm.
CT Removal:    Pt seen and examined at bedside.  Case discussed with Dr. Herrera and is recommending removal of CT.  Minimal output from CT.  No air leak appreciated.  CT removed without incident.  Occlusive dressing placed. Follow up CXR with no obvious PTX noted.  Pt tolerated procedure well and remained hemodynamically stable.
Pt exhibiting intrinsic heart rate and rhythm.  Per Dr. Colon/ Dr. Herrera pacing wires removed (cut) at bedside after cleaning in usual sterile fashion.  No acute issues.  Pt tolerated the procedure well.

## 2022-10-02 NOTE — OCCUPATIONAL THERAPY INITIAL EVALUATION ADULT - DIAGNOSIS, OT EVAL
Pt p/w residual aphagia now demonstrating decrease in balance and strength and pacing affecting ADLs and functional mobility.

## 2022-10-02 NOTE — DISCHARGE NOTE NURSING/CASE MANAGEMENT/SOCIAL WORK - PATIENT PORTAL LINK FT
You can access the FollowMyHealth Patient Portal offered by [facility] by registering at the following website: [email or web address]. By joining [employer]’s FollowMyHealth portal, you will also be able to view your health information using other applications (apps) compatible with our system.

## 2022-10-02 NOTE — PROGRESS NOTE ADULT - ASSESSMENT
Med Reconciliation:  Medication Reconciliation Status	Admission Reconciliation is Completed  Discharge Reconciliation is Completed  Discharge Medications	acetaminophen 325 mg oral tablet: 2 tab(s) orally every 6 hours, As needed, Mild Pain (1 - 3)  ALOGLIPTIN 25MG TAB: 1 tab(s) orally once a day  aspirin 81 mg oral delayed release tablet: 1 tab(s) orally once a day  atorvastatin 40 mg oral tablet: 1 tab(s) orally once a day (at bedtime)  FUROSEMIDE 20MG TAB: 1 tab(s) orally once a day only for three days.   GABAPENTIN 300MG CAP: 1 cap(s) orally every 12 hours  JARDIANCE 25MG TAB: 1 tab(s) orally once a day  LEVOTHYROXINE SODIUM 50MCG TAB: 1 tab(s) orally once a day  METFORMIN HYDROCHLORIDE 500MG TAB: 1 tab(s) orally every 12 hours  metoprolol succinate 100 mg oral tablet, extended release: 1 tab(s) orally once a day  NOVOLIN 70/30 FLEXPEN 70/30 PEN: 10 unit(s) subcutaneous every 12 hours  oxyCODONE 5 mg oral tablet: 1 tab(s) orally every 6 hours, As Needed -Moderate Pain (4 - 6) MDD:4  pantoprazole 40 mg oral delayed release tablet: 1 tab(s) orally once a day (before a meal)  polyethylene glycol 3350 oral powder for reconstitution: 17 gram(s) orally once a day  ,  ,     Care Plan/Procedures:  Discharge Diagnoses, Assessment and Plan of Treatment	PRINCIPAL DISCHARGE DIAGNOSIS  Diagnosis: CAD (coronary artery disease)  Assessment and Plan of Treatment:  Discharge Procedures, Findings and Treatment	PRINCIPAL PROCEDURE  Procedure: CABG, with ADELINA  Findings and Treatment: LIMA-LAD, SVG-RCA, SVG-Ramus EF 45%  You have undergone heart surgery, it is important that you attend your follow up appointments and take your prescribed medications.  Goal(s)	To get better and follow your care plan as instructed.     Follow Up:  Care Providers for Follow up (PCP/Outpatient Provider)	Diego Herrera)  Thoracic and Cardiac Surgery  [address] [place] [address]  Phone: [phone]  Fax: [phone]  Scheduled Appointment: 10/10/2022 02:00 PM    Laura Carlson)  Neurology  [address] [address]  Phone: [phone]  Fax: [phone]  Follow Up Time: 1 month  Patient's Scheduled Appointments	VJ Herrera  [facility]  CTS[facility]G [address]  Scheduled Appointment: 10/10/2022  Additional Scheduled Appointments	Regarding your Endocrinology follow up appointment, You will be following up with the endocrinology clinic on [address] in 1-2 weeks. The office will be contacting you regarding a date and time. Please call the office if you do not hear from the office by 10/5. The office phone number is [phone].     Regarding your follow up appointment with Dr. Carlson, the office of Dr. Carlson will be calling you to schedule an appointment in 1 month.  Discharge Diet	DASH Diet  Activity	No heavy lifting/straining, Stairs allowed, Walking - Indoors allowed, Walking - Outdoors allowed, Follow Instructions Provided by your Surgical Team  Additional Instructions	-Physical therapy will be coming to your home tomorrow to begin your first session of home physical therapy. Please continue to walk with your rolling walker that you were given at discharge.     -Walk daily as tolerated and use your incentive spirometer 10 times every hour while you are awake.     -Please weigh yourself daily. If you notice over a 3 pound weight gain in 3 days, this is a sign you are likely retaining too much fluid. It is imperative you call our right away with unexplained rapid weight gain.      -Please continue to wear the compression stockings given to you in the hospital at home. This is a way to prevent fluid from building up in your legs.     -No driving or strenuous activity/exercise until cleared by your surgeon.    -Gently clean your incisions with unscented/antibacterial soap and water, pat dry.  You may leave them open to air.    -Call your doctor if you have shortness of breath, chest pain not relieved by pain medication, dizziness, fever >100.5, or increased redness or drainage from incisions.     Quality Measures:  Patient Condition	Stable  Hospice Patient	No  Does the patient have difficulty running errands alone like visiting a doctor’s office or shopping?	No  Does the patient have difficulty climbing stairs?	No  Does the patient have a principal diagnosis of ischemic stroke, hemorrhagic stroke, or TIA?	No  Does the patient have a principal diagnosis of Acute Myocardial Infarction?	No  Has the patient had a Percutaneous Coronary Intervention?	No

## 2022-10-02 NOTE — DISCHARGE NOTE PROVIDER - CARE PROVIDER_API CALL
Diego Herrera)  Thoracic and Cardiac Surgery  [address] [place] [address]  Phone: [phone]  Fax: [phone]  Scheduled Appointment: 10/10/2022 02:00 PM    Laura Carlson)  Neurology  [address] [address]  Phone: [phone]  Fax: [phone]  Follow Up Time: 1 month

## 2022-10-02 NOTE — OCCUPATIONAL THERAPY INITIAL EVALUATION ADULT - PERTINENT HX OF CURRENT PROBLEM, REHAB EVAL
57 yo M with with residual right sided weakness and aphasia, who originally presented to [facility] following an abnormal stress test. During workup patient had Coronary Catheterization showing pLAD 80% stenosis, Diagonal 1 70% stenosis, Diagonal 2 80% stenosis, Ramus 70% stenosis, OM1 90% stenosis, mRCA 85% stenosis, EF 60%. Patient transferred to [facility] under the care of Dr. Herrera, for CABG. On 9/28 he underwent a CABG x3

## 2022-10-02 NOTE — CHART NOTE - NSCHARTNOTESELECT_GEN_ALL_CORE
Event Note
PW removal/Event Note
Left Radial Testing/Event Note
US of chest/Event Note
vein mapping/Event Note

## 2022-10-02 NOTE — DISCHARGE NOTE PROVIDER - HOSPITAL COURSE
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 During workup patient had Coronary Catheterization showing pLAD 80% stenosis, Diagonal 1 70% stenosis, Diagonal 2 80% stenosis, Ramus 70% stenosis, OM1 90% stenosis, mRCA 85% stenosis, EF 60%. Patient transferred to [facility] under the care of Dr. Herrera, for CABG. On 9/28 he underwent a CABG x3 (LIMA-LAD, SVG-RCA, SVG-Ramus). He was recovered in the ICU and hemodynamically stable. POD1 he was downgraded to 9  lach with central line, jose luis, and L pleural tube. he began having in range of 100s/50s, pt given albumin with lasix with improvement. His L pleural tube was removed without incident. On POD 2 he became slightly tachycardic in the low 100s, and his Metoprolol was titrated up to 25 mg Q6H which he responded to. Endocrinology has been following and made corrections to his insulin regiment with a glucose goal of 100-180. His TLC and hughes were removed. On POD 3 he began coughing with production and diminished breathsounds at the right lower lobe were appreciated when compared to the left. Ultrasound of the bilateral chest showed a trace to small effusion of the left side but a small to moderate (about 500cc) effusion on the right side. Pigtail placed by Dr. Yusuf at bedside with removal of over 500 cc of fluid. CT removed later that day with no issues. Pt reported feeling much better and improvement of cough. He has been ambulating the halls with staff. He remained stable with possible discharge planning for Sunday. Of note, spoke to pts son Dino over the phone in regards to dispo planning for Sunday and pts son stated the family is preparing for him to return home. 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 During workup patient had Coronary Catheterization showing pLAD 80% stenosis, Diagonal 1 70% stenosis, Diagonal 2 80% stenosis, Ramus 70% stenosis, OM1 90% stenosis, mRCA 85% stenosis, EF 60%. Patient transferred to [facility] under the care of Dr. Herrera, for CABG. On 9/28 he underwent a CABG x3 (LIMA-LAD, SVG-RCA, SVG-Ramus). He was recovered in the ICU and hemodynamically stable. POD1 he was downgraded to 9  lach with central line, jose luis, and L pleural tube. he began having in range of 100s/50s, pt given albumin with lasix with improvement. His L pleural tube was removed without incident. On POD 2 he became slightly tachycardic in the low 100s, and his Metoprolol was titrated up to 25 mg Q6H which he responded to. Endocrinology has been following and made corrections to his insulin regiment with a glucose goal of 100-180. His TLC and hughes were removed. On POD 3 he began coughing with production and diminished breathsounds at the right lower lobe were appreciated when compared to the left. Ultrasound of the bilateral chest showed a trace to small effusion of the left side but a small to moderate (about 500cc) effusion on the right side. Pigtail placed by Dr. Yusuf at bedside with removal of over 500 cc of fluid. CT removed later that day with no issues. Pt reported feeling much better and improvement of cough. He has been ambulating the halls with staff. He remained stable with possible discharge planning for Sunday. Of note, spoke to pts son Dino over the phone in regards to dispo planning for Sunday and pts son stated the family is preparing for him to return home on Sunday. On POD 4 he was deemed medically stable for discharge by Dr. Herrera. CXR from morning reviewed on AM rounds with Dr. Colon and Dr. Yusuf who agreed that he is to take deep breaths and no need to drain the small to trace effusion seen on US and in the CXR. Pt worked with Physical therapy who deemed him appropriate for home PT which was set up by case management and first visit being Tomorrow, Monday 10/3. Spoke with Endocrinology who stated that he is to resume all his home DM medications and pt and pt family stated they will be following his pcp in regards to his management and they have an appointment in 2 weeks. Provided the pt with endocrinology followup with our endocrinology clinic as well. Pts pacing wires were cut at bedside and all tie downs removed. He had one dressing from his pigtail catheter on his right back which is to be removed in 2 days (10/4). Educated the pt and family extensively on hugging heart pillow while coughing, the importance of deep breathes and walking as well as the use of his IS. Pt was previously on Lasix 20 mg PO at home but did not require any lasix during his hospitalization. He was discharged with a 3 day course of Lasix 20 mg and at time of his follow up appointment can deem if needed for long term. Pt and family were in complete agreement with the plan and expressed understanding. At time of discharge he had a post operative BM, tolerated a PO diet and ambulated with nursing staff. He denied chest pain or pressure, fevers, chills, sweats, HA, nausea, vomiting, SOB/MCDOWELL, lightheadedness, or any further associated symptoms.     Over 35 minutes was spent with the patient reviewing the discharge material including medications, follow up appointments, recovery, concerning symptoms, and how to contact their health care providers if they have questions    CABG  Aspirin               [  X] Yes  [  ] Contraindicated, Reason_______________________________  Beta-Blocker     [ X ] Yes  [  ]Contraindicated, Reason_______________________________  Statin                 [ X ] Yes  [  ] Contraindicated, Reason_______________________________  Lasix		Yes, three day course Lasix 20mg, no need for K due to pts K being elevated at hospital admission. 57 yo M with PMHx of HTN, HLD, DM, PAD, CKD, CVA x3 (2013, 2014, 2015) with residual right sided weakness and aphasia, who originally presented to [facility] following an abnormal stress test. During workup patient had Coronary Catheterization showing pLAD 80% stenosis, Diagonal 1 70% stenosis, Diagonal 2 80% stenosis, Ramus 70% stenosis, OM1 90% stenosis, mRCA 85% stenosis, EF 60%. Patient transferred to [facility] under the care of Dr. Herrera, for CABG. On 9/28 he underwent a CABG x3 (LIMA-LAD, SVG-RCA, SVG-Ramus). He was recovered in the ICU and hemodynamically stable. POD1 he was downgraded to 9  lach with central line, jose luis, and L pleural tube. he began having in range of 100s/50s, pt given albumin with lasix with improvement. His L pleural tube was removed without incident. On POD 2 he became slightly tachycardic in the low 100s, and his Metoprolol was titrated up to 25 mg Q6H which he responded to. Endocrinology has been following and made corrections to his insulin regiment with a glucose goal of 100-180. His TLC and hughes were removed. On POD 3 he began coughing with production and diminished breathsounds at the right lower lobe were appreciated when compared to the left. Ultrasound of the bilateral chest showed a trace to small effusion of the left side but a small to moderate (about 500cc) effusion on the right side. Pigtail placed by Dr. Yusuf at bedside with removal of over 500 cc of fluid. CT removed later that day with no issues. Pt reported feeling much better and improvement of cough. He has been ambulating the halls with staff. He remained stable with possible discharge planning for Sunday. Of note, spoke to pts son Dino over the phone in regards to dispo planning for Sunday and pts son stated the family is preparing for him to return home on Sunday. On POD 4 he was deemed medically stable for discharge by Dr. Herrera. CXR from morning reviewed on AM rounds with Dr. Colon and Dr. Yusuf who agreed that he is to take deep breaths and no need to drain the small to trace effusion seen on US and in the CXR. Lab results reviewed during rounds with no concerns or further managment. Pt worked with Physical therapy who deemed him appropriate for home PT which was set up by case management and first visit being Tomorrow, Monday 10/3. Spoke with Endocrinology who stated that he is to resume all his home DM medications and pt and pt family stated they will be following his pcp in regards to his management and they have an appointment in 2 weeks. Provided the pt with endocrinology followup with our endocrinology clinic as well. Pts pacing wires were cut at bedside and all tie downs removed. He had one dressing from his pigtail catheter on his right back which is to be removed in 2 days (10/4). Educated the pt and family extensively on hugging heart pillow while coughing, the importance of deep breathes and walking as well as the use of his IS. Pt was previously on Lasix 20 mg PO at home but did not require any lasix during his hospitalization. He was discharged with a 3 day course of Lasix 20 mg and at time of his follow up appointment can deem if needed for long term. Pt and family were in complete agreement with the plan and expressed understanding. At time of discharge he had a post operative BM, tolerated a PO diet and ambulated with nursing staff. He denied chest pain or pressure, fevers, chills, sweats, HA, nausea, vomiting, SOB/MCDOWELL, lightheadedness, or any further associated symptoms.     Over 35 minutes was spent with the patient reviewing the discharge material including medications, follow up appointments, recovery, concerning symptoms, and how to contact their health care providers if they have questions    CABG  Aspirin               [  X] Yes  [  ] Contraindicated, Reason_______________________________  Beta-Blocker     [ X ] Yes  [  ]Contraindicated, Reason_______________________________  Statin                 [ X ] Yes  [  ] Contraindicated, Reason_______________________________  Lasix		Yes, three day course Lasix 20mg, no need for K due to pts K being elevated at hospital admission.

## 2022-10-02 NOTE — DISCHARGE NOTE PROVIDER - PROVIDER TOKENS
PROVIDER:[TOKEN:[[id number]],SCHEDULEDAPPT:[10/10/2022],SCHEDULEDAPPTTIME:[02:00 PM]],PROVIDER:[TOKEN:[[id number]],FOLLOWUP:[1 month]]

## 2022-10-02 NOTE — DISCHARGE NOTE PROVIDER - NSDCFUSCHEDAPPT_GEN_ALL_CORE_FT
VJ Herrera  [facility]  CTS[facility]G [address]  Scheduled Appointment: 10/10/2022

## 2022-10-02 NOTE — DISCHARGE NOTE PROVIDER - NSDCMRMEDTOKEN_GEN_ALL_CORE_FT
acetaminophen 325 mg oral tablet: 2 tab(s) orally every 6 hours, As needed, Mild Pain (1 - 3)  ALOGLIPTIN 25MG TAB: 1 tab(s) orally once a day  aspirin 81 mg oral delayed release tablet: 1 tab(s) orally once a day  atorvastatin 40 mg oral tablet: 1 tab(s) orally once a day (at bedtime)  FUROSEMIDE 20MG TAB: 1 tab(s) orally once a day only for three days.   GABAPENTIN 300MG CAP: 1 cap(s) orally every 12 hours  JARDIANCE 25MG TAB: 1 tab(s) orally once a day  LEVOTHYROXINE SODIUM 50MCG TAB: 1 tab(s) orally once a day  METFORMIN HYDROCHLORIDE 500MG TAB: 1 tab(s) orally every 12 hours  metoprolol succinate 100 mg oral tablet, extended release: 1 tab(s) orally once a day  NOVOLIN 70/30 FLEXPEN 70/30 PEN: 10 unit(s) subcutaneous every 12 hours  oxyCODONE 5 mg oral tablet: 1 tab(s) orally every 6 hours, As Needed -Moderate Pain (4 - 6) MDD:4  pantoprazole 40 mg oral delayed release tablet: 1 tab(s) orally once a day (before a meal)  polyethylene glycol 3350 oral powder for reconstitution: 17 gram(s) orally once a day

## 2022-10-02 NOTE — DISCHARGE NOTE PROVIDER - NSDCFUADDAPPT_GEN_ALL_CORE_FT
Regarding your Endocrinology follow up appointment, You will be following up with the endocrinology clinic on [address] in 1-2 weeks. The office will be contacting you regarding a date and time. Please call the office if you do not hear from the office by 10/5. The office phone number is [phone].     Regarding your follow up appointment with Dr. Carlson, the office of Dr. Carlson will be calling you to schedule an appointment in 1 month.

## 2022-10-02 NOTE — DISCHARGE NOTE PROVIDER - NSDCFUADDINST_GEN_ALL_CORE_FT
-Walk daily as tolerated and use your incentive spirometer 10 times every hour while you are awake.     -Please weigh yourself daily. If you notice over a 3 pound weight gain in 3 days, this is a sign you are likely retaining too much fluid. It is imperative you call our right away with unexplained rapid weight gain.      -Please continue to wear the compression stockings given to you in the hospital at home. This is a way to prevent fluid from building up in your legs.     -No driving or strenuous activity/exercise until cleared by your surgeon.    -Gently clean your incisions with unscented/antibacterial soap and water, pat dry.  You may leave them open to air.    -Call your doctor if you have shortness of breath, chest pain not relieved by pain medication, dizziness, fever >100.5, or increased redness or drainage from incisions.   -Physical therapy will be coming to your home tomorrow to begin your first session of home physical therapy. Please continue to walk with your rolling walker that you were given at discharge.     -Walk daily as tolerated and use your incentive spirometer 10 times every hour while you are awake.     -Please weigh yourself daily. If you notice over a 3 pound weight gain in 3 days, this is a sign you are likely retaining too much fluid. It is imperative you call our right away with unexplained rapid weight gain.      -Please continue to wear the compression stockings given to you in the hospital at home. This is a way to prevent fluid from building up in your legs.     -No driving or strenuous activity/exercise until cleared by your surgeon.    -Gently clean your incisions with unscented/antibacterial soap and water, pat dry.  You may leave them open to air.    -Call your doctor if you have shortness of breath, chest pain not relieved by pain medication, dizziness, fever >100.5, or increased redness or drainage from incisions.

## 2022-10-03 ENCOUNTER — APPOINTMENT (OUTPATIENT)
Dept: CARE COORDINATION | Facility: HOME HEALTH | Age: 57
End: 2022-10-03

## 2022-10-03 PROCEDURE — 99024 POSTOP FOLLOW-UP VISIT: CPT

## 2022-10-03 RX ORDER — PANTOPRAZOLE SODIUM 40 MG/1
40 TABLET, DELAYED RELEASE ORAL
Refills: 0 | Status: ACTIVE | COMMUNITY

## 2022-10-03 RX ORDER — ASPIRIN ENTERIC COATED TABLETS 81 MG 81 MG/1
81 TABLET, DELAYED RELEASE ORAL
Refills: 0 | Status: ACTIVE | COMMUNITY

## 2022-10-03 RX ORDER — HUMAN INSULIN 100 [IU]/ML
(70-30) 100 INJECTION, SUSPENSION SUBCUTANEOUS
Refills: 0 | Status: ACTIVE | COMMUNITY

## 2022-10-03 RX ORDER — LEVOTHYROXINE SODIUM 0.05 MG/1
50 TABLET ORAL
Refills: 0 | Status: ACTIVE | COMMUNITY

## 2022-10-03 RX ORDER — EMPAGLIFLOZIN 25 MG/1
25 TABLET, FILM COATED ORAL
Refills: 0 | Status: ACTIVE | COMMUNITY

## 2022-10-03 RX ORDER — OXYCODONE 5 MG/1
5 TABLET ORAL
Refills: 0 | Status: ACTIVE | COMMUNITY

## 2022-10-03 RX ORDER — ACETAMINOPHEN 325 MG/1
325 TABLET ORAL
Refills: 0 | Status: ACTIVE | COMMUNITY

## 2022-10-03 RX ORDER — GABAPENTIN 300 MG/1
300 CAPSULE ORAL
Refills: 0 | Status: ACTIVE | COMMUNITY

## 2022-10-03 RX ORDER — FUROSEMIDE 20 MG/1
20 TABLET ORAL
Refills: 0 | Status: ACTIVE | COMMUNITY

## 2022-10-03 RX ORDER — METOPROLOL SUCCINATE 100 MG/1
100 TABLET, EXTENDED RELEASE ORAL
Refills: 0 | Status: ACTIVE | COMMUNITY

## 2022-10-03 RX ORDER — METFORMIN HYDROCHLORIDE 500 MG/1
500 TABLET, COATED ORAL
Refills: 0 | Status: ACTIVE | COMMUNITY

## 2022-10-03 RX ORDER — ALOGLIPTIN 25 MG/1
25 TABLET, FILM COATED ORAL
Refills: 0 | Status: ACTIVE | COMMUNITY

## 2022-10-03 RX ORDER — ATORVASTATIN CALCIUM 40 MG/1
40 TABLET, FILM COATED ORAL
Refills: 0 | Status: ACTIVE | COMMUNITY

## 2022-10-03 RX ORDER — LORATADINE 10 MG
17 TABLET,DISINTEGRATING ORAL
Refills: 0 | Status: ACTIVE | COMMUNITY

## 2022-10-03 NOTE — ASSESSMENT
[FreeTextEntry1] : ASSESSMENT \par \par Patient recovering well at home s/p 3V CABG, accompanied by spouse. Reviewed all medications and dosages with patient understanding. Patient has all medications in home and is taking as prescribed. Pain controlled with current medication regimen. No new symptoms, issues or concerns. Reports ambulating around home with one person assist and rolling walker. Denies chest pain, SOB/MCDOWELL, nausea/vomiting, constipation/diarrhea. \par \par MSI, CT and SVG sites no erythema, purulent exudate or edema noted, edges well approximated, patient denies pain. \par \par SVG: left forearm SVG harvest site ecchymotic to thigh area and on heel, denies pain or tenderness to site. \par \par POST-OP APPOINTMENTS \par \par Deigo Herrera) Thoracic and Cardiac Surgery: Scheduled Appointment: 10/10/2022 02:00 PM \par  \par Laura Carlson) Neurology  Follow Up Time: 1 month \par  \par [facility] RN initiated\par [facility] PT awaiting start of treatment\par \par [facility] team will continue to follow up with patient's status. NP/CCC roles explained with patient understanding, contact information provided. Patient agrees to call with any questions, issues or concerns. Worsening symptoms reviewed with patient with reiteration and understanding.\par \par \par ADDITIONAL INSTRUCTIONS: \par \par Physical therapy will be coming to your home tomorrow to begin your first session of home physical therapy. Please continue to walk with your rolling walker that you were given at discharge. \par \par POST OP PLAN\par \par *Adhere to ADA Consistent Carbohydrate Diabetic Diets. Monitor FBG once daily, on oral antidiabetics not required for QID FBG.\par \par *Do not drive or operate machinery, No heavy lifting/straining, Showering allowed, Stairs allowed, Walking - Indoors allowed, Walking - Outdoors allowed \par \par *Walk daily as tolerated and use your incentive spirometer 10 times every hour while you are awake. Walk aorund the apartment and transition slowly to stairs and outdoors as tolerated, avoid extreme temperatures when outdoors.\par  \par *Please weigh yourself daily. If you notice over a 3 pound weight gain in 3 days, this is a sign you are likely retaining too much fluid. It is imperative you call our right away with unexplained rapid weight gain. A scale was mailed to your home.\par  \par *Please continue to wear the compression stockings given to you in the hospital at home. This is a way to prevent fluid from building up in your legs. You can remove intermittently for breaks or to wash.\par  \par *No driving or strenuous activity/exercise until cleared by your surgeon. \par  \par *Gently clean your incisions with unscented/antibacterial soap and water, pat dry. You may leave them open to air. \par  \par *Call your doctor if you have shortness of breath, chest pain not relieved by pain medication, dizziness, fever >101.5, or increased redness or drainage from incisions. \par \par \par Sternal Precautions\par \par Sternal precautions are used to help protect your sternum (breastbone) after open chest surgery. Wires are placed during surgery to hold the sternum together as it heals. Sternal precautions help prevent the wires from cutting through the sternum. The precautions also help prevent the sternum from coming apart from an injury, and prevent pain and bleeding. You may need to use the precautions for up to 12 weeks after surgery. It is important to follow the instructions carefully. An injury to the healing sternum can be life-threatening.\par \par General sternal precautions: Start slowly and do more as you get stronger. Pain medicine might make it harder for you to know when to slow down or be careful. Stop immediately if you hear a crunch or pop in your sternum.\par \par Protect your sternum. Hug a pillow to your chest or cross your arms over your chest when you laugh, sneeze, or cough.\par \par Be careful when you get into or out of a chair or bed. Hug a pillow or cross your arms when you stand or sit. Do not twist as you move. Use only your legs to sit and stand. You may need to use a raised toilet seat if you have trouble standing up without using your arms. \par \par Ask when you may take a bath or shower. You may need to use a bath chair if you have trouble getting into or out of the tub. Do not use a grab bar.\par \par Do not lift or carry anything heavier than 5 pounds. For example, a gallon of milk weighs 8 pounds.\par \par Keep your arms down as much as possible. Do not put your arms out to the side, behind you, or over your head. Do not let anyone pull your arms to help you move or dress. Do not reach for items.\par \par Do not push or pull anything. Examples include a car door or a vacuum .\par \par Do not drive while you are healing. Your surgeon will tell you when it is safe for you to start driving again.\par \par \par \par

## 2022-10-03 NOTE — PHYSICAL EXAM
[Sclera] : the sclera and conjunctiva were normal [Neck Appearance] : the appearance of the neck was normal [Jugular Venous Distention Increased] : there was no jugular-venous distention [] : no respiratory distress [Respiration, Rhythm And Depth] : normal respiratory rhythm and effort [Exaggerated Use Of Accessory Muscles For Inspiration] : no accessory muscle use [Examination Of The Chest] : the chest was normal in appearance [Chest Visual Inspection Thoracic Asymmetry] : no chest asymmetry [Diminished Respiratory Excursion] : normal chest expansion [Breast Appearance] : normal in appearance [Skin Color & Pigmentation] : normal skin color and pigmentation [Cranial Nerves] : cranial nerves 2-12 were intact [Oriented To Time, Place, And Person] : oriented to person, place, and time [FreeTextEntry1] : TH LIMITED ASSESSMENT

## 2022-10-03 NOTE — REASON FOR VISIT
[Home] : at home, [unfilled] , at the time of the visit. [Other Location: e.g. Home (Enter Location, City,State)___] : at [unfilled] [Spouse] : spouse [Patient] : the patient

## 2022-10-03 NOTE — HISTORY OF PRESENT ILLNESS
[FreeTextEntry1] : [facility]: [facility]\par 24hr discharge call and assessment \par \par Maldonado Bello is a 56 year-old male with a PMHx of HTN, HLD, DM, PAD, CKD, CVA x3 (2013, 2014, 2015) with residual right sided weakness and aphasia, who originally presented to [facility] following an abnormal stress test. During workup, patient had Coronary Catheterization showing pLAD 80% stenosis, Diagonal 1 70% stenosis, Diagonal 2 80% stenosis, Ramus 70% stenosis, OM1 90% stenosis, mRCA 85% stenosis, EF 60%. Patient transferred to [facility] under the care of Dr. Herrera, for CABG.  \par \par On 9/28 he underwent a CABG x3 (LIMA-LAD, SVG-RCA, SVG-Ramus). He recovered in the \par ICU and hemodynamically stable. On POD 2 he became slightly tachycardic in the low 100s, and his Metoprolol was titrated up to 25 mg Q6H which he responded to. Endocrinology has been following and made corrections to his insulin regiment with a glucose goal of 100-180. On POD 3 he began coughing with production and diminished breath sounds. Ultrasound of the bilateral chest showed a trace to small effusion of the left side but a small to moderate (about 500cc) effusion on the right side. Pigtail placed by Dr. Yusuf at bedside with removal of over 500 cc of fluid. CT removed later that day with no issues. Discharged home on 10/2/22, with a 3 day course of Lasix 20 mg, [facility] and PT. \par \par POST OP INCISION ASSESSMENT\par \par MSI, CT and SVG sites, c/d/i, margins well approximated, no erythema or purulent discharge noted, open to air.

## 2022-10-07 ENCOUNTER — APPOINTMENT (OUTPATIENT)
Dept: CARE COORDINATION | Facility: HOME HEALTH | Age: 57
End: 2022-10-07

## 2022-10-07 VITALS
SYSTOLIC BLOOD PRESSURE: 121 MMHG | RESPIRATION RATE: 17 BRPM | HEART RATE: 88 BPM | OXYGEN SATURATION: 98 % | TEMPERATURE: 98 F | DIASTOLIC BLOOD PRESSURE: 67 MMHG

## 2022-10-07 PROCEDURE — 99024 POSTOP FOLLOW-UP VISIT: CPT

## 2022-10-07 RX ORDER — ATORVASTATIN CALCIUM 10 MG/1
10 TABLET, FILM COATED ORAL
Qty: 90 | Refills: 0 | Status: DISCONTINUED | COMMUNITY
Start: 2022-08-29

## 2022-10-07 RX ORDER — PEN NEEDLE, DIABETIC 33 GX5/32"
33G X 4 MM NEEDLE, DISPOSABLE MISCELLANEOUS
Qty: 100 | Refills: 0 | Status: DISCONTINUED | COMMUNITY
Start: 2022-10-02

## 2022-10-07 RX ORDER — BLOOD SUGAR DIAGNOSTIC
STRIP MISCELLANEOUS
Qty: 50 | Refills: 0 | Status: ACTIVE | COMMUNITY
Start: 2022-10-02

## 2022-10-07 RX ORDER — BLOOD-GLUCOSE METER
W/DEVICE KIT MISCELLANEOUS
Qty: 1 | Refills: 0 | Status: ACTIVE | COMMUNITY
Start: 2022-09-01

## 2022-10-07 RX ORDER — ALCOHOL ANTISEPTIC PADS
PADS, MEDICATED (EA) TOPICAL
Qty: 100 | Refills: 0 | Status: ACTIVE | COMMUNITY
Start: 2022-10-02

## 2022-10-07 RX ORDER — CEPHALEXIN 500 MG/1
500 TABLET ORAL
Qty: 28 | Refills: 0 | Status: DISCONTINUED | COMMUNITY
Start: 2022-08-05

## 2022-10-07 RX ORDER — METOPROLOL SUCCINATE 25 MG/1
25 TABLET, EXTENDED RELEASE ORAL
Qty: 90 | Refills: 0 | Status: DISCONTINUED | COMMUNITY
Start: 2022-08-29

## 2022-10-07 RX ORDER — MUPIROCIN 20 MG/G
2 OINTMENT TOPICAL
Qty: 22 | Refills: 0 | Status: DISCONTINUED | COMMUNITY
Start: 2022-08-05

## 2022-10-07 RX ORDER — PEN NEEDLE, DIABETIC 32GX 5/32"
NEEDLE, DISPOSABLE MISCELLANEOUS
Qty: 100 | Refills: 0 | Status: ACTIVE | COMMUNITY
Start: 2022-10-02

## 2022-10-07 RX ORDER — MELOXICAM 15 MG/1
15 TABLET ORAL
Qty: 10 | Refills: 0 | Status: DISCONTINUED | COMMUNITY
Start: 2022-08-29

## 2022-10-07 RX ORDER — CLOTRIMAZOLE 10 MG/ML
1 SOLUTION TOPICAL
Qty: 30 | Refills: 0 | Status: DISCONTINUED | COMMUNITY
Start: 2022-08-29

## 2022-10-07 RX ORDER — HYDROCORTISONE 1 %
12 CREAM (GRAM) TOPICAL
Qty: 400 | Refills: 0 | Status: DISCONTINUED | COMMUNITY
Start: 2022-10-02

## 2022-10-07 RX ORDER — LOSARTAN POTASSIUM 25 MG/1
25 TABLET, FILM COATED ORAL
Qty: 90 | Refills: 0 | Status: DISCONTINUED | COMMUNITY
Start: 2022-08-29

## 2022-10-07 RX ORDER — CEPHALEXIN 500 MG/1
500 CAPSULE ORAL
Qty: 20 | Refills: 0 | Status: ACTIVE | COMMUNITY
Start: 2022-10-07 | End: 1900-01-01

## 2022-10-07 RX ORDER — POLYETHYLENE GLYCOL 3350 17 G/17G
17 POWDER, FOR SOLUTION ORAL
Qty: 238 | Refills: 0 | Status: DISCONTINUED | COMMUNITY
Start: 2022-10-02

## 2022-10-07 RX ORDER — HUMAN INSULIN 100 [IU]/ML
(70-30) 100 INJECTION, SUSPENSION SUBCUTANEOUS
Qty: 15 | Refills: 0 | Status: DISCONTINUED | COMMUNITY
Start: 2022-10-02

## 2022-10-07 RX ORDER — DICLOFENAC SODIUM 1% 10 MG/G
1 GEL TOPICAL
Qty: 100 | Refills: 0 | Status: DISCONTINUED | COMMUNITY
Start: 2022-08-29

## 2022-10-07 NOTE — PHYSICAL EXAM
[Sclera] : the sclera and conjunctiva were normal [PERRL With Normal Accommodation] : pupils were equal in size, round, and reactive to light [Extraocular Movements] : extraocular movements were intact [Neck Appearance] : the appearance of the neck was normal [Neck Cervical Mass (___cm)] : no neck mass was observed [Jugular Venous Distention Increased] : there was no jugular-venous distention [Thyroid Diffuse Enlargement] : the thyroid was not enlarged [Thyroid Nodule] : there were no palpable thyroid nodules [Respiration, Rhythm And Depth] : normal respiratory rhythm and effort [Exaggerated Use Of Accessory Muscles For Inspiration] : no accessory muscle use [Auscultation Breath Sounds / Voice Sounds] : lungs were clear to auscultation bilaterally [Heart Rate And Rhythm] : heart rate was normal and rhythm regular [Heart Sounds] : normal S1 and S2 [Heart Sounds Gallop] : no gallops [Murmurs] : no murmurs [Heart Sounds Pericardial Friction Rub] : no pericardial rub [Examination Of The Chest] : the chest was normal in appearance [Chest Visual Inspection Thoracic Asymmetry] : no chest asymmetry [Diminished Respiratory Excursion] : normal chest expansion [2+] : left 2+ [No Abnormalities] : the abdominal aorta was not enlarged and no bruit was heard [Breast Appearance] : normal in appearance [Breast Palpation Mass] : no palpable masses [Bowel Sounds] : normal bowel sounds [Abdomen Soft] : soft [Abdomen Tenderness] : non-tender [] : no hepato-splenomegaly [Abdomen Mass (___ Cm)] : no abdominal mass palpated [Cervical Lymph Nodes Enlarged Posterior Bilaterally] : posterior cervical [Cervical Lymph Nodes Enlarged Anterior Bilaterally] : anterior cervical [Supraclavicular Lymph Nodes Enlarged Bilaterally] : supraclavicular [Axillary Lymph Nodes Enlarged Bilaterally] : axillary [Deep Tendon Reflexes (DTR)] : deep tendon reflexes were 2+ and symmetric [Sensation] : the sensory exam was normal to light touch and pinprick [No Focal Deficits] : no focal deficits [Oriented To Time, Place, And Person] : oriented to person, place, and time [Impaired Insight] : insight and judgment were intact [Affect] : the affect was normal [Right Carotid Bruit] : no bruit heard over the right carotid [Left Carotid Bruit] : no bruit heard over the left carotid [Right Femoral Bruit] : no bruit heard over the right femoral artery [Left Femoral Bruit] : no bruit heard over the left femoral artery [FreeTextEntry1] : msi c/d/i, sternum stable margins well approximated, left SVG site c/di open to air, left radial graft site x 2, distal site c/d/i, proximal site mild erythema, edematous, warm with pain on light palpation.

## 2022-10-07 NOTE — HISTORY OF PRESENT ILLNESS
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 During workup, patient had Coronary Catheterization showing pLAD 80% stenosis, Diagonal 1 70% stenosis, Diagonal 2 80% stenosis, Ramus 70% stenosis, OM1 90% stenosis, mRCA 85% stenosis, EF 60%. Patient transferred to [facility] under the care of Dr. Herrera, for CABG.  \par \par On 9/28 he underwent a CABG x3 (LIMA-LAD, SVG-RCA, SVG-Ramus). He recovered in the \par ICU and hemodynamically stable. On POD 2 he became slightly tachycardic in the low 100s, and his Metoprolol was titrated up to 25 mg Q6H which he responded to. Endocrinology has been following and made corrections to his insulin regiment with a glucose goal of 100-180. On POD 3 he began coughing with production and diminished breath sounds. Ultrasound of the bilateral chest showed a trace to small effusion of the left side but a small to moderate (about 500cc) effusion on the right side. Pigtail placed by Dr. Yusuf at bedside with removal of over 500 cc of fluid. CT removed later that day with no issues. He was discharged with a 3 day course of Lasix 20 mg and at time of his follow up appointment can deem if needed for long term.  \par \par POST OP INCISION ASSESSMENT\par \par MSI, CT and left leg SVG sites no erythema, purulent exudate or edema noted, edges well approximated, patient denies pain. \par \par Right radial graft site x2 proximal site, moderate erythema, warm to touch, edematous tenderness with light palpation, cap refill <2, no loss of sensation or cyanosis, b/l radial pulses 2+\par

## 2022-10-07 NOTE — REVIEW OF SYSTEMS
[Lower Ext Edema] : lower extremity edema [As Noted in HPI] : as noted in HPI [Negative] : Heme/Lymph [FreeTextEntry5] : f

## 2022-10-07 NOTE — ASSESSMENT
[FreeTextEntry1] : ASSESSMENT \par \par Patient recovering well at home s/p CABG, accompanied by partner. Reviewed all medications and dosages with patient understanding. Patient has all medications in home and is taking as prescribed. Pain controlled with current medication regimen. No new symptoms, issues or concerns. Reports ambulating around home independently, without the use if assistive device. Denies chest pain, SOB/MCDOWELL, nausea/vomiting, constipation/diarrhea. \par \par POST OP INCISION ASSESSMENT\par \par MSI, CT and left leg SVG sites no erythema, purulent exudate or edema noted, edges well approximated, patient denies pain. \par \par Right radial graft site x2 proximal site, moderate erythema, warm to touch, edematous tenderness with light palpation, cap refill <2, no loss of sensation or cyanosis, b/l radial pulses 2+\par \par \par \par POST-OP APPOINTMENTS \par \par Diego Herrera) Thoracic and Cardiac Surgery: Scheduled Appointment: 10/10/2022 02:00 PM \par  \par Laura Carlson) Neurology  Follow Up Time: 1 month \par Cardiology; instructed to call for follow-up appt with 2 wks of discharge\par Endocrinology: call to get appt within 1 month of discharge\par Vascular; Call to schedule appt within 2-3 weeks of discharge\par  \par  \par ADDITIONAL INSTRUCTIONS: \par START Lasix 20mg Q7D with Potassium 10MEQ, at POA your surgical team will discuss continuing.\par \par START antibiotics Keflex 500mg BID q10D for mild cellulitis of left proximal radial graft site.\par \par As discussed get a scale to weigh daily, same time same clothes.Record weights in a log.\par \par Physical therapy will be coming to your home tomorrow to begin your first session of home physical therapy. \par \par Please continue to walk with your rolling walker that you were given at discharge. \par \par POST OP PLAN\par \par *Adhere to ADA Consistent Carbohydrate Diabetic Diets. Monitor FBG once daily, on oral antidiabetics not required for QID FBG.\par \par *Do not drive or operate machinery, No heavy lifting/straining, Showering allowed, Stairs allowed, Walking - Indoors allowed, Walking - Outdoors allowed \par \par *Walk daily as tolerated and use your incentive spirometer 10 times every hour while you are awake. Walk around the apartment and transition slowly to stairs and outdoors as tolerated, avoid extreme temperatures when outdoors.\par  \par *Please weigh yourself daily. If you notice over a 3 pound weight gain in 3 days, this is a sign you are likely retaining too much fluid. It is imperative you call our right away with unexplained rapid weight gain. A scale was mailed to your home.\par  \par *Please continue to wear the compression stockings given to you in the hospital at home. This is a way to prevent fluid from building up in your legs. You can remove intermittently for breaks or to wash.\par  \par *No driving or strenuous activity/exercise until cleared by your surgeon. \par  \par *Gently clean your incisions with unscented/antibacterial soap and water, pat dry. You may leave them open to air. \par  \par *Call your doctor if you have shortness of breath, chest pain not relieved by pain medication, dizziness, fever >101.5, or increased redness or drainage from incisions. \par \par Sternal Precautions\par \par Sternal precautions are used to help protect your sternum (breastbone) after open chest surgery. Wires are placed during surgery to hold the sternum together as it heals. Sternal precautions help prevent the wires from cutting through the sternum. The precautions also help prevent the sternum from coming apart from an injury, and prevent pain and bleeding. You may need to use the precautions for up to 12 weeks after surgery. It is important to follow the instructions carefully. An injury to the healing sternum can be life-threatening.\par \par General sternal precautions: Start slowly and do more as you get stronger. Pain medicine might make it harder for you to know when to slow down or be careful. Stop immediately if you hear a crunch or pop in your sternum.\par \par Protect your sternum. Hug a pillow to your chest or cross your arms over your chest when you laugh, sneeze, or cough.\par \par Be careful when you get into or out of a chair or bed. Hug a pillow or cross your arms when you stand or sit. Do not twist as you move. Use only your legs to sit and stand. You may need to use a raised toilet seat if you have trouble standing up without using your arms. \par \par Ask when you may take a bath or shower. You may need to use a bath chair if you have trouble getting into or out of the tub. Do not use a grab bar.\par \par Do not lift or carry anything heavier than 5 pounds. For example, a gallon of milk weighs 8 pounds.\par \par Keep your arms down as much as possible. Do not put your arms out to the side, behind you, or over your head. Do not let anyone pull your arms to help you move or dress. Do not reach for items.\par \par Do not push or pull anything. Examples include a car door or a vacuum .\par \par Do not drive while you are healing. Your surgeon will tell you when it is safe for you to start driving again.\par \par \par

## 2022-10-10 ENCOUNTER — APPOINTMENT (OUTPATIENT)
Dept: CARDIOTHORACIC SURGERY | Facility: CLINIC | Age: 57
End: 2022-10-10

## 2022-10-10 ENCOUNTER — OUTPATIENT (OUTPATIENT)
Dept: OUTPATIENT SERVICES | Facility: HOSPITAL | Age: 57
LOS: 1 days | End: 2022-10-10
Payer: COMMERCIAL

## 2022-10-10 VITALS
HEART RATE: 82 BPM | OXYGEN SATURATION: 97 % | TEMPERATURE: 98.6 F | WEIGHT: 170 LBS | DIASTOLIC BLOOD PRESSURE: 60 MMHG | RESPIRATION RATE: 17 BRPM | HEIGHT: 65 IN | SYSTOLIC BLOOD PRESSURE: 111 MMHG | BODY MASS INDEX: 28.32 KG/M2

## 2022-10-10 DIAGNOSIS — I25.10 ATHEROSCLEROTIC HEART DISEASE OF NATIVE CORONARY ARTERY W/OUT ANGINA PECTORIS: ICD-10-CM

## 2022-10-10 PROCEDURE — 99024 POSTOP FOLLOW-UP VISIT: CPT

## 2022-10-10 PROCEDURE — 71046 X-RAY EXAM CHEST 2 VIEWS: CPT | Mod: 26

## 2022-10-10 PROCEDURE — 71046 X-RAY EXAM CHEST 2 VIEWS: CPT

## 2022-10-11 PROBLEM — I25.10 CAD (CORONARY ARTERY DISEASE): Status: ACTIVE | Noted: 2022-10-03

## 2022-10-14 ENCOUNTER — NON-APPOINTMENT (OUTPATIENT)
Age: 57
End: 2022-10-14

## 2022-10-14 DIAGNOSIS — I25.10 ATHEROSCLEROTIC HEART DISEASE OF NATIVE CORONARY ARTERY WITHOUT ANGINA PECTORIS: ICD-10-CM

## 2022-10-14 DIAGNOSIS — E11.51 TYPE 2 DIABETES MELLITUS WITH DIABETIC PERIPHERAL ANGIOPATHY WITHOUT GANGRENE: ICD-10-CM

## 2022-10-14 DIAGNOSIS — R00.0 TACHYCARDIA, UNSPECIFIED: ICD-10-CM

## 2022-10-14 DIAGNOSIS — E78.5 HYPERLIPIDEMIA, UNSPECIFIED: ICD-10-CM

## 2022-10-14 DIAGNOSIS — I34.0 NONRHEUMATIC MITRAL (VALVE) INSUFFICIENCY: ICD-10-CM

## 2022-10-14 DIAGNOSIS — Z79.84 LONG TERM (CURRENT) USE OF ORAL HYPOGLYCEMIC DRUGS: ICD-10-CM

## 2022-10-14 DIAGNOSIS — I69.351 HEMIPLEGIA AND HEMIPARESIS FOLLOWING CEREBRAL INFARCTION AFFECTING RIGHT DOMINANT SIDE: ICD-10-CM

## 2022-10-14 DIAGNOSIS — E11.22 TYPE 2 DIABETES MELLITUS WITH DIABETIC CHRONIC KIDNEY DISEASE: ICD-10-CM

## 2022-10-14 DIAGNOSIS — E11.40 TYPE 2 DIABETES MELLITUS WITH DIABETIC NEUROPATHY, UNSPECIFIED: ICD-10-CM

## 2022-10-14 DIAGNOSIS — E11.319 TYPE 2 DIABETES MELLITUS WITH UNSPECIFIED DIABETIC RETINOPATHY WITHOUT MACULAR EDEMA: ICD-10-CM

## 2022-10-14 DIAGNOSIS — E11.65 TYPE 2 DIABETES MELLITUS WITH HYPERGLYCEMIA: ICD-10-CM

## 2022-10-14 DIAGNOSIS — I12.9 HYPERTENSIVE CHRONIC KIDNEY DISEASE WITH STAGE 1 THROUGH STAGE 4 CHRONIC KIDNEY DISEASE, OR UNSPECIFIED CHRONIC KIDNEY DISEASE: ICD-10-CM

## 2022-10-14 DIAGNOSIS — J90 PLEURAL EFFUSION, NOT ELSEWHERE CLASSIFIED: ICD-10-CM

## 2022-10-14 DIAGNOSIS — I69.320 APHASIA FOLLOWING CEREBRAL INFARCTION: ICD-10-CM

## 2022-10-14 DIAGNOSIS — Z79.82 LONG TERM (CURRENT) USE OF ASPIRIN: ICD-10-CM

## 2022-10-14 DIAGNOSIS — Z20.822 CONTACT WITH AND (SUSPECTED) EXPOSURE TO COVID-19: ICD-10-CM

## 2022-10-14 DIAGNOSIS — Z91.013 ALLERGY TO SEAFOOD: ICD-10-CM

## 2022-10-14 DIAGNOSIS — I25.118 ATHEROSCLEROTIC HEART DISEASE OF NATIVE CORONARY ARTERY WITH OTHER FORMS OF ANGINA PECTORIS: ICD-10-CM

## 2022-10-14 DIAGNOSIS — E03.9 HYPOTHYROIDISM, UNSPECIFIED: ICD-10-CM

## 2022-10-14 DIAGNOSIS — I95.9 HYPOTENSION, UNSPECIFIED: ICD-10-CM

## 2022-10-14 DIAGNOSIS — N18.9 CHRONIC KIDNEY DISEASE, UNSPECIFIED: ICD-10-CM

## 2022-10-14 DIAGNOSIS — Z87.891 PERSONAL HISTORY OF NICOTINE DEPENDENCE: ICD-10-CM

## 2022-10-14 DIAGNOSIS — Z79.4 LONG TERM (CURRENT) USE OF INSULIN: ICD-10-CM

## 2022-10-22 DIAGNOSIS — I10 ESSENTIAL (PRIMARY) HYPERTENSION: ICD-10-CM

## 2022-10-22 DIAGNOSIS — E78.5 HYPERLIPIDEMIA, UNSPECIFIED: ICD-10-CM

## 2022-10-22 DIAGNOSIS — N18.9 CHRONIC KIDNEY DISEASE, UNSPECIFIED: ICD-10-CM

## 2022-10-22 DIAGNOSIS — I63.9 CEREBRAL INFARCTION, UNSPECIFIED: ICD-10-CM

## 2022-10-22 DIAGNOSIS — E11.9 TYPE 2 DIABETES MELLITUS W/OUT COMPLICATIONS: ICD-10-CM

## 2022-10-22 DIAGNOSIS — I73.9 PERIPHERAL VASCULAR DISEASE, UNSPECIFIED: ICD-10-CM

## 2022-10-24 ENCOUNTER — NON-APPOINTMENT (OUTPATIENT)
Age: 57
End: 2022-10-24

## 2022-10-24 ENCOUNTER — APPOINTMENT (OUTPATIENT)
Dept: CARDIOTHORACIC SURGERY | Facility: CLINIC | Age: 57
End: 2022-10-24

## 2022-10-24 VITALS
DIASTOLIC BLOOD PRESSURE: 56 MMHG | OXYGEN SATURATION: 98 % | HEART RATE: 88 BPM | HEIGHT: 65 IN | TEMPERATURE: 99.1 F | WEIGHT: 165 LBS | BODY MASS INDEX: 27.49 KG/M2 | RESPIRATION RATE: 16 BRPM | SYSTOLIC BLOOD PRESSURE: 114 MMHG

## 2022-10-24 DIAGNOSIS — Z09 ENCOUNTER FOR FOLLOW-UP EXAMINATION AFTER COMPLETED TREATMENT FOR CONDITIONS OTHER THAN MALIGNANT NEOPLASM: ICD-10-CM

## 2022-10-24 DIAGNOSIS — Z95.1 PRESENCE OF AORTOCORONARY BYPASS GRAFT: ICD-10-CM

## 2022-10-24 DIAGNOSIS — Z98.890 OTHER SPECIFIED POSTPROCEDURAL STATES: ICD-10-CM

## 2022-10-24 PROCEDURE — 99024 POSTOP FOLLOW-UP VISIT: CPT

## 2022-10-24 RX ORDER — POTASSIUM CHLORIDE 750 MG/1
10 TABLET, FILM COATED, EXTENDED RELEASE ORAL DAILY
Qty: 15 | Refills: 0 | Status: ACTIVE | COMMUNITY
Start: 2022-10-07 | End: 1900-01-01

## 2022-10-25 PROBLEM — Z95.1 S/P CABG X 3: Status: ACTIVE | Noted: 2022-10-03

## 2022-10-25 PROBLEM — Z09 POSTOP CHECK: Status: ACTIVE | Noted: 2022-10-13

## 2022-10-25 PROBLEM — Z98.890 POST-OPERATIVE STATE: Status: ACTIVE | Noted: 2022-10-03

## 2022-11-01 ENCOUNTER — TRANSCRIPTION ENCOUNTER (OUTPATIENT)
Age: 57
End: 2022-11-01

## 2022-12-16 NOTE — CONSULT NOTE ADULT - ASSESSMENT
56y Male with PMHx of HTN, HLD, DM, PAD, CKD, CVA x3 (2013, 2014, 2015) with residual right sided weakness, who originally present to [facility] following an abnormal stress test, now awaiting CABG next week 2/2 multivessel CAD. Stroke team consulted due to patient's prior history of CVA and for preop neurological exam.    - continue ASA 81mg and Atorvastatin 10mg QD (LDL 59)  - monitor for new or worsening deficits preoperatively; q4h stroke neuro checks while on 9LA  - no specific contraindications for CABG from stroke team perspective; would avoid hypotension during procedure    Stroke team will continue to follow.    Case discussed with Neurology Fellow Dr. Cardenas who will discuss with Neurology Attending Dr. Laura Carlson 56y Male with PMHx of HTN, HLD, DM, PAD, CKD, CVA x3 (2013, 2014, 2015) with residual right sided weakness, who originally present to [facility] following an abnormal stress test, now awaiting CABG next week 2/2 multivessel CAD. Stroke team consulted due to patient's prior history of CVA and for preop neurological exam.    - continue ASA 81mg and Atorvastatin 10mg QD (LDL 59)  - monitor for new or worsening deficits preoperatively; q4h stroke neuro checks while on 9LA  - no specific contraindications for CABG from stroke team perspective; would avoid hypotension during procedure  - should have outpatient stroke follow up for evaluation of hypercoagulable state, however, patient's risk factors most likely contributing to patient's strokes    Stroke team will continue to follow.    Case discussed with Neurology Fellow Dr. Cardenas who will discuss with Neurology Attending Dr. Laura Carlson 16

## 2023-01-27 ENCOUNTER — NON-APPOINTMENT (OUTPATIENT)
Age: 58
End: 2023-01-27

## 2023-04-12 NOTE — CONSULT NOTE ADULT - CONSULT REASON
Pre-op assessment
uncontrolled T2DM with hyperglycemia
Diabetes management
No Residual Tumor Seen Histology Text: There were no malignant cells seen in the sections examined.

## 2023-06-05 VITALS
WEIGHT: 164.02 LBS | RESPIRATION RATE: 18 BRPM | DIASTOLIC BLOOD PRESSURE: 75 MMHG | SYSTOLIC BLOOD PRESSURE: 122 MMHG | OXYGEN SATURATION: 98 % | TEMPERATURE: 97 F | HEIGHT: 68 IN | HEART RATE: 64 BPM

## 2023-06-05 NOTE — H&P ADULT - NSICDXPASTSURGICALHX_GEN_ALL_CORE_FT
Per pt she just called complaining of having possibly a uti, she feels pressure, she sees blood but not sure if it is because she had the baby or from the other end, she also thinks it might be because of the antibiotics she is taking for her previous prob PAST SURGICAL HISTORY:  No significant past surgical history

## 2023-06-05 NOTE — H&P ADULT - NSHPLABSRESULTS_GEN_ALL_CORE
12.7   6.61  )-----------( 241      ( 09 Jun 2023 08:02 )             41.7       06-09    137  |  104  |  23  ----------------------------<  162<H>  4.7   |  20<L>  |  1.42<H>    Ca    8.4      09 Jun 2023 08:03  Mg     2.0     06-09    TPro  7.5  /  Alb  4.0  /  TBili  0.5  /  DBili  x   /  AST  25  /  ALT  22  /  AlkPhos  55  06-09      PT/INR - ( 09 Jun 2023 08:02 )   PT: 12.1 sec;   INR: 1.02          PTT - ( 09 Jun 2023 08:02 )  PTT:30.4 sec    CARDIAC MARKERS ( 09 Jun 2023 08:02 )  x     / x     / 160 U/L / x     / x                EKG:

## 2023-06-05 NOTE — H&P ADULT - CAROTID PULSE
Referral updated   right normal/left normal Drysol Counseling:  I discussed with the patient the risks of drysol/aluminum chloride including but not limited to skin rash, itching, irritation, burning.

## 2023-06-05 NOTE — H&P ADULT - HISTORY OF PRESENT ILLNESS
CARDIOLOGIST: Dr. Madison  PHARMACY:     Pt is 58yo Male w/ PMHx of HTN, HLD, CKD, DM T2, PAD, CVA x3 (2013/2014/2015 w/ residual R sided weakness and aphasia), CAD w/ 3VCABG (LIMA-LAD, SVG-RCA, SVG-Ramus) who presented to outpatient cardiologist, Dr. Madison, endorsing pain in bilateral lower extremities w/ ambulation. Pt cannot walk more than 2 cit blocks. Pt also endorses LE edema in B/L ankles. Pt denies CP, SOB, palpitations, dizziness, orthopnea, PND, abdominal pain, N/V, fever/chills.     TTE (9/2022): LVEF 55-60%, no significant valvular disease, PASP 23mmHg.   Lower Extremity Arterial Duplex (per MD note): R side severe arterial insufficiency due to fem-pop and infrapopliteal disease; L sided severe arterial insufficiency due to iliac, fem-pop, and infrapopliteal disease.     In light of patient's risk factors, [place] ____ claudication symptoms, and abnormal lower extremity arterial duplex, patient now presents to [facility] for peripheral angiogram with possible intervention if clinically indicated.  CARDIOLOGIST: Dr. Madison  PHARMACY:     Pt is 58yo Male w/ PMHx of HTN, HLD, CKD, DM T2, PAD, CVA x3 (2013/2014/2015 w/ residual R sided weakness and aphasia), CAD w/ 3VCABG (LIMA-LAD, SVG-RCA, SVG-Ramus) who presented to outpatient cardiologist, Dr. Madison, endorsing pain in bilateral lower extremities w/ ambulation. Pt cannot walk more than 2 cit blocks. Pt also endorses LE edema in B/L ankles. Pt denies CP, SOB, palpitations, dizziness, orthopnea, PND, abdominal pain, N/V, fever/chills.     TTE (9/2022): LVEF 55-60%, no significant valvular disease, PASP 23mmHg.   Lower Extremity Arterial Duplex (per MD note): R side severe arterial insufficiency due to fem-pop and infrapopliteal disease; L sided severe arterial insufficiency due to iliac, fem-pop, and infrapopliteal disease.     In light of patient's risk factors, [place] class III claudication symptoms, and abnormal lower extremity arterial duplex, patient now presents to [facility] for peripheral angiogram with possible intervention if clinically indicated.  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 Madison, endorsing pain in bilateral lower extremities w/ ambulation. Pt cannot walk more than 2 cit blocks. Pt also endorses LE edema in B/L ankles. Pt denies CP, SOB, palpitations, dizziness, orthopnea, PND, abdominal pain, N/V, fever/chills.     TTE (9/2022): LVEF 55-60%, no significant valvular disease, PASP 23mmHg.   Lower Extremity Arterial Duplex (per MD note): R side severe arterial insufficiency due to fem-pop and infrapopliteal disease; L sided severe arterial insufficiency due to iliac, fem-pop, and infrapopliteal disease.     In light of patient's risk factors, [place] class III claudication symptoms, and abnormal lower extremity arterial duplex, patient now presents to [facility] for peripheral angiogram with possible intervention if clinically indicated.

## 2023-06-05 NOTE — H&P ADULT - ASSESSMENT
Pt is 56yo Male w/ PMHx of HTN, HLD, CKD, DM T2, PAD, CVA x3 (2013/2014/2015 w/ residual R sided weakness and aphasia), CAD w/ 3VCABG (LIMA-LAD, SVG-RCA, SVG-Ramus) who presented to outpatient cardiologist, Dr. Madison, endorsing pain in bilateral lower extremities w/ ambulation. Pt cannot walk more than 2 cit blocks. Pt also endorses LE edema in B/L ankles. Pt denies CP, SOB, palpitations, dizziness, orthopnea, PND, abdominal pain, N/V, fever/chills.     TTE (9/2022): LVEF 55-60%, no significant valvular disease, PASP 23mmHg.   Lower Extremity Arterial Duplex (per MD note): R side severe arterial insufficiency due to fem-pop and infrapopliteal disease; L sided severe arterial insufficiency due to iliac, fem-pop, and infrapopliteal disease.     In light of patient's risk factors, [place] class III claudication symptoms, and abnormal lower extremity arterial duplex, patient now presents to [facility] for peripheral angiogram with possible intervention if clinically indicated.     ASA:  III  Mallampati class: II	  Anginal Class: ****    Allergy Status: NKDA/NKFA  EKG: NSR***  LOAD:  H/H WNL.  Pt denies BRBPR, hematuria, hematochezia, melena. Pt given ASA:  and Plavix:     LABS:  Reviewed:  BUN/Cr : WNL.  EF****. Euvolemic on exam. IV NS @ *****started pre procedure    Sedation Plan:  Moderate    Patient Is Suitable Candidate For Sedation: Yes        Risks & benefits of procedure and sedation and risks and benefits for the alternative therapy have been explained to the patient in layman’s terms including but not limited to: allergic reaction, bleeding, infection, arrhythmia, respiratory compromise, renal and vascular compromise, limb damage, MI, CVA, emergent CABG/Vascular Surgery and death. Informed consent obtained and in chart.   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 Madison c/o Class III [place] sxs and found with subsequent abnormal LE Duplex.   Given  patient's risk factors, [place] class III claudication symptoms, and abnormal lower extremity arterial duplex, patient now presents to [facility] for peripheral angiogram with possible intervention if clinically indicated with Dr Madison.     ASA:  III  Mallampati class: II	  Anginal Class: II    Allergy Status: Shellfish Crab, Rxn:  Itching/hives, NKFA. Benadryl ordered to be given on cath lab table  EKG: NSR 63 bpm, no acute ischemic changes  LOAD:  H/H WNL.  Pt denies BRBPR, hematuria, hematochezia, melena. Pt given home ASA 81mg daily and Plavix 600mg po X 1 dose  LABS:  Reviewed:  BUN/Cr: 23/1.4, EF Preserved.  Euvolemic on exam. IV NS total 500cc and NS @ 125cc x 4h started pre procedure    Sedation Plan:  Moderate    Patient Is Suitable Candidate For Sedation: Yes        Risks & benefits of procedure and sedation and risks and benefits for the alternative therapy have been explained to the patient in layman’s terms including but not limited to: allergic reaction, bleeding, infection, arrhythmia, respiratory compromise, renal and vascular compromise, limb damage, MI, CVA, emergent CABG/Vascular Surgery and death. Informed consent obtained and in chart.

## 2023-06-09 ENCOUNTER — INPATIENT (INPATIENT)
Facility: HOSPITAL | Age: 58
LOS: 1 days | Discharge: HOME | DRG: 253 | End: 2023-06-11
Attending: INTERNAL MEDICINE | Admitting: INTERNAL MEDICINE
Payer: MEDICAID

## 2023-06-09 LAB
A1C WITH ESTIMATED AVERAGE GLUCOSE RESULT: 7.4 % — HIGH (ref 4–5.6)
ALBUMIN SERPL ELPH-MCNC: 3.8 G/DL — SIGNIFICANT CHANGE UP (ref 3.3–5)
ALBUMIN SERPL ELPH-MCNC: 4 G/DL — SIGNIFICANT CHANGE UP (ref 3.3–5)
ALP SERPL-CCNC: 55 U/L — SIGNIFICANT CHANGE UP (ref 40–120)
ALP SERPL-CCNC: 57 U/L — SIGNIFICANT CHANGE UP (ref 40–120)
ALT FLD-CCNC: 22 U/L — SIGNIFICANT CHANGE UP (ref 10–45)
ALT FLD-CCNC: 22 U/L — SIGNIFICANT CHANGE UP (ref 10–45)
ANION GAP SERPL CALC-SCNC: 11 MMOL/L — SIGNIFICANT CHANGE UP (ref 5–17)
ANION GAP SERPL CALC-SCNC: 13 MMOL/L — SIGNIFICANT CHANGE UP (ref 5–17)
ANISOCYTOSIS BLD QL: SLIGHT — SIGNIFICANT CHANGE UP
APTT BLD: 30.4 SEC — SIGNIFICANT CHANGE UP (ref 27.5–35.5)
APTT BLD: 74.3 SEC — HIGH (ref 27.5–35.5)
AST SERPL-CCNC: 23 U/L — SIGNIFICANT CHANGE UP (ref 10–40)
AST SERPL-CCNC: 25 U/L — SIGNIFICANT CHANGE UP (ref 10–40)
BASOPHILS # BLD AUTO: 0.06 K/UL — SIGNIFICANT CHANGE UP (ref 0–0.2)
BASOPHILS NFR BLD AUTO: 0.9 % — SIGNIFICANT CHANGE UP (ref 0–2)
BILIRUB SERPL-MCNC: 0.5 MG/DL — SIGNIFICANT CHANGE UP (ref 0.2–1.2)
BILIRUB SERPL-MCNC: 0.6 MG/DL — SIGNIFICANT CHANGE UP (ref 0.2–1.2)
BLD GP AB SCN SERPL QL: NEGATIVE — SIGNIFICANT CHANGE UP
BLD GP AB SCN SERPL QL: NEGATIVE — SIGNIFICANT CHANGE UP
BUN SERPL-MCNC: 21 MG/DL — SIGNIFICANT CHANGE UP (ref 7–23)
BUN SERPL-MCNC: 23 MG/DL — SIGNIFICANT CHANGE UP (ref 7–23)
BURR CELLS BLD QL SMEAR: PRESENT — SIGNIFICANT CHANGE UP
CALCIUM SERPL-MCNC: 8.4 MG/DL — SIGNIFICANT CHANGE UP (ref 8.4–10.5)
CALCIUM SERPL-MCNC: 8.7 MG/DL — SIGNIFICANT CHANGE UP (ref 8.4–10.5)
CHLORIDE SERPL-SCNC: 104 MMOL/L — SIGNIFICANT CHANGE UP (ref 96–108)
CHLORIDE SERPL-SCNC: 108 MMOL/L — SIGNIFICANT CHANGE UP (ref 96–108)
CHOLEST SERPL-MCNC: 121 MG/DL — SIGNIFICANT CHANGE UP
CK MB CFR SERPL CALC: 4.9 NG/ML — SIGNIFICANT CHANGE UP (ref 0–6.7)
CK SERPL-CCNC: 160 U/L — SIGNIFICANT CHANGE UP (ref 30–200)
CO2 SERPL-SCNC: 20 MMOL/L — LOW (ref 22–31)
CO2 SERPL-SCNC: 22 MMOL/L — SIGNIFICANT CHANGE UP (ref 22–31)
CREAT SERPL-MCNC: 1.42 MG/DL — HIGH (ref 0.5–1.3)
CREAT SERPL-MCNC: 1.43 MG/DL — HIGH (ref 0.5–1.3)
EGFR: 57 ML/MIN/1.73M2 — LOW
EGFR: 58 ML/MIN/1.73M2 — LOW
EOSINOPHIL # BLD AUTO: 0.29 K/UL — SIGNIFICANT CHANGE UP (ref 0–0.5)
EOSINOPHIL NFR BLD AUTO: 4.4 % — SIGNIFICANT CHANGE UP (ref 0–6)
ESTIMATED AVERAGE GLUCOSE: 166 MG/DL — HIGH (ref 68–114)
GIANT PLATELETS BLD QL SMEAR: PRESENT — SIGNIFICANT CHANGE UP
GLUCOSE BLDC GLUCOMTR-MCNC: 107 MG/DL — HIGH (ref 70–99)
GLUCOSE BLDC GLUCOMTR-MCNC: 114 MG/DL — HIGH (ref 70–99)
GLUCOSE BLDC GLUCOMTR-MCNC: 134 MG/DL — HIGH (ref 70–99)
GLUCOSE BLDC GLUCOMTR-MCNC: 147 MG/DL — HIGH (ref 70–99)
GLUCOSE SERPL-MCNC: 121 MG/DL — HIGH (ref 70–99)
GLUCOSE SERPL-MCNC: 162 MG/DL — HIGH (ref 70–99)
HCT VFR BLD CALC: 40.9 % — SIGNIFICANT CHANGE UP (ref 39–50)
HCT VFR BLD CALC: 41.7 % — SIGNIFICANT CHANGE UP (ref 39–50)
HDLC SERPL-MCNC: 42 MG/DL — SIGNIFICANT CHANGE UP
HGB BLD-MCNC: 12.7 G/DL — LOW (ref 13–17)
HGB BLD-MCNC: 12.9 G/DL — LOW (ref 13–17)
HYPOCHROMIA BLD QL: SLIGHT — SIGNIFICANT CHANGE UP
INR BLD: 1.02 — SIGNIFICANT CHANGE UP (ref 0.88–1.16)
INR BLD: 1.15 — SIGNIFICANT CHANGE UP (ref 0.88–1.16)
LIPID PNL WITH DIRECT LDL SERPL: 53 MG/DL — SIGNIFICANT CHANGE UP
LYMPHOCYTES # BLD AUTO: 1.62 K/UL — SIGNIFICANT CHANGE UP (ref 1–3.3)
LYMPHOCYTES # BLD AUTO: 24.5 % — SIGNIFICANT CHANGE UP (ref 13–44)
MACROCYTES BLD QL: SLIGHT — SIGNIFICANT CHANGE UP
MAGNESIUM SERPL-MCNC: 2 MG/DL — SIGNIFICANT CHANGE UP (ref 1.6–2.6)
MANUAL SMEAR VERIFICATION: SIGNIFICANT CHANGE UP
MCHC RBC-ENTMCNC: 25.5 PG — LOW (ref 27–34)
MCHC RBC-ENTMCNC: 26.1 PG — LOW (ref 27–34)
MCHC RBC-ENTMCNC: 30.5 GM/DL — LOW (ref 32–36)
MCHC RBC-ENTMCNC: 31.5 GM/DL — LOW (ref 32–36)
MCV RBC AUTO: 82.6 FL — SIGNIFICANT CHANGE UP (ref 80–100)
MCV RBC AUTO: 83.7 FL — SIGNIFICANT CHANGE UP (ref 80–100)
METAMYELOCYTES # FLD: 0.9 % — HIGH (ref 0–0)
MICROCYTES BLD QL: SLIGHT — SIGNIFICANT CHANGE UP
MONOCYTES # BLD AUTO: 0.46 K/UL — SIGNIFICANT CHANGE UP (ref 0–0.9)
MONOCYTES NFR BLD AUTO: 7 % — SIGNIFICANT CHANGE UP (ref 2–14)
NEUTROPHILS # BLD AUTO: 4.12 K/UL — SIGNIFICANT CHANGE UP (ref 1.8–7.4)
NEUTROPHILS NFR BLD AUTO: 62.3 % — SIGNIFICANT CHANGE UP (ref 43–77)
NON HDL CHOLESTEROL: 79 MG/DL — SIGNIFICANT CHANGE UP
NRBC # BLD: 0 /100 WBCS — SIGNIFICANT CHANGE UP (ref 0–0)
OVALOCYTES BLD QL SMEAR: SLIGHT — SIGNIFICANT CHANGE UP
PLAT MORPH BLD: ABNORMAL
PLATELET # BLD AUTO: 207 K/UL — SIGNIFICANT CHANGE UP (ref 150–400)
PLATELET # BLD AUTO: 241 K/UL — SIGNIFICANT CHANGE UP (ref 150–400)
POIKILOCYTOSIS BLD QL AUTO: SLIGHT — SIGNIFICANT CHANGE UP
POLYCHROMASIA BLD QL SMEAR: SLIGHT — SIGNIFICANT CHANGE UP
POTASSIUM SERPL-MCNC: 4.6 MMOL/L — SIGNIFICANT CHANGE UP (ref 3.5–5.3)
POTASSIUM SERPL-MCNC: 4.7 MMOL/L — SIGNIFICANT CHANGE UP (ref 3.5–5.3)
POTASSIUM SERPL-SCNC: 4.6 MMOL/L — SIGNIFICANT CHANGE UP (ref 3.5–5.3)
POTASSIUM SERPL-SCNC: 4.7 MMOL/L — SIGNIFICANT CHANGE UP (ref 3.5–5.3)
PROT SERPL-MCNC: 7 G/DL — SIGNIFICANT CHANGE UP (ref 6–8.3)
PROT SERPL-MCNC: 7.5 G/DL — SIGNIFICANT CHANGE UP (ref 6–8.3)
PROTHROM AB SERPL-ACNC: 12.1 SEC — SIGNIFICANT CHANGE UP (ref 10.5–13.4)
PROTHROM AB SERPL-ACNC: 13.7 SEC — HIGH (ref 10.5–13.4)
RBC # BLD: 4.95 M/UL — SIGNIFICANT CHANGE UP (ref 4.2–5.8)
RBC # BLD: 4.98 M/UL — SIGNIFICANT CHANGE UP (ref 4.2–5.8)
RBC # FLD: 23.9 % — HIGH (ref 10.3–14.5)
RBC # FLD: 24.1 % — HIGH (ref 10.3–14.5)
RBC BLD AUTO: ABNORMAL
RH IG SCN BLD-IMP: POSITIVE — SIGNIFICANT CHANGE UP
RH IG SCN BLD-IMP: POSITIVE — SIGNIFICANT CHANGE UP
SODIUM SERPL-SCNC: 137 MMOL/L — SIGNIFICANT CHANGE UP (ref 135–145)
SODIUM SERPL-SCNC: 141 MMOL/L — SIGNIFICANT CHANGE UP (ref 135–145)
TRIGL SERPL-MCNC: 129 MG/DL — SIGNIFICANT CHANGE UP
WBC # BLD: 6.61 K/UL — SIGNIFICANT CHANGE UP (ref 3.8–10.5)
WBC # BLD: 8.81 K/UL — SIGNIFICANT CHANGE UP (ref 3.8–10.5)
WBC # FLD AUTO: 6.61 K/UL — SIGNIFICANT CHANGE UP (ref 3.8–10.5)
WBC # FLD AUTO: 8.81 K/UL — SIGNIFICANT CHANGE UP (ref 3.8–10.5)

## 2023-06-09 PROCEDURE — 37224: CPT

## 2023-06-09 PROCEDURE — 73706 CT ANGIO LWR EXTR W/O&W/DYE: CPT | Mod: 26,LT

## 2023-06-09 PROCEDURE — 75710 ARTERY X-RAYS ARM/LEG: CPT | Mod: 26,59

## 2023-06-09 RX ORDER — FERROUS SULFATE 325(65) MG
325 TABLET ORAL DAILY
Refills: 0 | Status: DISCONTINUED | OUTPATIENT
Start: 2023-06-09 | End: 2023-06-11

## 2023-06-09 RX ORDER — GLUCAGON INJECTION, SOLUTION 0.5 MG/.1ML
1 INJECTION, SOLUTION SUBCUTANEOUS ONCE
Refills: 0 | Status: DISCONTINUED | OUTPATIENT
Start: 2023-06-09 | End: 2023-06-11

## 2023-06-09 RX ORDER — CYCLOBENZAPRINE HYDROCHLORIDE 10 MG/1
5 TABLET, FILM COATED ORAL DAILY
Refills: 0 | Status: DISCONTINUED | OUTPATIENT
Start: 2023-06-09 | End: 2023-06-11

## 2023-06-09 RX ORDER — SODIUM CHLORIDE 9 MG/ML
1000 INJECTION INTRAMUSCULAR; INTRAVENOUS; SUBCUTANEOUS
Refills: 0 | Status: DISCONTINUED | OUTPATIENT
Start: 2023-06-09 | End: 2023-06-11

## 2023-06-09 RX ORDER — SODIUM CHLORIDE 9 MG/ML
1000 INJECTION, SOLUTION INTRAVENOUS
Refills: 0 | Status: DISCONTINUED | OUTPATIENT
Start: 2023-06-09 | End: 2023-06-11

## 2023-06-09 RX ORDER — CLOPIDOGREL BISULFATE 75 MG/1
75 TABLET, FILM COATED ORAL DAILY
Refills: 0 | Status: DISCONTINUED | OUTPATIENT
Start: 2023-06-10 | End: 2023-06-11

## 2023-06-09 RX ORDER — SODIUM CHLORIDE 9 MG/ML
500 INJECTION INTRAMUSCULAR; INTRAVENOUS; SUBCUTANEOUS
Refills: 0 | Status: DISCONTINUED | OUTPATIENT
Start: 2023-06-09 | End: 2023-06-09

## 2023-06-09 RX ORDER — DIPHENHYDRAMINE HCL 50 MG
50 CAPSULE ORAL ONCE
Refills: 0 | Status: DISCONTINUED | OUTPATIENT
Start: 2023-06-09 | End: 2023-06-09

## 2023-06-09 RX ORDER — ATORVASTATIN CALCIUM 80 MG/1
40 TABLET, FILM COATED ORAL AT BEDTIME
Refills: 0 | Status: DISCONTINUED | OUTPATIENT
Start: 2023-06-09 | End: 2023-06-11

## 2023-06-09 RX ORDER — DEXTROSE 50 % IN WATER 50 %
25 SYRINGE (ML) INTRAVENOUS ONCE
Refills: 0 | Status: DISCONTINUED | OUTPATIENT
Start: 2023-06-09 | End: 2023-06-11

## 2023-06-09 RX ORDER — CHLORHEXIDINE GLUCONATE 213 G/1000ML
1 SOLUTION TOPICAL ONCE
Refills: 0 | Status: DISCONTINUED | OUTPATIENT
Start: 2023-06-09 | End: 2023-06-09

## 2023-06-09 RX ORDER — INSULIN LISPRO 100/ML
VIAL (ML) SUBCUTANEOUS
Refills: 0 | Status: DISCONTINUED | OUTPATIENT
Start: 2023-06-09 | End: 2023-06-11

## 2023-06-09 RX ORDER — INSULIN NPH HUM/REG INSULIN HM 70-30/ML
10 VIAL (ML) SUBCUTANEOUS
Qty: 0 | Refills: 0 | DISCHARGE

## 2023-06-09 RX ORDER — DEXTROSE 50 % IN WATER 50 %
12.5 SYRINGE (ML) INTRAVENOUS ONCE
Refills: 0 | Status: DISCONTINUED | OUTPATIENT
Start: 2023-06-09 | End: 2023-06-11

## 2023-06-09 RX ORDER — ALOGLIPTIN 12.5 MG/1
1 TABLET, FILM COATED ORAL
Qty: 0 | Refills: 0 | DISCHARGE

## 2023-06-09 RX ORDER — METOPROLOL TARTRATE 50 MG
100 TABLET ORAL DAILY
Refills: 0 | Status: DISCONTINUED | OUTPATIENT
Start: 2023-06-09 | End: 2023-06-11

## 2023-06-09 RX ORDER — ASPIRIN/CALCIUM CARB/MAGNESIUM 324 MG
81 TABLET ORAL DAILY
Refills: 0 | Status: DISCONTINUED | OUTPATIENT
Start: 2023-06-10 | End: 2023-06-11

## 2023-06-09 RX ORDER — DEXTROSE 50 % IN WATER 50 %
15 SYRINGE (ML) INTRAVENOUS ONCE
Refills: 0 | Status: DISCONTINUED | OUTPATIENT
Start: 2023-06-09 | End: 2023-06-11

## 2023-06-09 RX ORDER — SODIUM CHLORIDE 9 MG/ML
500 INJECTION INTRAMUSCULAR; INTRAVENOUS; SUBCUTANEOUS ONCE
Refills: 0 | Status: COMPLETED | OUTPATIENT
Start: 2023-06-09 | End: 2023-06-09

## 2023-06-09 RX ORDER — INSULIN ASPART 100 [IU]/ML
20 INJECTION, SUSPENSION SUBCUTANEOUS
Refills: 0 | DISCHARGE

## 2023-06-09 RX ORDER — DIPHENHYDRAMINE HCL 50 MG
50 CAPSULE ORAL ONCE
Refills: 0 | Status: COMPLETED | OUTPATIENT
Start: 2023-06-09 | End: 2023-06-09

## 2023-06-09 RX ORDER — GABAPENTIN 400 MG/1
300 CAPSULE ORAL EVERY 12 HOURS
Refills: 0 | Status: DISCONTINUED | OUTPATIENT
Start: 2023-06-09 | End: 2023-06-11

## 2023-06-09 RX ORDER — CLOPIDOGREL BISULFATE 75 MG/1
600 TABLET, FILM COATED ORAL ONCE
Refills: 0 | Status: COMPLETED | OUTPATIENT
Start: 2023-06-09 | End: 2023-06-09

## 2023-06-09 RX ORDER — LEVOTHYROXINE SODIUM 125 MCG
50 TABLET ORAL DAILY
Refills: 0 | Status: DISCONTINUED | OUTPATIENT
Start: 2023-06-09 | End: 2023-06-11

## 2023-06-09 RX ORDER — ASPIRIN/CALCIUM CARB/MAGNESIUM 324 MG
81 TABLET ORAL ONCE
Refills: 0 | Status: COMPLETED | OUTPATIENT
Start: 2023-06-09 | End: 2023-06-09

## 2023-06-09 RX ORDER — SODIUM CHLORIDE 9 MG/ML
250 INJECTION INTRAMUSCULAR; INTRAVENOUS; SUBCUTANEOUS ONCE
Refills: 0 | Status: COMPLETED | OUTPATIENT
Start: 2023-06-09 | End: 2023-06-09

## 2023-06-09 RX ADMIN — GABAPENTIN 300 MILLIGRAM(S): 400 CAPSULE ORAL at 18:42

## 2023-06-09 RX ADMIN — SODIUM CHLORIDE 1000 MILLILITER(S): 9 INJECTION INTRAMUSCULAR; INTRAVENOUS; SUBCUTANEOUS at 20:30

## 2023-06-09 RX ADMIN — CLOPIDOGREL BISULFATE 600 MILLIGRAM(S): 75 TABLET, FILM COATED ORAL at 10:04

## 2023-06-09 RX ADMIN — SODIUM CHLORIDE 100 MILLILITER(S): 9 INJECTION INTRAMUSCULAR; INTRAVENOUS; SUBCUTANEOUS at 19:18

## 2023-06-09 RX ADMIN — Medication 81 MILLIGRAM(S): at 10:04

## 2023-06-09 RX ADMIN — SODIUM CHLORIDE 125 MILLILITER(S): 9 INJECTION INTRAMUSCULAR; INTRAVENOUS; SUBCUTANEOUS at 10:04

## 2023-06-09 RX ADMIN — Medication 100 MILLIGRAM(S): at 18:42

## 2023-06-09 RX ADMIN — SODIUM CHLORIDE 500 MILLILITER(S): 9 INJECTION INTRAMUSCULAR; INTRAVENOUS; SUBCUTANEOUS at 10:04

## 2023-06-09 RX ADMIN — Medication 325 MILLIGRAM(S): at 18:42

## 2023-06-09 NOTE — PATIENT PROFILE ADULT - FALL HARM RISK - HARM RISK INTERVENTIONS

## 2023-06-09 NOTE — CHART NOTE - NSCHARTNOTEFT_GEN_A_CORE
PA called to bedside as patient endorsing feeling like he has pressure in his L thigh and reporting 4/10 pain. Patient s/p peripheral angiogram where 100%  PA called to bedside as patient endorsing feeling like he has pressure in his L thigh and reporting 4/10 pain. Patient s/p peripheral angiogram where 100%  of L SFA was intervened on w/ drug coated balloon angioplasty x2. ACCESS: R femoral artery and L tibialis anterior artery.   On eval, patient's L thigh is swollen and mildly tense. Patient's vital signs are stable. Labs reveal stable Hgb/Hct.   Interventionalist Dr. Madison made aware and Vascular team consulted. Dr. Madison came to bedside and evaluated patient.   Plan for patient to have CT Angio of L lower extremity to evaluate for bleeding.   Plan pending results of CTA.   Pressure bandage applied to affected area.   Given patient's CKD and pending repeat dye load, will increase IV fluids.

## 2023-06-09 NOTE — CONSULT NOTE ADULT - ASSESSMENT
56 yo male , PMHx of HTN, HLD, DM T2, CKD (baseline unknown),  PAD, CVA x3 (2013/2014/2015 w/mild right side facial drooping, residual R sided weakness and aphasia), known CAD w/ 3VCABG (LIMA-LAD, SVG-RCA, SVG-Ramus) admitted today for s/p LLE intervention (DCB x2 to SFA). Vascular Surgery consulted for thigh hematoma after procedure today, concerning for arterial perforation.    Recommendations:  No acute vascular surgery intervention at this time as hematoma is stable   Recommend CT Angio to evaluate for active extravasation in thigh.   Please reach out to vascular team if there are changes in symptoms or hemodynamic status 56 yo male , PMHx of HTN, HLD, DM T2, CKD (baseline unknown),  PAD, CVA x3 (2013/2014/2015 w/mild right side facial drooping, residual R sided weakness and aphasia), known CAD w/ 3VCABG (LIMA-LAD, SVG-RCA, SVG-Ramus) admitted today for s/p LLE intervention (DCB x2 to SFA). Vascular Surgery consulted for thigh hematoma after procedure today, concerning for arterial perforation.    Recommendations:  No acute vascular surgery intervention at this time as hematoma is stable   -Continue compression wrap over L thigh and bedrest.  Recommend CT Angio to evaluate for active extravasation in thigh.   Please reach out to vascular team if there are changes in symptoms or hemodynamic status

## 2023-06-09 NOTE — PROVIDER CONTACT NOTE (OTHER) - ACTION/TREATMENT ORDERED:
STAT labs drawn, cardiac PAs and fellow as well as vascular team at bedside, compression applied to L leg - pt reports decreased pain, L leg wrapped w/ ACE bandage. CT Angiogram ordered.

## 2023-06-09 NOTE — CHART NOTE - NSCHARTNOTEFT_GEN_A_CORE
Patient s/p peripheral intervention: s/p PTA 6/9/23: 100% stenosis p-m LT SFA s/p drug coated balloon x2 via right CFA (perclose) and right Anterior tib (TR band in place). Upon arrival to holding, right groin dressing saturated. Dressing removed and manual pressure applied for 15 minutes followed by 4 cc of epi/lido injected. Hemostasis achieved, distal pulses remain intake and patient hemodynamically stable. Will continue to monitor closely.

## 2023-06-09 NOTE — PROVIDER CONTACT NOTE (OTHER) - ASSESSMENT
Pt ao x4, VSS, NSR on cardiac monitor. Left thigh swollen and warmer than right leg. Doppler pulses present.

## 2023-06-10 DIAGNOSIS — I73.9 PERIPHERAL VASCULAR DISEASE, UNSPECIFIED: ICD-10-CM

## 2023-06-10 DIAGNOSIS — N18.9 CHRONIC KIDNEY DISEASE, UNSPECIFIED: ICD-10-CM

## 2023-06-10 DIAGNOSIS — I10 ESSENTIAL (PRIMARY) HYPERTENSION: ICD-10-CM

## 2023-06-10 DIAGNOSIS — E78.5 HYPERLIPIDEMIA, UNSPECIFIED: ICD-10-CM

## 2023-06-10 DIAGNOSIS — I25.10 ATHEROSCLEROTIC HEART DISEASE OF NATIVE CORONARY ARTERY WITHOUT ANGINA PECTORIS: ICD-10-CM

## 2023-06-10 DIAGNOSIS — E11.9 TYPE 2 DIABETES MELLITUS WITHOUT COMPLICATIONS: ICD-10-CM

## 2023-06-10 LAB
ANION GAP SERPL CALC-SCNC: 11 MMOL/L — SIGNIFICANT CHANGE UP (ref 5–17)
BUN SERPL-MCNC: 22 MG/DL — SIGNIFICANT CHANGE UP (ref 7–23)
CALCIUM SERPL-MCNC: 8.4 MG/DL — SIGNIFICANT CHANGE UP (ref 8.4–10.5)
CHLORIDE SERPL-SCNC: 106 MMOL/L — SIGNIFICANT CHANGE UP (ref 96–108)
CO2 SERPL-SCNC: 20 MMOL/L — LOW (ref 22–31)
CREAT SERPL-MCNC: 1.43 MG/DL — HIGH (ref 0.5–1.3)
EGFR: 57 ML/MIN/1.73M2 — LOW
GLUCOSE BLDC GLUCOMTR-MCNC: 151 MG/DL — HIGH (ref 70–99)
GLUCOSE BLDC GLUCOMTR-MCNC: 160 MG/DL — HIGH (ref 70–99)
GLUCOSE BLDC GLUCOMTR-MCNC: 223 MG/DL — HIGH (ref 70–99)
GLUCOSE BLDC GLUCOMTR-MCNC: 224 MG/DL — HIGH (ref 70–99)
GLUCOSE SERPL-MCNC: 176 MG/DL — HIGH (ref 70–99)
HCT VFR BLD CALC: 36.3 % — LOW (ref 39–50)
HGB BLD-MCNC: 11.1 G/DL — LOW (ref 13–17)
MAGNESIUM SERPL-MCNC: 1.9 MG/DL — SIGNIFICANT CHANGE UP (ref 1.6–2.6)
MCHC RBC-ENTMCNC: 25.7 PG — LOW (ref 27–34)
MCHC RBC-ENTMCNC: 30.6 GM/DL — LOW (ref 32–36)
MCV RBC AUTO: 84 FL — SIGNIFICANT CHANGE UP (ref 80–100)
NRBC # BLD: 0 /100 WBCS — SIGNIFICANT CHANGE UP (ref 0–0)
PLATELET # BLD AUTO: 193 K/UL — SIGNIFICANT CHANGE UP (ref 150–400)
POTASSIUM SERPL-MCNC: 4.5 MMOL/L — SIGNIFICANT CHANGE UP (ref 3.5–5.3)
POTASSIUM SERPL-SCNC: 4.5 MMOL/L — SIGNIFICANT CHANGE UP (ref 3.5–5.3)
RBC # BLD: 4.32 M/UL — SIGNIFICANT CHANGE UP (ref 4.2–5.8)
RBC # FLD: 23.8 % — HIGH (ref 10.3–14.5)
SODIUM SERPL-SCNC: 137 MMOL/L — SIGNIFICANT CHANGE UP (ref 135–145)
WBC # BLD: 9.5 K/UL — SIGNIFICANT CHANGE UP (ref 3.8–10.5)
WBC # FLD AUTO: 9.5 K/UL — SIGNIFICANT CHANGE UP (ref 3.8–10.5)

## 2023-06-10 RX ORDER — INSULIN LISPRO 100/ML
3 VIAL (ML) SUBCUTANEOUS
Refills: 0 | Status: DISCONTINUED | OUTPATIENT
Start: 2023-06-10 | End: 2023-06-11

## 2023-06-10 RX ORDER — ACETAMINOPHEN 500 MG
1000 TABLET ORAL ONCE
Refills: 0 | Status: DISCONTINUED | OUTPATIENT
Start: 2023-06-10 | End: 2023-06-10

## 2023-06-10 RX ORDER — ACETAMINOPHEN 500 MG
325 TABLET ORAL EVERY 4 HOURS
Refills: 0 | Status: DISCONTINUED | OUTPATIENT
Start: 2023-06-10 | End: 2023-06-11

## 2023-06-10 RX ORDER — INSULIN GLARGINE 100 [IU]/ML
10 INJECTION, SOLUTION SUBCUTANEOUS AT BEDTIME
Refills: 0 | Status: DISCONTINUED | OUTPATIENT
Start: 2023-06-10 | End: 2023-06-11

## 2023-06-10 RX ORDER — SODIUM CHLORIDE 9 MG/ML
1000 INJECTION INTRAMUSCULAR; INTRAVENOUS; SUBCUTANEOUS
Refills: 0 | Status: DISCONTINUED | OUTPATIENT
Start: 2023-06-10 | End: 2023-06-11

## 2023-06-10 RX ORDER — ACETAMINOPHEN 500 MG
650 TABLET ORAL ONCE
Refills: 0 | Status: COMPLETED | OUTPATIENT
Start: 2023-06-10 | End: 2023-06-10

## 2023-06-10 RX ORDER — AMLODIPINE BESYLATE 2.5 MG/1
5 TABLET ORAL DAILY
Refills: 0 | Status: DISCONTINUED | OUTPATIENT
Start: 2023-06-10 | End: 2023-06-11

## 2023-06-10 RX ORDER — INSULIN GLARGINE 100 [IU]/ML
10 INJECTION, SOLUTION SUBCUTANEOUS EVERY MORNING
Refills: 0 | Status: DISCONTINUED | OUTPATIENT
Start: 2023-06-11 | End: 2023-06-11

## 2023-06-10 RX ADMIN — SODIUM CHLORIDE 50 MILLILITER(S): 9 INJECTION INTRAMUSCULAR; INTRAVENOUS; SUBCUTANEOUS at 09:51

## 2023-06-10 RX ADMIN — GABAPENTIN 300 MILLIGRAM(S): 400 CAPSULE ORAL at 06:55

## 2023-06-10 RX ADMIN — Medication 325 MILLIGRAM(S): at 12:02

## 2023-06-10 RX ADMIN — GABAPENTIN 300 MILLIGRAM(S): 400 CAPSULE ORAL at 18:24

## 2023-06-10 RX ADMIN — CLOPIDOGREL BISULFATE 75 MILLIGRAM(S): 75 TABLET, FILM COATED ORAL at 12:02

## 2023-06-10 RX ADMIN — CYCLOBENZAPRINE HYDROCHLORIDE 5 MILLIGRAM(S): 10 TABLET, FILM COATED ORAL at 04:09

## 2023-06-10 RX ADMIN — Medication 50 MICROGRAM(S): at 06:07

## 2023-06-10 RX ADMIN — Medication 2: at 22:28

## 2023-06-10 RX ADMIN — INSULIN GLARGINE 10 UNIT(S): 100 INJECTION, SOLUTION SUBCUTANEOUS at 22:28

## 2023-06-10 RX ADMIN — Medication 650 MILLIGRAM(S): at 09:50

## 2023-06-10 RX ADMIN — Medication 100 MILLIGRAM(S): at 06:55

## 2023-06-10 RX ADMIN — ATORVASTATIN CALCIUM 40 MILLIGRAM(S): 80 TABLET, FILM COATED ORAL at 22:28

## 2023-06-10 RX ADMIN — Medication 650 MILLIGRAM(S): at 09:20

## 2023-06-10 RX ADMIN — AMLODIPINE BESYLATE 5 MILLIGRAM(S): 2.5 TABLET ORAL at 09:51

## 2023-06-10 RX ADMIN — Medication 1: at 07:37

## 2023-06-10 RX ADMIN — Medication 81 MILLIGRAM(S): at 12:01

## 2023-06-10 RX ADMIN — ATORVASTATIN CALCIUM 40 MILLIGRAM(S): 80 TABLET, FILM COATED ORAL at 00:29

## 2023-06-10 NOTE — PROGRESS NOTE ADULT - SUBJECTIVE AND OBJECTIVE BOX
24 hr events: CTA obtained, no dissection/pseudo aneurysm of LLE    SUBJECTIVE: Patient seen at bedside in no acute distress. Says L thigh pain and swelling has improved. No CP, SOB, fevers or chills.     Vital Signs Last 24 Hrs  T(C): 36.9 (10 Art 2023 05:26), Max: 37.1 (09 Jun 2023 22:01)  T(F): 98.4 (10 Art 2023 05:26), Max: 98.8 (09 Jun 2023 22:01)  HR: 71 (10 Art 2023 05:43) (66 - 72)  BP: 164/74 (10 Art 2023 05:43) (118/63 - 164/74)  BP(mean): 87 (09 Jun 2023 19:15) (83 - 100)  RR: 16 (10 Art 2023 05:43) (16 - 18)  SpO2: 98% (10 Art 2023 05:43) (98% - 100%)    Parameters below as of 10 Art 2023 05:43  Patient On (Oxygen Delivery Method): room air      Physical Exam:  General: NAD, resting comfortably  HEENT: NC/AT, EOMI, normal hearing, no oral lesions, no LAD, neck supple  Pulmonary: normal resp effort, CTA-B  Cardiovascular: NSR, no murmurs  Abdominal: soft, ND/NT, no organomegaly  Extremities: WWP, normal strength, no clubbing/cyanosis/edema, Left thigh soft, swelling improved from prior exam  Neuro: A/O x 3, CNs II-XII grossly intact, normal sensation, no focal deficits  Pulses: palpable femoral, triphasic popliteal, triphasic PT, biphsaic DP  Lines/drains/tubes:    I&O's Summary    09 Jun 2023 07:01  -  10 Art 2023 07:00  --------------------------------------------------------  IN: 300 mL / OUT: 800 mL / NET: -500 mL        LABS:                        11.1   9.50  )-----------( 193      ( 10 Art 2023 05:30 )             36.3     06-10    137  |  106  |  22  ----------------------------<  176<H>  4.5   |  20<L>  |  1.43<H>    Ca    8.4      10 Art 2023 05:30  Mg     1.9     06-10    TPro  7.0  /  Alb  3.8  /  TBili  0.6  /  DBili  x   /  AST  23  /  ALT  22  /  AlkPhos  57  06-09    PT/INR - ( 09 Jun 2023 18:16 )   PT: 13.7 sec;   INR: 1.15          PTT - ( 09 Jun 2023 18:16 )  PTT:74.3 sec    CAPILLARY BLOOD GLUCOSE      POCT Blood Glucose.: 160 mg/dL (10 Art 2023 06:42)  POCT Blood Glucose.: 107 mg/dL (09 Jun 2023 22:05)  POCT Blood Glucose.: 114 mg/dL (09 Jun 2023 18:50)  POCT Blood Glucose.: 134 mg/dL (09 Jun 2023 11:37)    LIVER FUNCTIONS - ( 09 Jun 2023 18:16 )  Alb: 3.8 g/dL / Pro: 7.0 g/dL / ALK PHOS: 57 U/L / ALT: 22 U/L / AST: 23 U/L / GGT: x             RADIOLOGY & ADDITIONAL STUDIES:    INTERPRETATION: [facility] RADIOLOGIST PRELIMINARY REPORT      Initial report created on 6/10/2023 1:12:37 AM EDT  PROCEDURE INFORMATION:  Exam: CTA Right Lower Extremity With Contrast  Exam date and time: 6/9/2023 7:36 PM  Age: 57 years old  Clinical indication: Peripheral vascular disease. Pain; Thigh; Prior surgery;  Surgery date: Post-operative (0-2 days); Surgery type: S/P peripheral angiogram  where 100% . Of L sfa was intervened on w/ drug coated balloon angioplasty;  Patient HX: Concern for perforation    TECHNIQUE:  Imaging protocol: Computed tomographic angiography of the right lower extremity  with contrast.    COMPARISON:  No relevant prior studies available.    FINDINGS:  Right iliac arteries: Mild-to-moderate stenosis in right common iliac artery.  Right external iliac artery widely patent.  Right femoral/popliteal arteries: Right common femoral artery widely patent.  Short-segment occlusion in mid to distal right femoral artery, with  reconstitution distally. Right popliteal artery appears patent.  Right infrapopliteal arteries: No occlusion or significant stenosis.    Bones/joints: No acute fracture. No dislocation.  Soft tissues: Unremarkable.    IMPRESSION:  1. Mild-to-moderate stenosis in right common iliac artery.  2. Short-segment occlusion in mid to distal right femoral artery, with  reconstitution distally.      =========================  PROCEDURE INFORMATION:  Exam: CTA Left Lower Extremity With Contrast  Exam date and time: 6/9/2023 7:36 PM  Age: 57 years old  Clinical indication: Pain; Thigh; Left; Prior surgery; Surgery date:  Post-operative (0-2 days); Surgery type: S/P peripheral angiogram where 100%  . Of L sfa was intervened on w/ drug coated balloon angioplasty; Patient HX:  Concern for perforation    TECHNIQUE:  Imaging protocol: Computed tomographic angiography of the left lower extremity  with contrast.  3D rendering (Not supervised by radiologist): MIP and/or 3D reconstructed  images were created by the technologist.    COMPARISON:  No relevant prior studies available.    FINDINGS:  Left iliac arteries: No severe stenosis or occlusion in the left common iliac  or external iliac arteries.  Left femoral/popliteal arteries: Left common femoral artery is patent. Left  femoral artery appears patent, without significant stenosis. No pseudoaneurysm  or active extravasation from the left femoral artery. Multiple areas of  irregular plaque, but no definite dissection in left femoral artery. Left  popliteal artery is patent. Mild stranding in the left mid to distal thigh  around the left femoral artery, likely a small hematoma.  Left infrapopliteal arteries: No occlusion or significant stenosis.    Bladder: Normal urinary bladder. Distended with excreted contrast, likely from  earlier angiogram.  Reproductive: Normal prostate gland.  Bones/joints: No acute fracture. No dislocation.  Soft tissues: Unremarkable.    IMPRESSION:  1. No pseudoaneurysm or active extravasation from the left femoral artery.  2. Multiple areas of irregular plaque, but no definite dissection in left  femoral artery.  3. No severe stenosis in the left lower extremity.  4. Mild stranding in the left mid to distal thigh around the left femoral  artery, likely a small hematoma

## 2023-06-10 NOTE — PROGRESS NOTE ADULT - PROBLEM SELECTOR PLAN 1
- o/p Lower Extremity Arterial Duplex (per MD note): R side severe arterial insufficiency due to fem-pop and infrapopliteal disease; L sided severe arterial insufficiency due to iliac, fem-pop, and infrapopliteal disease.   - s/p peripheral cath 6/9/23: DCB X 2 100% stenosis p-m LT SFA,  R FA PC,  anterior tib TR access with plan to Return in 6 weeks for staged proc of right leg.   - Hospital course complicated by L thigh pain/pressure, post procedure on arrival to [facility], with swollen, tense L thigh.  Vascular team following.    - CT LLE 6/9 prelim:   No pseudoaneurysm or active extravasation from the left femoral artery. Multiple areas of irregular plaque, but no definite dissection in left femoral artery. No severe stenosis in the left lower extremity.  Mild stranding in the left mid to distal thigh around the left femoral artery, likely a small hematoma. Dr. Madison aware of pre-fonseca findings, f/u official read.   - Vasc rec: No acute vascular surgery intervention at this time as hematoma is improving; pressure bandage removed as per vasc recs. L thigh mild swollen and mild tense, L DP/PT + dopplers;  - L ankle mild swollen;  L DP/PT + dopplers; Los Lin aware-- tylenol PRN, ice packs and leg elevation.   - c/w asa 81 mg daily, Plavix 75 mg daily and Lipitor 40 mg daily

## 2023-06-10 NOTE — PROGRESS NOTE ADULT - PROBLEM SELECTOR PLAN 6
- Cr 1.4 this admission, baseline Cr unknown  - receiving gentle hydration today NS @ 50 cc/hr X 4; f/u am Cr.    DVT PPX: SCD   Dispo: likely d/c 6/11.    Case d/w Dr. Hylton and Los Lin,

## 2023-06-10 NOTE — PROGRESS NOTE ADULT - ASSESSMENT
58 yo male , PMHx of HTN, HLD, DM T2, CKD (baseline unknown),  PAD, CVA x3 (2013/2014/2015 w/mild right side facial drooping, residual R sided weakness and aphasia), known CAD w/ 3VCABG (LIMA-LAD, SVG-RCA, SVG-Ramus) admitted today for s/p LLE intervention (DCB x2 to SFA). Vascular Surgery consulted for thigh hematoma after procedure today, concerning for arterial perforation.    Recommendations:  No acute vascular surgery intervention at this time as hematoma is improving  -CTA reviewed, no pseudoaneurysm or active extravasation from the left femoral artery.     Vascular surgery will continue to follow 56 yo male , PMHx of HTN, HLD, DM T2, CKD (baseline unknown),  PAD, CVA x3 (2013/2014/2015 w/mild right side facial drooping, residual R sided weakness and aphasia), known CAD w/ 3VCABG (LIMA-LAD, SVG-RCA, SVG-Ramus) admitted today for s/p LLE intervention (DCB x2 to SFA). Vascular Surgery consulted for thigh hematoma after procedure today, concerning for arterial perforation.    Recommendations:  No acute vascular surgery intervention at this time as hematoma is improving  -CTA reviewed, no pseudoaneurysm or active extravasation from the left femoral artery.     Vascular surgery sign off for now, please reconsult as necessary

## 2023-06-10 NOTE — PROGRESS NOTE ADULT - SUBJECTIVE AND OBJECTIVE BOX
Interventional Cardiology PA Adult Progress Note    Subjective Assessment:  	  MEDICATIONS:  metoprolol succinate  milliGRAM(s) Oral daily        cyclobenzaprine 5 milliGRAM(s) Oral daily PRN  gabapentin 300 milliGRAM(s) Oral every 12 hours      atorvastatin 40 milliGRAM(s) Oral at bedtime  dextrose 50% Injectable 25 Gram(s) IV Push once  dextrose 50% Injectable 25 Gram(s) IV Push once  dextrose 50% Injectable 12.5 Gram(s) IV Push once  dextrose Oral Gel 15 Gram(s) Oral once PRN  glucagon  Injectable 1 milliGRAM(s) IntraMuscular once  insulin lispro (ADMELOG) corrective regimen sliding scale   SubCutaneous Before meals and at bedtime  levothyroxine 50 MICROGram(s) Oral daily    aspirin enteric coated 81 milliGRAM(s) Oral daily  clopidogrel Tablet 75 milliGRAM(s) Oral daily  dextrose 5%. 1000 milliLiter(s) IV Continuous <Continuous>  dextrose 5%. 1000 milliLiter(s) IV Continuous <Continuous>  ferrous    sulfate 325 milliGRAM(s) Oral daily  sodium chloride 0.9%. 1000 milliLiter(s) IV Continuous <Continuous>      	    [PHYSICAL EXAM:  TELEMETRY:  T(C): 36.9 (06-10-23 @ 05:26), Max: 37.1 (06-09-23 @ 22:01)  HR: 71 (06-10-23 @ 05:43) (66 - 72)  BP: 164/74 (06-10-23 @ 05:43) (118/63 - 164/74)  RR: 16 (06-10-23 @ 05:43) (16 - 18)  SpO2: 98% (06-10-23 @ 05:43) (98% - 100%)  Wt(kg): --  I&O's Summary    09 Jun 2023 07:01  -  10 Art 2023 07:00  --------------------------------------------------------  IN: 300 mL / OUT: 800 mL / NET: -500 mL      Height (cm): 172.7 (06-09 @ 10:27)  Weight (kg): 74.4 (06-09 @ 10:27)  BMI (kg/m2): 24.9 (06-09 @ 10:27)  BSA (m2): 1.88 (06-09 @ 10:27)  Saavedra:  Central/PICC/Mid Line:                                         Appearance: Normal	  HEENT:   Normal oral mucosa, PERRL, EOMI	  Neck: Supple, + JVD/ - JVD; Carotid Bruit   Cardiovascular: Normal S1 S2, No JVD, No murmurs,   Respiratory: Lungs clear to auscultation/Decreased Breath Sounds/No Rales, Rhonchi, Wheezing	  Gastrointestinal:  Soft, Non-tender, + BS	  Skin: No rashes, No ecchymoses, No cyanosis  Extremities: Normal range of motion, No clubbing, cyanosis or edema  Vascular: Peripheral pulses palpable 2+ bilaterally  Neurologic: Non-focal  Psychiatry: A & O x 3, Mood & affect appropriate      	    ECG:  	  RADIOLOGY:   DIAGNOSTIC TESTING:  [ ] Echocardiogram:  [ ]  Catheterization:  [ ] Stress Test:    [ ] ADELINA  OTHER: 	    LABS:	 	  CARDIAC MARKERS:                                  11.1   9.50  )-----------( 193      ( 10 Art 2023 05:30 )             36.3     06-10    137  |  106  |  22  ----------------------------<  176<H>  4.5   |  20<L>  |  1.43<H>    Ca    8.4      10 Art 2023 05:30  Mg     1.9     06-10    TPro  7.0  /  Alb  3.8  /  TBili  0.6  /  DBili  x   /  AST  23  /  ALT  22  /  AlkPhos  57  06-09    proBNP:   Lipid Profile:   HgA1c:   TSH:   PT/INR - ( 09 Jun 2023 18:16 )   PT: 13.7 sec;   INR: 1.15          PTT - ( 09 Jun 2023 18:16 )  PTT:74.3 sec    ASSESSMENT/PLAN: 	        DVT ppx:  Dispo:     Interventional Cardiology PA Adult Progress Note    CC: peripheral cath     Subjective Assessment:  	  MEDICATIONS:  metoprolol succinate  milliGRAM(s) Oral daily        cyclobenzaprine 5 milliGRAM(s) Oral daily PRN  gabapentin 300 milliGRAM(s) Oral every 12 hours      atorvastatin 40 milliGRAM(s) Oral at bedtime  dextrose 50% Injectable 25 Gram(s) IV Push once  dextrose 50% Injectable 25 Gram(s) IV Push once  dextrose 50% Injectable 12.5 Gram(s) IV Push once  dextrose Oral Gel 15 Gram(s) Oral once PRN  glucagon  Injectable 1 milliGRAM(s) IntraMuscular once  insulin lispro (ADMELOG) corrective regimen sliding scale   SubCutaneous Before meals and at bedtime  levothyroxine 50 MICROGram(s) Oral daily    aspirin enteric coated 81 milliGRAM(s) Oral daily  clopidogrel Tablet 75 milliGRAM(s) Oral daily  dextrose 5%. 1000 milliLiter(s) IV Continuous <Continuous>  dextrose 5%. 1000 milliLiter(s) IV Continuous <Continuous>  ferrous    sulfate 325 milliGRAM(s) Oral daily  sodium chloride 0.9%. 1000 milliLiter(s) IV Continuous <Continuous>      	    [PHYSICAL EXAM:  TELEMETRY:  T(C): 36.9 (06-10-23 @ 05:26), Max: 37.1 (06-09-23 @ 22:01)  HR: 71 (06-10-23 @ 05:43) (66 - 72)  BP: 164/74 (06-10-23 @ 05:43) (118/63 - 164/74)  RR: 16 (06-10-23 @ 05:43) (16 - 18)  SpO2: 98% (06-10-23 @ 05:43) (98% - 100%)  Wt(kg): --  I&O's Summary    09 Jun 2023 07:01  -  10 Art 2023 07:00  --------------------------------------------------------  IN: 300 mL / OUT: 800 mL / NET: -500 mL      Height (cm): 172.7 (06-09 @ 10:27)  Weight (kg): 74.4 (06-09 @ 10:27)  BMI (kg/m2): 24.9 (06-09 @ 10:27)  BSA (m2): 1.88 (06-09 @ 10:27)  Saavedra:  Central/PICC/Mid Line:                                             	    ECG:  	  RADIOLOGY:   DIAGNOSTIC TESTING:  [ ] Echocardiogram:  [ ]  Catheterization:  [ ] Stress Test:    [ ] ADELINA  OTHER: 	    LABS:	 	  CARDIAC MARKERS:                                  11.1   9.50  )-----------( 193      ( 10 Art 2023 05:30 )             36.3     06-10    137  |  106  |  22  ----------------------------<  176<H>  4.5   |  20<L>  |  1.43<H>    Ca    8.4      10 Art 2023 05:30  Mg     1.9     06-10    TPro  7.0  /  Alb  3.8  /  TBili  0.6  /  DBili  x   /  AST  23  /  ALT  22  /  AlkPhos  57  06-09    proBNP:   Lipid Profile:   HgA1c:   TSH:   PT/INR - ( 09 Jun 2023 18:16 )   PT: 13.7 sec;   INR: 1.15          PTT - ( 09 Jun 2023 18:16 )  PTT:74.3 sec    ASSESSMENT/PLAN: 	        DVT ppx:  Dispo:     Interventional Cardiology PA Adult Progress Note    CC: peripheral cath     Subjective Assessment: Pt. seen and examined at bedside today am. Pt.  reports mild pain L thigh and some pain on L ankle, denies any CP, SOB, dizziness, palpitation.     ROS neg except as per subjective HPI,   	  MEDICATIONS:  metoprolol succinate  milliGRAM(s) Oral daily  cyclobenzaprine 5 milliGRAM(s) Oral daily PRN  gabapentin 300 milliGRAM(s) Oral every 12 hours  atorvastatin 40 milliGRAM(s) Oral at bedtime  dextrose 50% Injectable 25 Gram(s) IV Push once  dextrose 50% Injectable 25 Gram(s) IV Push once  dextrose 50% Injectable 12.5 Gram(s) IV Push once  dextrose Oral Gel 15 Gram(s) Oral once PRN  glucagon  Injectable 1 milliGRAM(s) IntraMuscular once  insulin lispro (ADMELOG) corrective regimen sliding scale   SubCutaneous Before meals and at bedtime  levothyroxine 50 MICROGram(s) Oral daily  aspirin enteric coated 81 milliGRAM(s) Oral daily  clopidogrel Tablet 75 milliGRAM(s) Oral daily  dextrose 5%. 1000 milliLiter(s) IV Continuous <Continuous>  dextrose 5%. 1000 milliLiter(s) IV Continuous <Continuous>  ferrous    sulfate 325 milliGRAM(s) Oral daily  sodium chloride 0.9%. 1000 milliLiter(s) IV Continuous <Continuous>      	    [PHYSICAL EXAM:  TELEMETRY:  T(C): 36.9 (06-10-23 @ 05:26), Max: 37.1 (06-09-23 @ 22:01)  HR: 71 (06-10-23 @ 05:43) (66 - 72)  BP: 164/74 (06-10-23 @ 05:43) (118/63 - 164/74)  RR: 16 (06-10-23 @ 05:43) (16 - 18)  SpO2: 98% (06-10-23 @ 05:43) (98% - 100%)  Wt(kg): --  I&O's Summary    09 Jun 2023 07:01  -  10 Art 2023 07:00  --------------------------------------------------------  IN: 300 mL / OUT: 800 mL / NET: -500 mL      Height (cm): 172.7 (06-09 @ 10:27)  Weight (kg): 74.4 (06-09 @ 10:27)  BMI (kg/m2): 24.9 (06-09 @ 10:27)  BSA (m2): 1.88 (06-09 @ 10:27)  Saavedra:  Central/PICC/Mid Line:                                           Gen: NAD  Neck: no JVD  CV: RRR, s1 and s2 positive, no murmur appreciated  Pulm: CTA B/L; no w/r/r  GI: soft, NT/ND, + BS X 4  extremities: L leg thigh mild swollen (improved from 6/9), mild tense L thigh,  L ankle mild swollen, non-tender, no erythema; L/R DP/PT + faint to touch/dopplers. R groin access soft, mild tender with exhymosis, no hematoma   neuro: AO X 3, no focal deficits appreciated.    	    ECG:  	  RADIOLOGY:   DIAGNOSTIC TESTING:  [ ] Echocardiogram:  [ ]  Catheterization:  [ ] Stress Test:    [ ] ADELINA  OTHER: 	    LABS:	 	  CARDIAC MARKERS:                                  11.1   9.50  )-----------( 193      ( 10 Art 2023 05:30 )             36.3     06-10    137  |  106  |  22  ----------------------------<  176<H>  4.5   |  20<L>  |  1.43<H>    Ca    8.4      10 Art 2023 05:30  Mg     1.9     06-10    TPro  7.0  /  Alb  3.8  /  TBili  0.6  /  DBili  x   /  AST  23  /  ALT  22  /  AlkPhos  57  06-09    proBNP:   Lipid Profile:   HgA1c:   TSH:   PT/INR - ( 09 Jun 2023 18:16 )   PT: 13.7 sec;   INR: 1.15          PTT - ( 09 Jun 2023 18:16 )  PTT:74.3 sec    ASSESSMENT/PLAN: 	        DVT ppx:  Dispo:

## 2023-06-10 NOTE — PROGRESS NOTE ADULT - ASSESSMENT
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  Pt s/p peripheral cath 6/9/23: DCB X 2 100% stenosis p-m LT SFA,  R FA PC,  anterior tib TR access with plan to Return in 6 weeks for staged proc of right leg. Hospital course complicated by small L thigh hematoma, Vascular team following.

## 2023-06-10 NOTE — PROGRESS NOTE ADULT - PROBLEM SELECTOR PLAN 2
- known CAD w/ 3VCABG (LIMA-LAD, SVG-RCA, SVG-Ramus)  - TTE (9/2022): LVEF 55-60%, no significant valvular disease, PASP 23mmHg.   - c/w asa 81 mg daily, Plavix 75 mg daily,  Lipitor 40 mg daily, Toprol 100 mg daily

## 2023-06-11 ENCOUNTER — TRANSCRIPTION ENCOUNTER (OUTPATIENT)
Age: 58
End: 2023-06-11

## 2023-06-11 VITALS — TEMPERATURE: 97 F

## 2023-06-11 LAB
ANION GAP SERPL CALC-SCNC: 13 MMOL/L — SIGNIFICANT CHANGE UP (ref 5–17)
BUN SERPL-MCNC: 28 MG/DL — HIGH (ref 7–23)
CALCIUM SERPL-MCNC: 9.2 MG/DL — SIGNIFICANT CHANGE UP (ref 8.4–10.5)
CHLORIDE SERPL-SCNC: 105 MMOL/L — SIGNIFICANT CHANGE UP (ref 96–108)
CO2 SERPL-SCNC: 20 MMOL/L — LOW (ref 22–31)
CREAT SERPL-MCNC: 1.5 MG/DL — HIGH (ref 0.5–1.3)
EGFR: 54 ML/MIN/1.73M2 — LOW
GLUCOSE BLDC GLUCOMTR-MCNC: 166 MG/DL — HIGH (ref 70–99)
GLUCOSE BLDC GLUCOMTR-MCNC: 169 MG/DL — HIGH (ref 70–99)
GLUCOSE SERPL-MCNC: 156 MG/DL — HIGH (ref 70–99)
HCT VFR BLD CALC: 35.5 % — LOW (ref 39–50)
HGB BLD-MCNC: 11 G/DL — LOW (ref 13–17)
MAGNESIUM SERPL-MCNC: 1.8 MG/DL — SIGNIFICANT CHANGE UP (ref 1.6–2.6)
MCHC RBC-ENTMCNC: 26.2 PG — LOW (ref 27–34)
MCHC RBC-ENTMCNC: 31 GM/DL — LOW (ref 32–36)
MCV RBC AUTO: 84.5 FL — SIGNIFICANT CHANGE UP (ref 80–100)
NRBC # BLD: 0 /100 WBCS — SIGNIFICANT CHANGE UP (ref 0–0)
PLATELET # BLD AUTO: 201 K/UL — SIGNIFICANT CHANGE UP (ref 150–400)
POTASSIUM SERPL-MCNC: 4.1 MMOL/L — SIGNIFICANT CHANGE UP (ref 3.5–5.3)
POTASSIUM SERPL-SCNC: 4.1 MMOL/L — SIGNIFICANT CHANGE UP (ref 3.5–5.3)
RBC # BLD: 4.2 M/UL — SIGNIFICANT CHANGE UP (ref 4.2–5.8)
RBC # FLD: 24.1 % — HIGH (ref 10.3–14.5)
SODIUM SERPL-SCNC: 138 MMOL/L — SIGNIFICANT CHANGE UP (ref 135–145)
WBC # BLD: 9.11 K/UL — SIGNIFICANT CHANGE UP (ref 3.8–10.5)
WBC # FLD AUTO: 9.11 K/UL — SIGNIFICANT CHANGE UP (ref 3.8–10.5)

## 2023-06-11 PROCEDURE — 36415 COLL VENOUS BLD VENIPUNCTURE: CPT

## 2023-06-11 PROCEDURE — C1760: CPT

## 2023-06-11 PROCEDURE — 86850 RBC ANTIBODY SCREEN: CPT

## 2023-06-11 PROCEDURE — 80061 LIPID PANEL: CPT

## 2023-06-11 PROCEDURE — 82550 ASSAY OF CK (CPK): CPT

## 2023-06-11 PROCEDURE — 82553 CREATINE MB FRACTION: CPT

## 2023-06-11 PROCEDURE — C1769: CPT

## 2023-06-11 PROCEDURE — 83735 ASSAY OF MAGNESIUM: CPT

## 2023-06-11 PROCEDURE — 80048 BASIC METABOLIC PNL TOTAL CA: CPT

## 2023-06-11 PROCEDURE — 85610 PROTHROMBIN TIME: CPT

## 2023-06-11 PROCEDURE — 83036 HEMOGLOBIN GLYCOSYLATED A1C: CPT

## 2023-06-11 PROCEDURE — 86900 BLOOD TYPING SEROLOGIC ABO: CPT

## 2023-06-11 PROCEDURE — 86901 BLOOD TYPING SEROLOGIC RH(D): CPT

## 2023-06-11 PROCEDURE — C1894: CPT

## 2023-06-11 PROCEDURE — 85027 COMPLETE CBC AUTOMATED: CPT

## 2023-06-11 PROCEDURE — C2623: CPT

## 2023-06-11 PROCEDURE — C1887: CPT

## 2023-06-11 PROCEDURE — 85730 THROMBOPLASTIN TIME PARTIAL: CPT

## 2023-06-11 PROCEDURE — C1725: CPT

## 2023-06-11 PROCEDURE — 80053 COMPREHEN METABOLIC PANEL: CPT

## 2023-06-11 PROCEDURE — 85025 COMPLETE CBC W/AUTO DIFF WBC: CPT

## 2023-06-11 PROCEDURE — 82962 GLUCOSE BLOOD TEST: CPT

## 2023-06-11 PROCEDURE — 73706 CT ANGIO LWR EXTR W/O&W/DYE: CPT

## 2023-06-11 RX ORDER — CLOPIDOGREL BISULFATE 75 MG/1
1 TABLET, FILM COATED ORAL
Qty: 30 | Refills: 11
Start: 2023-06-11 | End: 2024-06-04

## 2023-06-11 RX ORDER — ATORVASTATIN CALCIUM 80 MG/1
1 TABLET, FILM COATED ORAL
Qty: 30 | Refills: 0
Start: 2023-06-11 | End: 2023-07-10

## 2023-06-11 RX ORDER — MAGNESIUM OXIDE 400 MG ORAL TABLET 241.3 MG
400 TABLET ORAL ONCE
Refills: 0 | Status: COMPLETED | OUTPATIENT
Start: 2023-06-11 | End: 2023-06-11

## 2023-06-11 RX ORDER — AMLODIPINE BESYLATE 2.5 MG/1
1 TABLET ORAL
Qty: 30 | Refills: 11
Start: 2023-06-11 | End: 2024-06-04

## 2023-06-11 RX ORDER — ASPIRIN/CALCIUM CARB/MAGNESIUM 324 MG
1 TABLET ORAL
Qty: 30 | Refills: 11
Start: 2023-06-11 | End: 2024-06-04

## 2023-06-11 RX ADMIN — MAGNESIUM OXIDE 400 MG ORAL TABLET 400 MILLIGRAM(S): 241.3 TABLET ORAL at 11:05

## 2023-06-11 RX ADMIN — Medication 81 MILLIGRAM(S): at 11:07

## 2023-06-11 RX ADMIN — Medication 325 MILLIGRAM(S): at 11:07

## 2023-06-11 RX ADMIN — Medication 50 MICROGRAM(S): at 05:18

## 2023-06-11 RX ADMIN — Medication 3 UNIT(S): at 08:12

## 2023-06-11 RX ADMIN — Medication 100 MILLIGRAM(S): at 06:05

## 2023-06-11 RX ADMIN — Medication 325 MILLIGRAM(S): at 06:44

## 2023-06-11 RX ADMIN — Medication 1: at 08:12

## 2023-06-11 RX ADMIN — Medication 325 MILLIGRAM(S): at 06:14

## 2023-06-11 RX ADMIN — AMLODIPINE BESYLATE 5 MILLIGRAM(S): 2.5 TABLET ORAL at 06:05

## 2023-06-11 RX ADMIN — INSULIN GLARGINE 10 UNIT(S): 100 INJECTION, SOLUTION SUBCUTANEOUS at 08:11

## 2023-06-11 RX ADMIN — GABAPENTIN 300 MILLIGRAM(S): 400 CAPSULE ORAL at 06:05

## 2023-06-11 RX ADMIN — CLOPIDOGREL BISULFATE 75 MILLIGRAM(S): 75 TABLET, FILM COATED ORAL at 11:07

## 2023-06-11 NOTE — DISCHARGE NOTE NURSING/CASE MANAGEMENT/SOCIAL WORK - PATIENT PORTAL LINK FT
You can access the FollowMyHealth Patient Portal offered by [facility] by registering at the following website: [email or web address]. By joining [facility]’s FollowMyHealth portal, you will also be able to view your health information using other applications (apps) compatible with our system.

## 2023-06-11 NOTE — DISCHARGE NOTE PROVIDER - NSDCMRMEDTOKEN_GEN_ALL_CORE_FT
alogliptin-metformin 12.5 mg-500 mg oral tablet: 1 orally 2 times a day  aspirin 81 mg oral delayed release tablet: 1 tab(s) orally once a day  atorvastatin 40 mg oral tablet: 1 tab(s) orally once a day (at bedtime)  Ferrous Sulfate 325mg daily:   Flexeril 5 mg oral tablet: 1 orally once a day as needed for  muscle spasm  GABAPENTIN 300MG CAP: 1 cap(s) orally every 12 hours  JARDIANCE 25MG TAB: 1 tab(s) orally once a day  LEVOTHYROXINE SODIUM 50MCG TAB: 1 tab(s) orally once a day  METFORMIN HYDROCHLORIDE 500MG TAB: 1 tab(s) orally every 12 hours  metoprolol succinate 100 mg oral tablet, extended release: 1 tab(s) orally once a day  NovoLOG Mix 70/30 FlexPen subcutaneous suspension: 30 subcutaneous once a day before breakfast  NovoLOG Mix 70/30 FlexPen subcutaneous suspension: 20 subcutaneous once a day before dinner  Vitamin D, 50,000 weekly:    alogliptin-metformin 12.5 mg-500 mg oral tablet: 1 orally 2 times a day  amLODIPine 5 mg oral tablet: 1 tab(s) orally once a day  aspirin 81 mg oral delayed release tablet: 1 tab(s) orally once a day  clopidogrel 75 mg oral tablet: 1 tab(s) orally once a day  Ferrous Sulfate 325mg daily:   Flexeril 5 mg oral tablet: 1 orally once a day as needed for  muscle spasm  GABAPENTIN 300MG CAP: 1 cap(s) orally every 12 hours  JARDIANCE 25MG TAB: 1 tab(s) orally once a day  LEVOTHYROXINE SODIUM 50MCG TAB: 1 tab(s) orally once a day  Lipitor 80 mg oral tablet: 1 tab(s) orally once a day  METFORMIN HYDROCHLORIDE 500MG TAB: 1 tab(s) orally every 12 hours  metoprolol succinate 100 mg oral tablet, extended release: 1 tab(s) orally once a day  NovoLOG Mix 70/30 FlexPen subcutaneous suspension: 30 subcutaneous once a day before breakfast  NovoLOG Mix 70/30 FlexPen subcutaneous suspension: 20 subcutaneous once a day before dinner  Vitamin D, 50,000 weekly:    alogliptin-metformin 12.5 mg-500 mg oral tablet: 1 orally 2 times a day  amLODIPine 5 mg oral tablet: 1 tab(s) orally once a day  aspirin 81 mg oral delayed release tablet: 1 tab(s) orally once a day  clopidogrel 75 mg oral tablet: 1 tab(s) orally once a day  Ferrous Sulfate 325mg daily: Take 1 tablet once a day  Flexeril 5 mg oral tablet: 1 orally once a day as needed for  muscle spasm  GABAPENTIN 300MG CAP: 1 cap(s) orally every 12 hours  JARDIANCE 25MG TAB: 1 tab(s) orally once a day  LEVOTHYROXINE SODIUM 50MCG TAB: 1 tab(s) orally once a day  Lipitor 80 mg oral tablet: 1 tab(s) orally once a day  METFORMIN HYDROCHLORIDE 500MG TAB: 1 tab(s) orally every 12 hours  metoprolol succinate 100 mg oral tablet, extended release: 1 tab(s) orally once a day  NovoLOG Mix 70/30 FlexPen subcutaneous suspension: 30 subcutaneous once a day before breakfast  NovoLOG Mix 70/30 FlexPen subcutaneous suspension: 20 subcutaneous once a day before dinner  Vitamin D, 50,000 weekly:

## 2023-06-11 NOTE — DISCHARGE NOTE PROVIDER - NSDCFUADDINST_GEN_ALL_CORE_FT
ACTIVITY:     Do not drive or operate hazardous machinery for 24 hours.                        Limit your physical activities for 24 hours.                        Do not engage in in sports, heavy work or heavy lifting for 72 hours.    DIET:             You may resume your regular diet.                        Drinking extra fluids (water, juice) is encouraged.                        Abstain from alcohol for 24 hours.    HYGIENE:     Shower and take off the puncture site dressing the next morning.                        If you received a "closure device" in your groin, you may shower the next day, but not take a bath, hot tub or swim for 5    days.    PAIN MEDICATION:   A mild pain at the puncture siteis not unusual.                                        You may take Tylenol 1-2 tabs every 4-6 hours as needed, for one day.                                        If the pain persists contact the office at [phone]    SPECIAL INSTRUCTIONS:  Signs and symptoms to look out for:       1. Luc bleeding from the puncture site is an emergency.            Put direct pressure on the site and go directly to your local Emergency   Room for treatment.       2. Bleeding under the skin may also occur and a small "black and blue may be expected.         If there appears to be an expanding mass or swelling around the puncture site, apply manual compression and go          immediately to your local Emergency Room for treatment.       3. If your foot/leg or hand/arm (puncture site) becomes cool or blue and/or you are unable to move it, this must be treated as an   emergency.         go directly to your local emergency room for treatment.       4. Excessive puncture site pain is abnormal and should be assessed.      5.  Look out for signs of infection in the puncture site: fever, red streaking of the leg, discharge, pain.      6.  Lack of adequate urine output, provided you are drinking enough fluids, may be cause for concern, notify us if you think this is the case.

## 2023-06-11 NOTE — DISCHARGE NOTE PROVIDER - CARE PROVIDER_API CALL
Nabil Madison.  Cardiovascular Disease  [address] [place] [address]  Phone: [phone]  Fax: [phone]  Follow Up Time: 1 week

## 2023-06-11 NOTE — DISCHARGE NOTE NURSING/CASE MANAGEMENT/SOCIAL WORK - NSDCPEFALRISK_GEN_ALL_CORE
For information on Fall & Injury Prevention, visit: [email or web address] OR  [email or web address] OR  https://www.cdc.gov/steadi/patient.html

## 2023-06-11 NOTE — DISCHARGE NOTE PROVIDER - NSDCCPCAREPLAN_GEN_ALL_CORE_FT
PRINCIPAL DISCHARGE DIAGNOSIS  Diagnosis: Peripheral artery disease  Assessment and Plan of Treatment: -You underwent a peripheral catheterization on 06/09/2023 and the blockage in your LEFT SUPERIOR FEMORAL ARTERY  was opened with stent placement. You are to take ASPIRIN 81 mg daily and PLAVIX (CLOPIDOGREL) 75 mg daily. These medications work to keep your stents open.  NEVER MISS A DOSE OF ASPIRIN OR PLAVIX; IF YOU DO, YOU ARE AT RISK OF YOUR STENT CLOSING AND HAVING A HEART ATTACK. DO NOT STOP THESE TWO MEDICATIONS UNLESS INSTRUCTED TO DO SO BY YOUR CARDIOLOGIST.  Your procedure was done through your groin. You do not need to keep this area covered and you may shower. Please avoid any heavy lifting  (no more than 3 to 5 lbs) or strenuous activity for five days. If you develop any swelling, bleeding, hardening of the skin (hematoma formation), acute pain, numbness/tingling  in your leg please contact your doctor immediately or call our 24/7 line: [phone] Please return to the hospital/seek immediate medical attention if worsening of symptoms- including not limited to chest pain, shortness of breath. Please follow up with Dr. Madison in 1-2 weeks. Please call his office and make an appointment to see him. Please continue a heart healthy diet low in sodium, cholesterol, and fat.        SECONDARY DISCHARGE DIAGNOSES  Diagnosis: Thigh hematoma  Assessment and Plan of Treatment: After your procedure, you developed a hematoma in your thigh - which is pooling of blood under the skin, usually from damage to a blood vessel. You were seen by the vascular team that did not feel any acute intervention was needed at this time. Please continue to monitor the site for increased in size, induration, or bleeding. Please follow-up with your cardiologist, Dr. Madison for furhter management.     PRINCIPAL DISCHARGE DIAGNOSIS  Diagnosis: Peripheral artery disease  Assessment and Plan of Treatment: -You underwent a peripheral catheterization on 06/09/2023 and the blockage in your LEFT SUPERIOR FEMORAL ARTERY  was opened with stent placement. You are to take ASPIRIN 81 mg daily and PLAVIX (CLOPIDOGREL) 75 mg daily. These medications work to keep your stents open.  NEVER MISS A DOSE OF ASPIRIN OR PLAVIX; IF YOU DO, YOU ARE AT RISK OF YOUR STENT CLOSING AND HAVING A HEART ATTACK. DO NOT STOP THESE TWO MEDICATIONS UNLESS INSTRUCTED TO DO SO BY YOUR CARDIOLOGIST.  Your procedure was done through your groin. You do not need to keep this area covered and you may shower. Please avoid any heavy lifting  (no more than 3 to 5 lbs) or strenuous activity for five days. If you develop any swelling, bleeding, hardening of the skin (hematoma formation), acute pain, numbness/tingling  in your leg please contact your doctor immediately or call our 24/7 line: [phone] Please return to the hospital/seek immediate medical attention if worsening of symptoms- including not limited to chest pain, shortness of breath. Please follow up with Dr. Madison in 1-2 weeks. Please call his office and make an appointment to see him. Please continue a heart healthy diet low in sodium, cholesterol, and fat.        SECONDARY DISCHARGE DIAGNOSES  Diagnosis: Thigh hematoma  Assessment and Plan of Treatment: After your procedure, you developed a hematoma in your thigh - which is pooling of blood under the skin, usually from damage to a blood vessel. You were seen by the vascular team that did not feel any acute intervention was needed at this time. Please continue to monitor the site for increased in size, induration, or bleeding.  You may take Tylenol 650 mg every 6 hours as needed for pain.  Please follow-up with your cardiologist, Dr. Madison for furhter management.    Diagnosis: Hyperlipidemia  Assessment and Plan of Treatment: . Too much cholesterol in your arteries may lead to a buildup of plaque known as atherosclerosis and contribute to heart disease. Dr. Madison would like to increase your LIPITOR 40 mg daily to LIPITOR 80 mg daily.  Appropriate refills were sent to your preferred pharmacy. Please follow-up with your cardiologist for further management.      Diagnosis: Hypertension  Assessment and Plan of Treatment: You have a diagnosis of Hypertension or elevated blood pressure. Please continue taking your medications as listed to keep your blood pressure controlled. In addition, there are multiple lifestyle modifications that have been proven to lower blood pressure: maintaining a healthy body weight, engaging in regular physical activity for at least 30 minutes per day on most days, and consuming a diet rich in fruits, vegetables, and low-fat dairy products with a reduced amount of total and saturated fats and sodium. You were started on AMLODIPINE (NORVASC) 5 mg daily. Please contiue this medication, along with your METOPROLOL SUCCINATE (TOPROL XL) 100 mg daily to help lower your blood pressure.   For blood pressures at home that are too high or low please see your Doctor or go to the Emergency Room as necessary.

## 2023-06-11 NOTE — DISCHARGE NOTE PROVIDER - ATTENDING ATTESTATION STATEMENT
Pt awake and alert, ripping off the BiPAP mask. Asking questions about what is happening. Refusing to place BiPAP back on. Placed pt on NC.   Pt states Ambar is not his primary contact it is his niece Rowan. Number has been placed in Epic. Ambar aware that Rowan is his primary contact.     Pt asking for his wallet, money and keys. Money was sent to security.    I have personally seen and examined the patient. I have collaborated with and supervised the

## 2023-06-11 NOTE — DISCHARGE NOTE PROVIDER - HOSPITAL COURSE
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  Pt s/p peripheral cath 6/9/23: DCB X 2 100% stenosis p-m LT SFA,  R FA PC,  anterior tib TR access with plan to Return in 6 weeks for staged proc of right leg. Hospital course complicated by small L thigh hematoma. Patient underwent CT LLE 6/9 prelim:   No pseudoaneurysm or active extravasation from the left femoral artery. Multiple areas of irregular plaque, but no definite dissection in left femoral artery. No severe stenosis in the left lower extremity.  Mild stranding in the left mid to distal thigh around the left femoral artery, likely a small hematoma. Dr. Madison aware of pre-fonseca findings, f/u official read. Vascular team recommended - no acute vascular surgery intervention at this time as hematoma is improving; pressure bandage removed as per vascular recs. L thigh mild swollen and mild tense; L DP/PT + dopplers. Patient with CKD Stage II, Cr: 1.4 this admission. Received gentle hydration.     No significant events on telemetry overnight. Repeat EKG without ischemic changes. Patient has been medically cleared for discharge as per Dr. Pena.  Patient has been given appropriate discharge instructions including medication regimen, access site management and follow up. Medications that patient needs refills on have been e-prescribed to preferred pharmacy.      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  Pt s/p peripheral cath 6/9/23: DCB X 2 100% stenosis p-m LT SFA,  R FA PC,  anterior tib TR access with plan to Return in 6 weeks for staged proc of right leg. Hospital course complicated by small L thigh hematoma. Patient underwent CT LLE 6/9 prelim:   No pseudoaneurysm or active extravasation from the left femoral artery. Multiple areas of irregular plaque, but no definite dissection in left femoral artery. No severe stenosis in the left lower extremity.  Mild stranding in the left mid to distal thigh around the left femoral artery, likely a small hematoma. Dr. Madison aware of pre-fonseca findings, f/u official read. Vascular team recommended - no acute vascular surgery intervention at this time as hematoma is improving; pressure bandage removed as per vascular recs. L thigh mild swollen and mild tense; L DP/PT + dopplers. Patient with CKD Stage II, Cr: 1.4 this admission. Received gentle hydration.     No significant events on telemetry overnight. Repeat EKG without ischemic changes. Patient has been medically cleared for discharge as per Dr. Pena.  Patient has been given appropriate discharge instructions including medication regimen, access site management and follow up. Medications that patient needs refills on have been e-prescribed to preferred pharmacy.

## 2023-06-19 DIAGNOSIS — I70.213 ATHEROSCLEROSIS OF NATIVE ARTERIES OF EXTREMITIES WITH INTERMITTENT CLAUDICATION, BILATERAL LEGS: ICD-10-CM

## 2023-06-19 DIAGNOSIS — Z91.013 ALLERGY TO SEAFOOD: ICD-10-CM

## 2023-06-19 DIAGNOSIS — I69.351 HEMIPLEGIA AND HEMIPARESIS FOLLOWING CEREBRAL INFARCTION AFFECTING RIGHT DOMINANT SIDE: ICD-10-CM

## 2023-06-19 DIAGNOSIS — N18.2 CHRONIC KIDNEY DISEASE, STAGE 2 (MILD): ICD-10-CM
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
## 2023-06-19 DIAGNOSIS — E11.22 TYPE 2 DIABETES MELLITUS WITH DIABETIC CHRONIC KIDNEY DISEASE: ICD-10-CM
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
## 2023-06-19 DIAGNOSIS — Z95.1 PRESENCE OF AORTOCORONARY BYPASS GRAFT: ICD-10-CM

## 2023-06-19 DIAGNOSIS — I69.320 APHASIA FOLLOWING CEREBRAL INFARCTION: ICD-10-CM

## 2023-06-19 DIAGNOSIS — E78.5 HYPERLIPIDEMIA, UNSPECIFIED: ICD-10-CM

## 2023-06-19 DIAGNOSIS — R22.40 LOCALIZED SWELLING, MASS AND LUMP, UNSPECIFIED LOWER LIMB: ICD-10-CM

## 2023-06-19 DIAGNOSIS — Z79.4 LONG TERM (CURRENT) USE OF INSULIN: ICD-10-CM

## 2023-06-19 DIAGNOSIS — I25.10 ATHEROSCLEROTIC HEART DISEASE OF NATIVE CORONARY ARTERY WITHOUT ANGINA PECTORIS: ICD-10-CM

## 2023-06-19 DIAGNOSIS — L76.32 POSTPROCEDURAL HEMATOMA OF SKIN AND SUBCUTANEOUS TISSUE FOLLOWING OTHER PROCEDURE: ICD-10-CM

## 2023-06-19 DIAGNOSIS — I69.392 FACIAL WEAKNESS FOLLOWING CEREBRAL INFARCTION: ICD-10-CM

## 2023-06-19 DIAGNOSIS — I70.92 CHRONIC TOTAL OCCLUSION OF ARTERY OF THE EXTREMITIES: ICD-10-CM

## 2023-06-19 DIAGNOSIS — I12.9 HYPERTENSIVE CHRONIC KIDNEY DISEASE WITH STAGE 1 THROUGH STAGE 4 CHRONIC KIDNEY DISEASE, OR UNSPECIFIED CHRONIC KIDNEY DISEASE: ICD-10-CM

## 2023-06-19 DIAGNOSIS — Z79.82 LONG TERM (CURRENT) USE OF ASPIRIN: ICD-10-CM

## 2023-06-19 DIAGNOSIS — E11.51 TYPE 2 DIABETES MELLITUS WITH DIABETIC PERIPHERAL ANGIOPATHY WITHOUT GANGRENE: ICD-10-CM

## 2023-07-12 VITALS
RESPIRATION RATE: 16 BRPM | WEIGHT: 169.98 LBS | HEIGHT: 65 IN | DIASTOLIC BLOOD PRESSURE: 58 MMHG | OXYGEN SATURATION: 100 % | HEART RATE: 64 BPM | TEMPERATURE: 98 F | SYSTOLIC BLOOD PRESSURE: 109 MMHG

## 2023-07-12 RX ORDER — DIPHENHYDRAMINE HCL 50 MG
50 CAPSULE ORAL ONCE
Refills: 0 | Status: DISCONTINUED | OUTPATIENT
Start: 2023-07-14 | End: 2023-07-15

## 2023-07-12 RX ORDER — CHLORHEXIDINE GLUCONATE 213 G/1000ML
1 SOLUTION TOPICAL ONCE
Refills: 0 | Status: DISCONTINUED | OUTPATIENT
Start: 2023-07-14 | End: 2023-07-15

## 2023-07-12 RX ORDER — ALOGLIPTIN AND METFORMIN HYDROCHLORIDE 12.5; 5 MG/1; MG/1
1 TABLET, FILM COATED ORAL
Refills: 0 | DISCHARGE

## 2023-07-12 RX ORDER — INSULIN ASPART 100 [IU]/ML
30 INJECTION, SUSPENSION SUBCUTANEOUS
Refills: 0 | DISCHARGE

## 2023-07-12 RX ORDER — INSULIN ASPART 100 [IU]/ML
20 INJECTION, SUSPENSION SUBCUTANEOUS
Refills: 0 | DISCHARGE

## 2023-07-12 RX ORDER — METFORMIN HYDROCHLORIDE 850 MG/1
1 TABLET ORAL
Qty: 0 | Refills: 0 | DISCHARGE

## 2023-07-12 NOTE — H&P ADULT - ASSESSMENT
57-year-old male, former smoker, with PMHx SHELLFISH ALLERGY, HTN, HLD, DM, PAD, CKD (Cr 1.4 on 5/3/23), CVA x3 (2013/2014/2015 w/mild right side facial drooping, residual R sided weakness and aphasia now resolved), known CAD w/ 3VCABG 2022 (LIMA-LAD, SVG-RCA, SVG-Ramus), presented to cardiologist Dr. Madison's office after recent left lower extremity angiogram at [facility] on 6/9/23 which revealed 100% occlusion of left SFA with 3 vessel runoff. Patient underwent successful PTA using DCB. Peripheral cath (6/9/23): DCB X 2 100% stenosis p-m LT SFA,  R FA PC,  anterior tib TR access with plan to return in 6 weeks for staged proc of right leg. In light of risk factors, [place] Class 3 claudication symptoms, and abnormal LE arterial duplex, patient presents to [facility] for staged angiogram of RLE with possible intervention if clinically indicated.     -Pt H+H, platelets stable, Pt without any reports of BRBPR, hematuria, prior ICH, melena and no recent or previous GI bleed.   -Loaded with: [x ]81mg ASA, [x ]75mg Plavix,  [ ] ASA 325mg [ ] Plavix 600mg, [ ] No Load [ ]. due to...  -Pt Cr. stable- and LVEF 50%, pre cath fluids ordered per protocol [x]250ml IV bolus over 30 min, [ x] 75cc x2hrs, [ ] 50cc x2hrs, Patient euvolemic on exam.   -Malampatti II  -ASA III    Pt is a Candidate for Moderate Sedation, yes    Risks & benefits of procedure and alternative therapy have been explained to the patient including but not limited to: allergic reaction, bleeding w/possible need for blood transfusion, infection, renal and vascular compromise, limb damage, arrhythmia, stroke, vessel dissection/perforation, Myocardial infarction, emergent CABG. Informed consent obtained and in chart     57-year-old male, former smoker, with PMHx SHELLFISH ALLERGY, HTN, HLD, DM, PAD, CKD (Cr 1.4 on 5/3/23), CVA x3 (2013/2014/2015 w/mild right side facial drooping, residual R sided weakness and aphasia now resolved), known CAD w/ 3VCABG 2022 (LIMA-LAD, SVG-RCA, SVG-Ramus), presented to cardiologist Dr. Madison's office after recent left lower extremity angiogram at [facility] on 6/9/23 which revealed 100% occlusion of left SFA with 3 vessel runoff. Patient underwent successful PTA using DCB. Peripheral cath (6/9/23): DCB X 2 100% stenosis p-m LT SFA,  R FA PC,  anterior tib TR access with plan to return in 6 weeks for staged proc of right leg. In light of risk factors, [place] Class 3 claudication symptoms, and abnormal LE arterial duplex, patient presents to [facility] for staged angiogram of RLE with possible intervention if clinically indicated.     -Pt H+H, platelets stable, Pt without any reports of BRBPR, hematuria, prior ICH, melena and no recent or previous GI bleed.   -Loaded with: [x ]81mg ASA, [x ]75mg Plavix,  [ ] ASA 325mg [ ] Plavix 600mg, [ ] No Load [ ]. due to reporting compliance with home aspirin/plavix with last dose 7/13/23  -Pt Cr. elevated (1.4 on 5/3/23. Today Istat 1.9. Per MD, okay to give aspirin/plavix. LVEF 50%, pre cath fluids ordered per protocol [x]250ml IV bolus over 30 min, [ x] 75cc x2hrs, [ ] 50cc x2hrs, Patient euvolemic on exam.   -Malampatti II  -ASA III    -Given hx Crab allergy with Rash, per MD, pt has order for Benadryl 50mg IVP on call to cath lab    -Pt is a Candidate for Moderate Sedation, yes    Risks & benefits of procedure and alternative therapy have been explained to the patient including but not limited to: allergic reaction, bleeding w/possible need for blood transfusion, infection, renal and vascular compromise, limb damage, arrhythmia, stroke, vessel dissection/perforation, Myocardial infarction, emergent CABG. Informed consent obtained and in chart

## 2023-07-12 NOTE — H&P ADULT - NSHPLABSRESULTS_GEN_ALL_CORE
12.6   8.19  )-----------( 404      ( 14 Jul 2023 11:15 )             38.1                             EKG: NSR, no acute findings 12.6   8.19  )-----------( 198      ( 14 Jul 2023 11:15 )             38.1               PT/INR - ( 14 Jul 2023 11:15 )   PT: 12.5 sec;   INR: 1.05          PTT - ( 14 Jul 2023 11:15 )  PTT:28.3 sec              EKG: NSR, no acute findings

## 2023-07-12 NOTE — H&P ADULT - DP PULSE
+1 B/L Advancement Flap (Double) Text: The defect edges were debeveled with a #15 scalpel blade.  Given the location of the defect and the proximity to free margins a double advancement flap was deemed most appropriate.  Using a sterile surgical marker, the appropriate advancement flaps were drawn incorporating the defect and placing the expected incisions within the relaxed skin tension lines where possible.    The area thus outlined was incised deep to adipose tissue with a #15 scalpel blade.  The skin margins were undermined to an appropriate distance in all directions utilizing iris scissors.

## 2023-07-12 NOTE — H&P ADULT - HISTORY OF PRESENT ILLNESS
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 Madison's office after recent left lower extremity angiogram at [facility] on 6/9/23 which revealed 100% occlusion of left SFA with 3 vessel runoff. Patient underwent successful PTA using DCB. Postoperative course c/b left thigh swelling likely secondary to microperforation. Patient reports swelling is almost gone; reports mild discoloration. Patient reports that he has been able to walk properly without much pain in left leg since procedure. Patient _____ denies CP, SOB, dizziness, palpitations, orthopnea/PND, leg swelling, LOC, bleeding, melena/hematochezia, fever, chills, URI symptoms, or recent illness.     Peripheral cath (6/9/23): DCB X 2 100% stenosis p-m LT SFA,  R FA PC,  anterior tib TR access with plan to return in 6 weeks for staged proc of right leg.     CT LLE (6/9/23): Multifocal pedunculated/irregular plaque in the left superficial femoral artery with moderate (greater than 50%) narrowing proximally; areas of focal dissection cannot be excluded. Intramuscular hematoma in the left vastus musculature and hemorrhage   adjacent to the distal left superficial femoral artery and popliteal artery with no evidence of active extravasation. Occlusion of the mid to distal right superficial femoral artery with reconstitution distally.    TTE (9/8/22): LVEF 60%, GIDD.     In light of risk factors, [place] Class ____ symptoms, patient presents to [facility] for staged angiogram of RLE with possible intervention if clinically indicated.  Cardiologist: Dr. Madison   Pharmacy:   Escort:    57-year-old male, former smoker, with PMHx SHELLFISH ALLERGY, HTN, HLD, DM, PAD, CKD (Cr 1.4 on 5/3/23), CVA x3 (2013/2014/2015 w/mild right side facial drooping, residual R sided weakness and aphasia), known CAD w/ 3VCABG (LIMA-LAD, SVG-RCA, SVG-Ramus), presented to cardiologist Dr. Madison's office after recent left lower extremity angiogram at [facility] on 6/9/23 which revealed 100% occlusion of left SFA with 3 vessel runoff. Patient underwent successful PTA using DCB. Postoperative course c/b left thigh swelling likely secondary to microperforation. Patient reports swelling is almost gone; reports mild discoloration. Patient reports that he has been able to walk properly without much pain in left leg since procedure. Patient _____ denies CP, SOB, dizziness, palpitations, orthopnea/PND, leg swelling, LOC, bleeding, melena/hematochezia, fever, chills, URI symptoms, or recent illness.     Peripheral cath (6/9/23): DCB X 2 100% stenosis p-m LT SFA,  R FA PC,  anterior tib TR access with plan to return in 6 weeks for staged proc of right leg.     CT LLE (6/9/23): Multifocal pedunculated/irregular plaque in the left superficial femoral artery with moderate (greater than 50%) narrowing proximally; areas of focal dissection cannot be excluded. Intramuscular hematoma in the left vastus musculature and hemorrhage   adjacent to the distal left superficial femoral artery and popliteal artery with no evidence of active extravasation. Occlusion of the mid to distal right superficial femoral artery with reconstitution distally.    Lower Extremity Arterial Duplex (per MD note): R side severe arterial insufficiency due to fem-pop and infrapopliteal disease; L sided severe arterial insufficiency due to iliac, fem-pop, and infrapopliteal disease.     TTE (9/8/22): LVEF 60%, GIDD.     In light of risk factors, [place] Class ____ claudication symptoms, and abnormal LE arterial duplex, patient presents to [facility] for staged angiogram of RLE with possible intervention if clinically indicated.  Cardiologist: Dr. Madison   Pharmacy:  [facility]  Escort: staged LE intervention (wife)    57-year-old male, former smoker, with PMHx SHELLFISH ALLERGY, HTN, HLD, DM, PAD, CKD (Cr 1.4 on 5/3/23), CVA x3 (2013/2014/2015 w/mild right side facial drooping, residual R sided weakness and aphasia now resolved), known CAD w/ 3VCABG 2022 (LIMA-LAD, SVG-RCA, SVG-Ramus), presented to cardiologist Dr. Madison's office after recent left lower extremity angiogram at [facility] on 6/9/23 which revealed 100% occlusion of left SFA with 3 vessel runoff. Patient underwent successful PTA using DCB. Postoperative course c/b left thigh swelling likely secondary to microperforation. Patient reports swelling is almost gone; reports mild discoloration. Patient reports that he has been able to walk properly without much pain in left leg since procedure. Patient denies CP, SOB, dizziness, palpitations, orthopnea/PND, leg swelling, LOC, bleeding, melena/hematochezia, fever, chills, URI symptoms, or recent illness.     Peripheral cath (6/9/23): DCB X 2 100% stenosis p-m LT SFA,  R FA PC,  anterior tib TR access with plan to return in 6 weeks for staged proc of right leg.     CT LLE (6/9/23): Multifocal pedunculated/irregular plaque in the left superficial femoral artery with moderate (greater than 50%) narrowing proximally; areas of focal dissection cannot be excluded. Intramuscular hematoma in the left vastus musculature and hemorrhage   adjacent to the distal left superficial femoral artery and popliteal artery with no evidence of active extravasation. Occlusion of the mid to distal right superficial femoral artery with reconstitution distally.    Lower Extremity Arterial Duplex (per MD note): R side severe arterial insufficiency due to fem-pop and infrapopliteal disease; L sided severe arterial insufficiency due to iliac, fem-pop, and infrapopliteal disease.     TTE (9/8/22): LVEF 60%, GIDD.     In light of risk factors, [place] Class 3 claudication symptoms, and abnormal LE arterial duplex, patient presents to [facility] for staged angiogram of RLE with possible intervention if clinically indicated.

## 2023-07-14 ENCOUNTER — INPATIENT (INPATIENT)
Facility: HOSPITAL | Age: 58
LOS: 0 days | Discharge: HOME | DRG: 253 | End: 2023-07-15
Attending: INTERNAL MEDICINE | Admitting: INTERNAL MEDICINE
Payer: MEDICAID

## 2023-07-14 DIAGNOSIS — Z95.1 PRESENCE OF AORTOCORONARY BYPASS GRAFT: Chronic | ICD-10-CM

## 2023-07-14 DIAGNOSIS — Z98.890 OTHER SPECIFIED POSTPROCEDURAL STATES: Chronic | ICD-10-CM

## 2023-07-14 LAB
A1C WITH ESTIMATED AVERAGE GLUCOSE RESULT: 6.7 % — HIGH (ref 4–5.6)
ALBUMIN SERPL ELPH-MCNC: 3.9 G/DL — SIGNIFICANT CHANGE UP (ref 3.3–5)
ALP SERPL-CCNC: 54 U/L — SIGNIFICANT CHANGE UP (ref 40–120)
ALT FLD-CCNC: 21 U/L — SIGNIFICANT CHANGE UP (ref 10–45)
ANION GAP SERPL CALC-SCNC: 12 MMOL/L — SIGNIFICANT CHANGE UP (ref 5–17)
ANISOCYTOSIS BLD QL: SLIGHT — SIGNIFICANT CHANGE UP
APTT BLD: 28.3 SEC — SIGNIFICANT CHANGE UP (ref 27.5–35.5)
AST SERPL-CCNC: 23 U/L — SIGNIFICANT CHANGE UP (ref 10–40)
BASE EXCESS BLDV CALC-SCNC: -4.2 MMOL/L — LOW (ref -2–3)
BASOPHILS # BLD AUTO: 0.07 K/UL — SIGNIFICANT CHANGE UP (ref 0–0.2)
BASOPHILS NFR BLD AUTO: 0.9 % — SIGNIFICANT CHANGE UP (ref 0–2)
BILIRUB SERPL-MCNC: 0.4 MG/DL — SIGNIFICANT CHANGE UP (ref 0.2–1.2)
BLOOD GAS VENOUS - BLOOD UREA NITROGEN: 28 MG/DL — HIGH (ref 7–23)
BLOOD GAS VENOUS - CREATININE: 1.9 MG/DL — HIGH (ref 0.5–1.3)
BUN SERPL-MCNC: 26 MG/DL — HIGH (ref 7–23)
BURR CELLS BLD QL SMEAR: PRESENT — SIGNIFICANT CHANGE UP
CA-I SERPL-SCNC: 1.16 MMOL/L — SIGNIFICANT CHANGE UP (ref 1.15–1.33)
CALCIUM SERPL-MCNC: 8.6 MG/DL — SIGNIFICANT CHANGE UP (ref 8.4–10.5)
CHLORIDE SERPL-SCNC: 108 MMOL/L — SIGNIFICANT CHANGE UP (ref 96–108)
CHOLEST SERPL-MCNC: 108 MG/DL — SIGNIFICANT CHANGE UP
CK MB CFR SERPL CALC: 3.8 NG/ML — SIGNIFICANT CHANGE UP (ref 0–6.7)
CK SERPL-CCNC: 152 U/L — SIGNIFICANT CHANGE UP (ref 30–200)
CO2 BLDV-SCNC: 22.2 MMOL/L — SIGNIFICANT CHANGE UP (ref 22–26)
CO2 SERPL-SCNC: 19 MMOL/L — LOW (ref 22–31)
COHGB MFR BLDV: 0.5 % — SIGNIFICANT CHANGE UP
CREAT SERPL-MCNC: 1.58 MG/DL — HIGH (ref 0.5–1.3)
DACRYOCYTES BLD QL SMEAR: SLIGHT — SIGNIFICANT CHANGE UP
EGFR: 51 ML/MIN/1.73M2 — LOW
EOSINOPHIL # BLD AUTO: 0.14 K/UL — SIGNIFICANT CHANGE UP (ref 0–0.5)
EOSINOPHIL NFR BLD AUTO: 1.7 % — SIGNIFICANT CHANGE UP (ref 0–6)
ESTIMATED AVERAGE GLUCOSE: 146 MG/DL — HIGH (ref 68–114)
GAS PNL BLDV: 137 MMOL/L — SIGNIFICANT CHANGE UP (ref 136–145)
GLUCOSE BLDC GLUCOMTR-MCNC: 107 MG/DL — HIGH (ref 70–99)
GLUCOSE BLDC GLUCOMTR-MCNC: 170 MG/DL — HIGH (ref 70–99)
GLUCOSE BLDV-MCNC: 194 MG/DL — HIGH (ref 70–99)
GLUCOSE SERPL-MCNC: 190 MG/DL — HIGH (ref 70–99)
HCO3 BLDV-SCNC: 21 MMOL/L — LOW (ref 22–29)
HCT VFR BLD CALC: 38.1 % — LOW (ref 39–50)
HCT VFR BLDA CALC: 40 % — SIGNIFICANT CHANGE UP
HCT VFR BLDA CALC: 40 % — SIGNIFICANT CHANGE UP
HDLC SERPL-MCNC: 37 MG/DL — LOW
HGB BLD CALC-MCNC: 13.2 G/DL — SIGNIFICANT CHANGE UP (ref 12.6–17.4)
HGB BLD-MCNC: 12.6 G/DL — LOW (ref 13–17)
HYPOCHROMIA BLD QL: SLIGHT — SIGNIFICANT CHANGE UP
INR BLD: 1.05 — SIGNIFICANT CHANGE UP (ref 0.88–1.16)
ISTAT ACTK (ACTIVATED CLOTTING TIME KAOLIN): 197 SEC — HIGH (ref 74–137)
ISTAT ACTK (ACTIVATED CLOTTING TIME KAOLIN): 257 SEC — HIGH (ref 74–137)
ISTAT ACTK (ACTIVATED CLOTTING TIME KAOLIN): 269 SEC — HIGH (ref 74–137)
LIPID PNL WITH DIRECT LDL SERPL: 51 MG/DL — SIGNIFICANT CHANGE UP
LYMPHOCYTES # BLD AUTO: 0.64 K/UL — LOW (ref 1–3.3)
LYMPHOCYTES # BLD AUTO: 7.8 % — LOW (ref 13–44)
MAGNESIUM SERPL-MCNC: 2.1 MG/DL — SIGNIFICANT CHANGE UP (ref 1.6–2.6)
MANUAL SMEAR VERIFICATION: SIGNIFICANT CHANGE UP
MCHC RBC-ENTMCNC: 29.2 PG — SIGNIFICANT CHANGE UP (ref 27–34)
MCHC RBC-ENTMCNC: 33.1 GM/DL — SIGNIFICANT CHANGE UP (ref 32–36)
MCV RBC AUTO: 88.4 FL — SIGNIFICANT CHANGE UP (ref 80–100)
METAMYELOCYTES # FLD: 0.9 % — HIGH (ref 0–0)
METHGB MFR BLDV: 0 % — SIGNIFICANT CHANGE UP
MICROCYTES BLD QL: SLIGHT — SIGNIFICANT CHANGE UP
MONOCYTES # BLD AUTO: 0.84 K/UL — SIGNIFICANT CHANGE UP (ref 0–0.9)
MONOCYTES NFR BLD AUTO: 10.3 % — SIGNIFICANT CHANGE UP (ref 2–14)
NEUTROPHILS # BLD AUTO: 6.42 K/UL — SIGNIFICANT CHANGE UP (ref 1.8–7.4)
NEUTROPHILS NFR BLD AUTO: 78.4 % — HIGH (ref 43–77)
NON HDL CHOLESTEROL: 71 MG/DL — SIGNIFICANT CHANGE UP
OVALOCYTES BLD QL SMEAR: SLIGHT — SIGNIFICANT CHANGE UP
PCO2 BLDV: 38 MMHG — LOW (ref 42–55)
PH BLDV: 7.35 — SIGNIFICANT CHANGE UP (ref 7.32–7.43)
PLAT MORPH BLD: NORMAL — SIGNIFICANT CHANGE UP
PLATELET # BLD AUTO: 198 K/UL — SIGNIFICANT CHANGE UP (ref 150–400)
PO2 BLDV: 45 MMHG — SIGNIFICANT CHANGE UP (ref 25–45)
POIKILOCYTOSIS BLD QL AUTO: SLIGHT — SIGNIFICANT CHANGE UP
POLYCHROMASIA BLD QL SMEAR: SLIGHT — SIGNIFICANT CHANGE UP
POTASSIUM BLDV-SCNC: 4.9 MMOL/L — SIGNIFICANT CHANGE UP (ref 3.5–5.1)
POTASSIUM SERPL-MCNC: 4.7 MMOL/L — SIGNIFICANT CHANGE UP (ref 3.5–5.3)
POTASSIUM SERPL-SCNC: 4.7 MMOL/L — SIGNIFICANT CHANGE UP (ref 3.5–5.3)
PROT SERPL-MCNC: 6.9 G/DL — SIGNIFICANT CHANGE UP (ref 6–8.3)
PROTHROM AB SERPL-ACNC: 12.5 SEC — SIGNIFICANT CHANGE UP (ref 10.5–13.4)
RBC # BLD: 4.31 M/UL — SIGNIFICANT CHANGE UP (ref 4.2–5.8)
RBC # FLD: 20.6 % — HIGH (ref 10.3–14.5)
RBC BLD AUTO: ABNORMAL
SAO2 % BLDV: 76 % — SIGNIFICANT CHANGE UP (ref 67–88)
SODIUM SERPL-SCNC: 139 MMOL/L — SIGNIFICANT CHANGE UP (ref 135–145)
TRIGL SERPL-MCNC: 101 MG/DL — SIGNIFICANT CHANGE UP
WBC # BLD: 8.19 K/UL — SIGNIFICANT CHANGE UP (ref 3.8–10.5)
WBC # FLD AUTO: 8.19 K/UL — SIGNIFICANT CHANGE UP (ref 3.8–10.5)

## 2023-07-14 PROCEDURE — 37224: CPT

## 2023-07-14 PROCEDURE — 93010 ELECTROCARDIOGRAM REPORT: CPT

## 2023-07-14 PROCEDURE — 75710 ARTERY X-RAYS ARM/LEG: CPT | Mod: 26,59

## 2023-07-14 RX ORDER — ASPIRIN/CALCIUM CARB/MAGNESIUM 324 MG
81 TABLET ORAL DAILY
Refills: 0 | Status: DISCONTINUED | OUTPATIENT
Start: 2023-07-15 | End: 2023-07-15

## 2023-07-14 RX ORDER — GLUCAGON INJECTION, SOLUTION 0.5 MG/.1ML
1 INJECTION, SOLUTION SUBCUTANEOUS ONCE
Refills: 0 | Status: DISCONTINUED | OUTPATIENT
Start: 2023-07-14 | End: 2023-07-15

## 2023-07-14 RX ORDER — PANTOPRAZOLE SODIUM 20 MG/1
1 TABLET, DELAYED RELEASE ORAL
Refills: 0 | DISCHARGE

## 2023-07-14 RX ORDER — DEXTROSE 50 % IN WATER 50 %
25 SYRINGE (ML) INTRAVENOUS ONCE
Refills: 0 | Status: DISCONTINUED | OUTPATIENT
Start: 2023-07-14 | End: 2023-07-15

## 2023-07-14 RX ORDER — LEVOTHYROXINE SODIUM 125 MCG
50 TABLET ORAL DAILY
Refills: 0 | Status: DISCONTINUED | OUTPATIENT
Start: 2023-07-14 | End: 2023-07-15

## 2023-07-14 RX ORDER — INSULIN NPH HUM/REG INSULIN HM 70-30/ML
10 VIAL (ML) SUBCUTANEOUS
Refills: 0 | DISCHARGE

## 2023-07-14 RX ORDER — SODIUM CHLORIDE 9 MG/ML
1000 INJECTION, SOLUTION INTRAVENOUS
Refills: 0 | Status: DISCONTINUED | OUTPATIENT
Start: 2023-07-14 | End: 2023-07-15

## 2023-07-14 RX ORDER — INSULIN LISPRO 100/ML
VIAL (ML) SUBCUTANEOUS
Refills: 0 | Status: DISCONTINUED | OUTPATIENT
Start: 2023-07-14 | End: 2023-07-15

## 2023-07-14 RX ORDER — PANTOPRAZOLE SODIUM 20 MG/1
40 TABLET, DELAYED RELEASE ORAL
Refills: 0 | Status: DISCONTINUED | OUTPATIENT
Start: 2023-07-14 | End: 2023-07-15

## 2023-07-14 RX ORDER — CYCLOBENZAPRINE HYDROCHLORIDE 10 MG/1
1 TABLET, FILM COATED ORAL
Refills: 0 | DISCHARGE

## 2023-07-14 RX ORDER — ATORVASTATIN CALCIUM 80 MG/1
80 TABLET, FILM COATED ORAL AT BEDTIME
Refills: 0 | Status: DISCONTINUED | OUTPATIENT
Start: 2023-07-14 | End: 2023-07-15

## 2023-07-14 RX ORDER — DEXTROSE 50 % IN WATER 50 %
12.5 SYRINGE (ML) INTRAVENOUS ONCE
Refills: 0 | Status: DISCONTINUED | OUTPATIENT
Start: 2023-07-14 | End: 2023-07-15

## 2023-07-14 RX ORDER — AMLODIPINE BESYLATE 2.5 MG/1
5 TABLET ORAL DAILY
Refills: 0 | Status: DISCONTINUED | OUTPATIENT
Start: 2023-07-14 | End: 2023-07-15

## 2023-07-14 RX ORDER — ALOGLIPTIN 12.5 MG/1
1 TABLET, FILM COATED ORAL
Refills: 0 | DISCHARGE

## 2023-07-14 RX ORDER — SODIUM CHLORIDE 9 MG/ML
500 INJECTION INTRAMUSCULAR; INTRAVENOUS; SUBCUTANEOUS
Refills: 0 | Status: DISCONTINUED | OUTPATIENT
Start: 2023-07-14 | End: 2023-07-15

## 2023-07-14 RX ORDER — ASPIRIN/CALCIUM CARB/MAGNESIUM 324 MG
81 TABLET ORAL ONCE
Refills: 0 | Status: COMPLETED | OUTPATIENT
Start: 2023-07-14 | End: 2023-07-14

## 2023-07-14 RX ORDER — DEXTROSE 50 % IN WATER 50 %
15 SYRINGE (ML) INTRAVENOUS ONCE
Refills: 0 | Status: DISCONTINUED | OUTPATIENT
Start: 2023-07-14 | End: 2023-07-15

## 2023-07-14 RX ORDER — SODIUM CHLORIDE 9 MG/ML
250 INJECTION INTRAMUSCULAR; INTRAVENOUS; SUBCUTANEOUS ONCE
Refills: 0 | Status: COMPLETED | OUTPATIENT
Start: 2023-07-14 | End: 2023-07-14

## 2023-07-14 RX ORDER — LOSARTAN POTASSIUM 100 MG/1
1 TABLET, FILM COATED ORAL
Refills: 0 | DISCHARGE

## 2023-07-14 RX ORDER — INSULIN NPH HUM/REG INSULIN HM 70-30/ML
30 VIAL (ML) SUBCUTANEOUS
Refills: 0 | DISCHARGE

## 2023-07-14 RX ORDER — CLOPIDOGREL BISULFATE 75 MG/1
75 TABLET, FILM COATED ORAL ONCE
Refills: 0 | Status: COMPLETED | OUTPATIENT
Start: 2023-07-14 | End: 2023-07-14

## 2023-07-14 RX ORDER — EMPAGLIFLOZIN 10 MG/1
1 TABLET, FILM COATED ORAL
Qty: 0 | Refills: 0 | DISCHARGE

## 2023-07-14 RX ORDER — CLOPIDOGREL BISULFATE 75 MG/1
75 TABLET, FILM COATED ORAL DAILY
Refills: 0 | Status: DISCONTINUED | OUTPATIENT
Start: 2023-07-15 | End: 2023-07-15

## 2023-07-14 RX ORDER — LEVOTHYROXINE SODIUM 125 MCG
1 TABLET ORAL
Qty: 0 | Refills: 0 | DISCHARGE

## 2023-07-14 RX ORDER — GABAPENTIN 400 MG/1
1 CAPSULE ORAL
Qty: 0 | Refills: 0 | DISCHARGE

## 2023-07-14 RX ORDER — METFORMIN HYDROCHLORIDE 850 MG/1
1 TABLET ORAL
Refills: 0 | DISCHARGE

## 2023-07-14 RX ORDER — GABAPENTIN 400 MG/1
300 CAPSULE ORAL EVERY 12 HOURS
Refills: 0 | Status: DISCONTINUED | OUTPATIENT
Start: 2023-07-14 | End: 2023-07-15

## 2023-07-14 RX ORDER — METOPROLOL TARTRATE 50 MG
100 TABLET ORAL DAILY
Refills: 0 | Status: DISCONTINUED | OUTPATIENT
Start: 2023-07-14 | End: 2023-07-15

## 2023-07-14 RX ORDER — ALOGLIPTIN AND METFORMIN HYDROCHLORIDE 12.5; 5 MG/1; MG/1
1 TABLET, FILM COATED ORAL
Refills: 0 | DISCHARGE

## 2023-07-14 RX ADMIN — CLOPIDOGREL BISULFATE 75 MILLIGRAM(S): 75 TABLET, FILM COATED ORAL at 12:18

## 2023-07-14 RX ADMIN — SODIUM CHLORIDE 75 MILLILITER(S): 9 INJECTION INTRAMUSCULAR; INTRAVENOUS; SUBCUTANEOUS at 12:07

## 2023-07-14 RX ADMIN — Medication 2: at 22:33

## 2023-07-14 RX ADMIN — GABAPENTIN 300 MILLIGRAM(S): 400 CAPSULE ORAL at 19:28

## 2023-07-14 RX ADMIN — ATORVASTATIN CALCIUM 80 MILLIGRAM(S): 80 TABLET, FILM COATED ORAL at 21:53

## 2023-07-14 RX ADMIN — SODIUM CHLORIDE 150 MILLILITER(S): 9 INJECTION INTRAMUSCULAR; INTRAVENOUS; SUBCUTANEOUS at 16:01

## 2023-07-14 RX ADMIN — Medication 81 MILLIGRAM(S): at 12:19

## 2023-07-14 RX ADMIN — SODIUM CHLORIDE 500 MILLILITER(S): 9 INJECTION INTRAMUSCULAR; INTRAVENOUS; SUBCUTANEOUS at 12:07

## 2023-07-14 RX ADMIN — AMLODIPINE BESYLATE 5 MILLIGRAM(S): 2.5 TABLET ORAL at 19:28

## 2023-07-14 NOTE — PATIENT PROFILE ADULT - FALL HARM RISK - HARM RISK INTERVENTIONS

## 2023-07-14 NOTE — PATIENT PROFILE ADULT - NSTRANSFERBELONGINGSRESP_GEN_A_NUR
Dermal Autograft Text: The defect edges were debeveled with a #15 scalpel blade.  Given the location of the defect, shape of the defect and the proximity to free margins a dermal autograft was deemed most appropriate.  Using a sterile surgical marker, the primary defect shape was transferred to the donor site. The area thus outlined was incised deep to adipose tissue with a #15 scalpel blade.  The harvested graft was then trimmed of adipose and epidermal tissue until only dermis was left.  The skin graft was then placed in the primary defect and oriented appropriately. yes

## 2023-07-14 NOTE — PROGRESS NOTE ADULT - SUBJECTIVE AND OBJECTIVE BOX
Interventional Cardiology PA Sheath Pull Note    Pt without complaints. VSS      Pre-Sheath Removal:    [ ] Right     [X] Left          [X ] Groin    [ ] Brachial    [ ] 5Fr      [ ] 6Fr    [ X] 7Fr     [ ] 8Fr    [X ] Arterial  [ ] Venous sheath in place    no Hematoma        no Bleed    Pulses:    [ X] Right     [X ] Left       DP:  [ ]  Doppler   [ ]  Faint    [X ]   1+    [ ] 2+       Hemostasis Achieved with:           22      minutes manual pressure    Vasovagal Reaction: No    Meds Given: none      Post-Sheath Removal:    [ ] Right     [X ] Left          [ X] Groin    [ ] Brachial     no  Hematoma         no Bleed       no Bruit    Pulses:    [ X] Right     [X ] Left       DP:  [ ]  Doppler   [ ]  Faint    [ X]   1+    [ ] 2+     A/P:  S/p peripheral angio w/ DCB RSFA via LFA, 7Fr sheath     -Continue bedrest (pt given instructions)  -Continue to monitor

## 2023-07-14 NOTE — PATIENT PROFILE ADULT - FUNCTIONAL ASSESSMENT - DAILY ACTIVITY 2.
Full term, NVD, umbilical cord was around ankle, required a vaccuum extraction 4 = No assist / stand by assistance

## 2023-07-14 NOTE — PATIENT PROFILE ADULT - NSPRESCRUSEDDRG_GEN_A_NUR
Reason for Call: Request for an order or referral:    Order or referral being requested: to discontinue compression stockings    Date needed: as soon as possible    Has the patient been seen by the PCP for this problem? YES    Additional comments: Toña calling patients power of , states that this order was done, but never got to the [facility] where patient is living. Please fax order for this     Phone number Patient can be reached at:  Other phone number:  [phone]    Best Time:  any    Can we leave a detailed message on this number?  YES    Call taken on 6/22/2021 at 11:25 AM by Michelle Fitch         No

## 2023-07-15 ENCOUNTER — TRANSCRIPTION ENCOUNTER (OUTPATIENT)
Age: 58
End: 2023-07-15

## 2023-07-15 VITALS
OXYGEN SATURATION: 97 % | DIASTOLIC BLOOD PRESSURE: 57 MMHG | RESPIRATION RATE: 18 BRPM | HEART RATE: 67 BPM | SYSTOLIC BLOOD PRESSURE: 116 MMHG

## 2023-07-15 LAB
ANION GAP SERPL CALC-SCNC: 12 MMOL/L — SIGNIFICANT CHANGE UP (ref 5–17)
BUN SERPL-MCNC: 22 MG/DL — SIGNIFICANT CHANGE UP (ref 7–23)
CALCIUM SERPL-MCNC: 8.7 MG/DL — SIGNIFICANT CHANGE UP (ref 8.4–10.5)
CHLORIDE SERPL-SCNC: 109 MMOL/L — HIGH (ref 96–108)
CO2 SERPL-SCNC: 20 MMOL/L — LOW (ref 22–31)
CREAT SERPL-MCNC: 1.31 MG/DL — HIGH (ref 0.5–1.3)
EGFR: 63 ML/MIN/1.73M2 — SIGNIFICANT CHANGE UP
GLUCOSE BLDC GLUCOMTR-MCNC: 140 MG/DL — HIGH (ref 70–99)
GLUCOSE BLDC GLUCOMTR-MCNC: 263 MG/DL — HIGH (ref 70–99)
GLUCOSE SERPL-MCNC: 147 MG/DL — HIGH (ref 70–99)
HCT VFR BLD CALC: 43.7 % — SIGNIFICANT CHANGE UP (ref 39–50)
HGB BLD-MCNC: 14 G/DL — SIGNIFICANT CHANGE UP (ref 13–17)
MAGNESIUM SERPL-MCNC: 2 MG/DL — SIGNIFICANT CHANGE UP (ref 1.6–2.6)
MCHC RBC-ENTMCNC: 29 PG — SIGNIFICANT CHANGE UP (ref 27–34)
MCHC RBC-ENTMCNC: 32 GM/DL — SIGNIFICANT CHANGE UP (ref 32–36)
MCV RBC AUTO: 90.5 FL — SIGNIFICANT CHANGE UP (ref 80–100)
NRBC # BLD: 0 /100 WBCS — SIGNIFICANT CHANGE UP (ref 0–0)
PLATELET # BLD AUTO: 208 K/UL — SIGNIFICANT CHANGE UP (ref 150–400)
POTASSIUM SERPL-MCNC: 4.5 MMOL/L — SIGNIFICANT CHANGE UP (ref 3.5–5.3)
POTASSIUM SERPL-SCNC: 4.5 MMOL/L — SIGNIFICANT CHANGE UP (ref 3.5–5.3)
RBC # BLD: 4.83 M/UL — SIGNIFICANT CHANGE UP (ref 4.2–5.8)
RBC # FLD: 20.7 % — HIGH (ref 10.3–14.5)
SODIUM SERPL-SCNC: 141 MMOL/L — SIGNIFICANT CHANGE UP (ref 135–145)
WBC # BLD: 8.93 K/UL — SIGNIFICANT CHANGE UP (ref 3.8–10.5)
WBC # FLD AUTO: 8.93 K/UL — SIGNIFICANT CHANGE UP (ref 3.8–10.5)

## 2023-07-15 PROCEDURE — 82550 ASSAY OF CK (CPK): CPT

## 2023-07-15 PROCEDURE — 80048 BASIC METABOLIC PNL TOTAL CA: CPT

## 2023-07-15 PROCEDURE — 85025 COMPLETE CBC W/AUTO DIFF WBC: CPT

## 2023-07-15 PROCEDURE — 80053 COMPREHEN METABOLIC PANEL: CPT

## 2023-07-15 PROCEDURE — 82962 GLUCOSE BLOOD TEST: CPT

## 2023-07-15 PROCEDURE — 99233 SBSQ HOSP IP/OBS HIGH 50: CPT

## 2023-07-15 PROCEDURE — 85027 COMPLETE CBC AUTOMATED: CPT

## 2023-07-15 PROCEDURE — 80061 LIPID PANEL: CPT

## 2023-07-15 PROCEDURE — C1894: CPT

## 2023-07-15 PROCEDURE — 83036 HEMOGLOBIN GLYCOSYLATED A1C: CPT

## 2023-07-15 PROCEDURE — C1769: CPT

## 2023-07-15 PROCEDURE — 82553 CREATINE MB FRACTION: CPT

## 2023-07-15 PROCEDURE — 82803 BLOOD GASES ANY COMBINATION: CPT

## 2023-07-15 PROCEDURE — 93005 ELECTROCARDIOGRAM TRACING: CPT

## 2023-07-15 PROCEDURE — 83735 ASSAY OF MAGNESIUM: CPT

## 2023-07-15 PROCEDURE — 85610 PROTHROMBIN TIME: CPT

## 2023-07-15 PROCEDURE — C1887: CPT

## 2023-07-15 PROCEDURE — 85730 THROMBOPLASTIN TIME PARTIAL: CPT

## 2023-07-15 PROCEDURE — 36415 COLL VENOUS BLD VENIPUNCTURE: CPT

## 2023-07-15 PROCEDURE — C2623: CPT

## 2023-07-15 PROCEDURE — 85347 COAGULATION TIME ACTIVATED: CPT

## 2023-07-15 PROCEDURE — C1725: CPT

## 2023-07-15 PROCEDURE — 93010 ELECTROCARDIOGRAM REPORT: CPT

## 2023-07-15 RX ORDER — CLOPIDOGREL BISULFATE 75 MG/1
1 TABLET, FILM COATED ORAL
Qty: 30 | Refills: 11
Start: 2023-07-15 | End: 2024-07-08

## 2023-07-15 RX ORDER — ASPIRIN/CALCIUM CARB/MAGNESIUM 324 MG
1 TABLET ORAL
Qty: 30 | Refills: 11
Start: 2023-07-15 | End: 2024-07-08

## 2023-07-15 RX ORDER — ATORVASTATIN CALCIUM 80 MG/1
1 TABLET, FILM COATED ORAL
Qty: 30 | Refills: 2
Start: 2023-07-15 | End: 2023-10-12

## 2023-07-15 RX ADMIN — Medication 81 MILLIGRAM(S): at 12:14

## 2023-07-15 RX ADMIN — AMLODIPINE BESYLATE 5 MILLIGRAM(S): 2.5 TABLET ORAL at 06:24

## 2023-07-15 RX ADMIN — CLOPIDOGREL BISULFATE 75 MILLIGRAM(S): 75 TABLET, FILM COATED ORAL at 12:14

## 2023-07-15 RX ADMIN — Medication 100 MILLIGRAM(S): at 07:46

## 2023-07-15 RX ADMIN — Medication 50 MICROGRAM(S): at 05:41

## 2023-07-15 RX ADMIN — Medication 6: at 12:14

## 2023-07-15 RX ADMIN — GABAPENTIN 300 MILLIGRAM(S): 400 CAPSULE ORAL at 06:24

## 2023-07-15 RX ADMIN — PANTOPRAZOLE SODIUM 40 MILLIGRAM(S): 20 TABLET, DELAYED RELEASE ORAL at 06:23

## 2023-07-15 NOTE — DISCHARGE NOTE NURSING/CASE MANAGEMENT/SOCIAL WORK - NSDCFUADDAPPT_GEN_ALL_CORE_FT
Please follow up with your cardiologist Dr. Madison in 1-2 weeks. Please call to make an appointment [phone]

## 2023-07-15 NOTE — DISCHARGE NOTE PROVIDER - NSDCCPCAREPLAN_GEN_ALL_CORE_FT
PRINCIPAL DISCHARGE DIAGNOSIS  Diagnosis: PAD (peripheral artery disease)  Assessment and Plan of Treatment:       SECONDARY DISCHARGE DIAGNOSES  Diagnosis: HTN (hypertension)  Assessment and Plan of Treatment:     Diagnosis: HLD (hyperlipidemia)  Assessment and Plan of Treatment:     Diagnosis: DM (diabetes mellitus)  Assessment and Plan of Treatment:     Diagnosis: CKD (chronic kidney disease)  Assessment and Plan of Treatment:      PRINCIPAL DISCHARGE DIAGNOSIS  Diagnosis: PAD (peripheral artery disease)  Assessment and Plan of Treatment: You have a history of peripheral artery disease (PAD). PAD is a condition that affects the blood vessels that bring blood to the legs. PAD can cause muscle pain that gets worse with activity and better with rest, called "claudication." PAD can also cause wounds to heal more slowly than usual. You underwent a peripheral angigram on 7/15/23 and recieved a drug coated balloon to your right superior femoral artery.   PLEASE CONTINUE ASPIRIN 81MG DAILY AND PLAVIX 75MG DAILY. DO NOT STOP THESE MEDICATIONS FOR ANY REASON AS THEY ARE KEEPING YOUR STENT OPEN.   Avoid strenuous activity or heavy lifting anything more than 5lbs for the next five days. Do not take a bath or swim for the next five days; you may shower. For any bleeding or hematoma formation (hardened blood collection under the skin) at the access site of your left groin please hold pressure and go to the emergency room. Please follow up with Dr. Madison in 1-2 weeks. For recurrent chest pain, please call your doctor or go to the emergency room.        SECONDARY DISCHARGE DIAGNOSES  Diagnosis: HTN (hypertension)  Assessment and Plan of Treatment: Please continue amlodipine 5mg as listed to keep your blood pressure controlled. For blood pressure that is too high or too low please see your doctor or go to the emergency room as necessary.      Diagnosis: HLD (hyperlipidemia)  Assessment and Plan of Treatment: Please continue atorvastatin 80mg at bedtime to keep your cholesterol low. High cholesterol contributes to heart disease.      Diagnosis: DM (diabetes mellitus)  Assessment and Plan of Treatment: Your Hemoglobin A1c is 6.7 and your goal A1c is less than 7.0%. This number measures your average blood sugar level over the last three months.  Please continue your medications as listed for diabetes. Maintain a low carbohydrate, low sugar diet, exercise, monitor your fingerstick blood sugars regarly and follow up with your Endocrinologist/Primary Care Doctor.      Diagnosis: CKD (chronic kidney disease)  Assessment and Plan of Treatment: You have a known history of chronic kidney disease. Prior to dicharge your Creatinine was 1.31. Please follow up with your nephrologist as scheduled.

## 2023-07-15 NOTE — DISCHARGE NOTE PROVIDER - NSDCFUADDAPPT_GEN_ALL_CORE_FT
Please follow up with your cardiologist Dr. Madison in 1-2 weeks. Please call to make an appointment [phone]

## 2023-07-15 NOTE — DISCHARGE NOTE NURSING/CASE MANAGEMENT/SOCIAL WORK - PATIENT PORTAL LINK FT
You can access the FollowMyHealth Patient Portal offered by [facility] by registering at the following website: [email or web address]. By joining [facility]’s FollowMyHealth portal, you will also be able to view your health information using other applications (apps) compatible with our system.

## 2023-07-15 NOTE — DISCHARGE NOTE PROVIDER - NSDCFUADDINST_GEN_ALL_CORE_FT
Please continue to follow a heart healthy diet, low in sodium, cholesterol and fats. We recommend a Mediterranean diet:  -Incorporate 2 or more servings per day of vegetables, fruits, and whole grains  -Increase intake of fish and legumes/beans to 2 or more servings per week  -Increase intake of healthy fats, such as olive oil and avocados, and have a handful of nuts/seeds most days  -Reduce red/processed meat consumption to 2 or fewer times per week

## 2023-07-15 NOTE — DISCHARGE NOTE PROVIDER - NSDCMRMEDTOKEN_GEN_ALL_CORE_FT
alogliptin-metformin 12.5 mg-500 mg oral tablet: 1 orally 2 times a day  amLODIPine 5 mg oral tablet: 1 tab(s) orally once a day  aspirin 81 mg oral delayed release tablet: 1 tab(s) orally once a day  clopidogrel 75 mg oral tablet: 1 tab(s) orally once a day  Ferrous Sulfate 325mg daily: Take 1 tablet once a day  GABAPENTIN 300MG CAP: 1 cap(s) orally every 12 hours  JARDIANCE 25MG TAB: 1 tab(s) orally once a day  LEVOTHYROXINE SODIUM 50MCG TAB: 1 tab(s) orally once a day  Lipitor 80 mg oral tablet: 1 tab(s) orally once a day  metoprolol succinate 100 mg oral tablet, extended release: 1 tab(s) orally once a day  NovoLIN 70/30 FlexPen subcutaneous suspension: 30 unit(s) subcutaneous 2 times a day Take 30 units in AM and take 20 units in PM  pantoprazole 40 mg oral delayed release tablet: 1 orally prn  Vitamin D, 50,000 weekly:

## 2023-07-15 NOTE — DISCHARGE NOTE PROVIDER - HOSPITAL COURSE
INCOMPLETE    57-year-old male, former smoker, with PMHx SHELLFISH ALLERGY, HTN, HLD, DM, PAD, CKD (Cr 1.4 on 5/3/23), CVA x3 (2013/2014/2015 w/mild right side facial drooping, residual R sided weakness and aphasia now resolved), known CAD w/ 3VCABG 2022 (LIMA-LAD, SVG-RCA, SVG-Ramus), presented to cardiologist Dr. Madison's office after recent left lower extremity angiogram at [facility] on 6/9/23 which revealed 100% occlusion of left SFA with 3 vessel runoff. Patient underwent successful PTA using DCB. Postoperative course c/b left thigh swelling likely secondary to microperforation. Patient reports swelling is almost gone; reports mild discoloration. Patient reports that he has been able to walk properly without much pain in left leg since procedure. Patient denies CP, SOB, dizziness, palpitations, orthopnea/PND, leg swelling, LOC, bleeding, melena/hematochezia, fever, chills, URI symptoms, or recent illness.     In light of risk factors, [place] Class 3 claudication symptoms, and abnormal LE arterial duplex, patient presents to [facility] for staged angiogram of RLE with possible intervention if clinically indicated. 57-year-old male, former smoker, with PMHx SHELLFISH ALLERGY, HTN, HLD, DM, PAD, CKD (Cr 1.4 on 5/3/23), CVA x3 (2013/2014/2015 w/mild right side facial drooping, residual R sided weakness and aphasia now resolved), known CAD w/ 3VCABG 2022 (LIMA-LAD, SVG-RCA, SVG-Ramus), presented to cardiologist Dr. Madison's office after recent left lower extremity angiogram at [facility] on 6/9/23 which revealed 100% occlusion of left SFA with 3 vessel runoff. Patient underwent successful PTA using DCB. Peripheral cath (6/9/23): DCB X 2 100% stenosis p-m LT SFA,  R FA PC,  anterior tib TR access with plan to return in 6 weeks for staged proc of right leg. In light of risk factors, [place] Class 3 claudication symptoms, and abnormal LE arterial duplex, patient presented to [facility] for staged angiogram of RLE with possible intervention if clinically indicated.     Pt is now s/p peripheral angio w/ DCB RSFA via LFA, 7Fr sheath sutured - sheath removed 5pm.     Pt seen and examined at bedside this AM without any complaints or events overnight, VSS, labs and telemetry reviewed and pt stable for discharge as discussed with Dr. Hurd. Pt has received appropriate discharge instructions, including medication regimen, access site management and follow up with Dr. Madison in 1-2 weeks.    Discharge medications: ASA 81mg QD, Plavix 75mg QD, lipitor 80mg QD, amlodipine 5mg QD, protonix 40mg QD.

## 2023-07-15 NOTE — DISCHARGE NOTE PROVIDER - CARE PROVIDER_API CALL
Nabil Madison.  Cardiovascular Disease  [address] [place] [address]  Phone: [phone]  Fax: [phone]  Follow Up Time: 1 week

## 2023-07-15 NOTE — DISCHARGE NOTE NURSING/CASE MANAGEMENT/SOCIAL WORK - NSDCPEFALRISK_GEN_ALL_CORE
For information on Fall & Injury Prevention, visit: [email or web address] OR  [email or web address] OR  https://www.cdc.gov/steadi/patient.html

## 2023-07-26 DIAGNOSIS — I70.92 CHRONIC TOTAL OCCLUSION OF ARTERY OF THE EXTREMITIES: ICD-10-CM

## 2023-07-26 DIAGNOSIS — Z87.891 PERSONAL HISTORY OF NICOTINE DEPENDENCE: ICD-10-CM

## 2023-07-26 DIAGNOSIS — I25.10 ATHEROSCLEROTIC HEART DISEASE OF NATIVE CORONARY ARTERY WITHOUT ANGINA PECTORIS: ICD-10-CM

## 2023-07-26 DIAGNOSIS — E11.51 TYPE 2 DIABETES MELLITUS WITH DIABETIC PERIPHERAL ANGIOPATHY WITHOUT GANGRENE: ICD-10-CM

## 2023-07-26 DIAGNOSIS — N18.31 CHRONIC KIDNEY DISEASE, STAGE 3A: ICD-10-CM

## 2023-07-26 DIAGNOSIS — Z79.890 HORMONE REPLACEMENT THERAPY: ICD-10-CM

## 2023-07-26 DIAGNOSIS — Z86.73 PERSONAL HISTORY OF TRANSIENT ISCHEMIC ATTACK (TIA), AND CEREBRAL INFARCTION WITHOUT RESIDUAL DEFICITS: ICD-10-CM

## 2023-07-26 DIAGNOSIS — I70.213 ATHEROSCLEROSIS OF NATIVE ARTERIES OF EXTREMITIES WITH INTERMITTENT CLAUDICATION, BILATERAL LEGS: ICD-10-CM

## 2023-07-26 DIAGNOSIS — Z95.1 PRESENCE OF AORTOCORONARY BYPASS GRAFT: ICD-10-CM

## 2023-07-26 DIAGNOSIS — Z79.02 LONG TERM (CURRENT) USE OF ANTITHROMBOTICS/ANTIPLATELETS: ICD-10-CM

## 2023-07-26 DIAGNOSIS — E78.5 HYPERLIPIDEMIA, UNSPECIFIED: ICD-10-CM

## 2023-07-26 DIAGNOSIS — E11.22 TYPE 2 DIABETES MELLITUS WITH DIABETIC CHRONIC KIDNEY DISEASE: ICD-10-CM

## 2023-07-26 DIAGNOSIS — Z79.84 LONG TERM (CURRENT) USE OF ORAL HYPOGLYCEMIC DRUGS: ICD-10-CM

## 2023-07-26 DIAGNOSIS — Z79.82 LONG TERM (CURRENT) USE OF ASPIRIN: ICD-10-CM

## 2023-07-26 DIAGNOSIS — Z82.3 FAMILY HISTORY OF STROKE: ICD-10-CM

## 2023-07-26 DIAGNOSIS — I12.9 HYPERTENSIVE CHRONIC KIDNEY DISEASE WITH STAGE 1 THROUGH STAGE 4 CHRONIC KIDNEY DISEASE, OR UNSPECIFIED CHRONIC KIDNEY DISEASE: ICD-10-CM

## 2023-07-26 DIAGNOSIS — Z91.013 ALLERGY TO SEAFOOD: ICD-10-CM

## 2023-09-29 NOTE — CONSULT NOTE ADULT - NS ATTEST RISK PROBLEM GEN_ALL_CORE FT
LVM for parents to please call back to schedule patient for next week under the AMB clinic on Tuesday with Dr. Hank Salinas - per Dr. Hank Salinas Need for optimal glycemic control pre-CABG

## 2023-10-12 NOTE — CONSULT NOTE ADULT - SUBJECTIVE AND OBJECTIVE BOX
Surgery Consult Note    HPI:  PHARMACY: [facility]  Escort:  Ashwini Bello (wife)    58yo Male, SHELLFISH ALLERGY, PMHx of HTN, HLD, DM T2, CKD (baseline unknown),  PAD, CVA x3 (2013/2014/2015 w/mild right side facial drooping, residual R sided weakness and aphasia), known CAD w/ 3VCABG (LIMA-LAD, SVG-RCA, SVG-Ramus) who presented to outpatient cardiologist, Dr. Madison, endorsing pain in bilateral lower extremities w/ ambulation. Pt cannot walk more than 2 cit blocks. Pt also endorses LE edema in B/L ankles. Pt denies CP, SOB, palpitations, dizziness, orthopnea, PND, abdominal pain, N/V, fever/chills.     TTE (9/2022): LVEF 55-60%, no significant valvular disease, PASP 23mmHg.   Lower Extremity Arterial Duplex (per MD note): R side severe arterial insufficiency due to fem-pop and infrapopliteal disease; L sided severe arterial insufficiency due to iliac, fem-pop, and infrapopliteal disease.     In light of patient's risk factors, [place] class III claudication symptoms, and abnormal lower extremity arterial duplex, patient now presents to [facility] for peripheral angiogram with possible intervention if clinically indicated.  (05 Jun 2023 08:40)     6/9/23: s/p peripheral intervention: s/p PTA 6/9/23: 100% stenosis p-m LT SFA s/p drug coated balloon x2 via right CFA (perclose) and right Anterior tib (TR band in place)    Attending: Leona    Surgery Addendum:  Vascular Surgery consulted for thigh hematoma s/p LLE intervention (DCB x2 to SFA) today. Patient noticed some pain and pressure in left thigh about an hour after his procedure today. He was seen laina primary team/nursing who noticed that the patients thigh had a rapidly expanding hematoma and immediately applied compressive wrap to leg. According to patient the pain has since improved. Denies any numbness or tingle in his left leg.     PAST MEDICAL & SURGICAL HISTORY:  HTN (hypertension)      HLD (hyperlipidemia)      DM (diabetes mellitus)      Cerebrovascular accident (CVA)      PAD (peripheral artery disease)      CKD (chronic kidney disease)      No significant past surgical history          MEDICATIONS  (STANDING):  atorvastatin 40 milliGRAM(s) Oral at bedtime  dextrose 5%. 1000 milliLiter(s) (100 mL/Hr) IV Continuous <Continuous>  dextrose 5%. 1000 milliLiter(s) (50 mL/Hr) IV Continuous <Continuous>  dextrose 50% Injectable 25 Gram(s) IV Push once  dextrose 50% Injectable 12.5 Gram(s) IV Push once  dextrose 50% Injectable 25 Gram(s) IV Push once  diphenhydrAMINE Injectable 50 milliGRAM(s) IV Push Once  ferrous    sulfate 325 milliGRAM(s) Oral daily  gabapentin 300 milliGRAM(s) Oral every 12 hours  glucagon  Injectable 1 milliGRAM(s) IntraMuscular once  insulin lispro (ADMELOG) corrective regimen sliding scale   SubCutaneous Before meals and at bedtime  levothyroxine 50 MICROGram(s) Oral daily  metoprolol succinate  milliGRAM(s) Oral daily  sodium chloride 0.9%. 1000 milliLiter(s) (100 mL/Hr) IV Continuous <Continuous>    MEDICATIONS  (PRN):  cyclobenzaprine 5 milliGRAM(s) Oral daily PRN Muscle Spasm  dextrose Oral Gel 15 Gram(s) Oral once PRN Blood Glucose LESS THAN 70 milliGRAM(s)/deciliter      Allergies    No Known Drug Allergies  shellfish (Rash; Urticaria)    Intolerances        SOCIAL HISTORY:    FAMILY HISTORY:  FH: stroke (Father)    Family history of diabetes mellitus (DM) (Mother)        Vital Signs Last 24 Hrs  T(C): 36.9 (09 Jun 2023 18:29), Max: 36.9 (09 Jun 2023 18:29)  T(F): 98.4 (09 Jun 2023 18:29), Max: 98.4 (09 Jun 2023 18:29)  HR: 69 (09 Jun 2023 17:00) (68 - 71)  BP: 125/79 (09 Jun 2023 17:00) (125/79 - 140/74)  BP(mean): 99 (09 Jun 2023 17:00) (94 - 100)  RR: 18 (09 Jun 2023 17:00) (18 - 18)  SpO2: 100% (09 Jun 2023 17:00) (100% - 100%)    Parameters below as of 09 Jun 2023 17:00  Patient On (Oxygen Delivery Method): room air        I&O's Summary      Physical Exam:  General: NAD, resting comfortably  HEENT: NC/AT, EOMI, normal hearing, no oral lesions, no LAD, neck supple  Pulmonary: normal resp effort, CTA-B  Cardiovascular: NSR, no murmurs  Abdominal: soft, ND/NT, no organomegaly  Extremities: WWP, normal strength, no clubbing/cyanosis/edema  Neuro: A/O x 3, CNs II-XII grossly intact, normal sensation, no focal deficits  Pulses: palpable femoral, triphasic popliteal, triphasic PT, biphsaic DP     Lines/drains/tubes:    LABS:                        12.9   8.81  )-----------( 207      ( 09 Jun 2023 18:16 )             40.9     06-09    137  |  104  |  23  ----------------------------<  162<H>  4.7   |  20<L>  |  1.42<H>    Ca    8.4      09 Jun 2023 08:03  Mg     2.0     06-09    TPro  7.5  /  Alb  4.0  /  TBili  0.5  /  DBili  x   /  AST  25  /  ALT  22  /  AlkPhos  55  06-09    PT/INR - ( 09 Jun 2023 08:02 )   PT: 12.1 sec;   INR: 1.02          PTT - ( 09 Jun 2023 08:02 )  PTT:30.4 sec    CAPILLARY BLOOD GLUCOSE      POCT Blood Glucose.: 134 mg/dL (09 Jun 2023 11:37)  POCT Blood Glucose.: 147 mg/dL (09 Jun 2023 08:29)    LIVER FUNCTIONS - ( 09 Jun 2023 08:03 )  Alb: 4.0 g/dL / Pro: 7.5 g/dL / ALK PHOS: 55 U/L / ALT: 22 U/L / AST: 25 U/L / GGT: x             Cultures:      RADIOLOGY & ADDITIONAL STUDIES:         Addended by: VALERY BUCK on: 10/12/2023 08:50 AM     Modules accepted: Orders

## 2024-07-01 NOTE — PATIENT PROFILE ADULT - DO YOU FEEL THREATENED BY OTHERS?
SURGERY:_____/_____/_____    FASTING RECOMMENDATIONS:  Ingested material minimum fasting period:  Clear Liquids- 2 hours (examples of clear liquids include water, fruit juices without pulp, carbonated beverages, clear tea, and black coffee)  Breast Milk- 4 hours   Infant Formula- 6 Hours   Nonhuman milk- 6 hours (Since nonhuman milk is like solids in gastric emptying time, the amount ingested must be considered when determining an appropriate fasting period)  Light meal- 6 hours (a light meal typically consist of toast and clear liquids. Meals that include fried or fatty foods or meat may prolong gastric emptying time. Additional fasting time (e.g. 8 or more hours) may be needed in these cases. Both the amount and type of foods ingested must be considered when determining an appropriate fasting period. Exceptions are ERAS protocol surgeries.)    DO NOT TAKE ANY ASPIRIN PRODUCTS 7 days prior to surgery. Tylenol only. No Advil, Motrin, Aleve, or Ibuprofen. IF YOU ARE ON BLOOD THINNERS (PLAVIX, COUMADIN, ELIQUIS ETC) THESE WILL ALSO NEED TO BE HELD.    Special administration instructions related to diabetic medications include: GLP-1 agonist medications (ex. Ozempic and Trulicity) are to adhere to guidelines as follows:   Glucagon-like-peptide-1 (GLP-1) agonist medication:  Hold GLP-1 agonist beginning 1 day prior to the day of surgery for those who date the medication daily. (Ex. Sx scheduled Wednesday-- last dose of GLP-1 agonist on Monday)  Hold GLP-1 agonist one week prior  to the procedure/surgery for patients who take the medications weekly.  Sodium Glucose Co-Transporter 2 (SGLT2) inhibitors must be discontinued 3-4 days prior to surgery. (Brenzavvy [bexaglifloxin]; Invokana [canagliflozin] ; Farxiga [dapagliflozin]; Jardiance [empagliflozin]; Steglatro [ertugliflozin]).    Any illegal drugs in your system (including Marijuana even if legally prescribed) will result in your surgery being cancelled. Please be 
no

## (undated) DEVICE — CATH TRIOX OXIMETRY 8F 3 LUMENS

## (undated) DEVICE — DRSG STOCKINETTE IMPERVIOUS XL

## (undated) DEVICE — DRSG STOCKINETTE IMPERVIOUS MED

## (undated) DEVICE — TUBING STRYKER PNEUMOCLEAR HIGH FLOW

## (undated) DEVICE — Device

## (undated) DEVICE — DRSG ACE BANDAGE 6" LF STERILE

## (undated) DEVICE — GLV 7.5 PROTEXIS (WHITE)

## (undated) DEVICE — CATH IV SAFE BC 24G X 0.75" (YELLOW)

## (undated) DEVICE — GEL AQUSNC PACKET 20GR

## (undated) DEVICE — ELCTR BOVIE TIP BLADE VALLEYLAB 6.5"

## (undated) DEVICE — DRSG DERMABOND 0.7ML

## (undated) DEVICE — POSITIONER FOAM EGG CRATE ULNAR 2PCS (PINK)

## (undated) DEVICE — DRSG ALLEVYN LIFE 6 X 6 (PINK)

## (undated) DEVICE — CONNECTOR CARDIAC 1:1 FOR HUBLESS DRAINS

## (undated) DEVICE — SUT VICRYL 4-0 18" PS-2 UNDYED

## (undated) DEVICE — PACING CABLE SURGICAL 12FT WITH SMALL CLIP

## (undated) DEVICE — FOLEY TRAY 16FR 5CC LTX URINE METER TEMP SENSING CLOSED

## (undated) DEVICE — DRAPE IOBAN 33" X 23"

## (undated) DEVICE — BLADE SCALPEL SAFETY #11 WITH PLASTIC GREEN HANDLE

## (undated) DEVICE — PACK SCHEINERMAN OPEN HEART A

## (undated) DEVICE — SUT PROLENE 6-0 30" C-1

## (undated) DEVICE — CONNECTOR STRAIGHT 1/2 X 1/2"

## (undated) DEVICE — VASOVIEW HEMOPRO ENDO SYSTEM

## (undated) DEVICE — SENSOR TERUMO SENSMART 8204CA ADULT/PED

## (undated) DEVICE — SUT PROLENE BV 130-5 8-0

## (undated) DEVICE — FOLEY TRAY 16FR 5CC LF LUBRISIL ADVANCE TEMP CLOSED

## (undated) DEVICE — CATH CV TRAY INSR ST UNIV

## (undated) DEVICE — ELCTR BOVIE PENCIL HANDPIECE ROCKER SWITCH 15FT

## (undated) DEVICE — BLADE STERN SYS 6 305X1MM

## (undated) DEVICE — SUT PROLENE 4-0 36" BB

## (undated) DEVICE — BLADE SCALPEL SAFETY #15 WITH PLASTIC GREEN HANDLE

## (undated) DEVICE — GETINGE VASOVIEW 7 ENDOSCOPIC VESSEL HARVESTING SYSTEM

## (undated) DEVICE — GAUZE SPONGE 12PLY DMT MT 8X4

## (undated) DEVICE — PACK PROC CV DRAPE

## (undated) DEVICE — DRSG BIOPATCH DISK W CHG 1" W 4.0MM HOLE

## (undated) DEVICE — ELCTR BOVIE TIP BLADE INSULATED 3" EDGE

## (undated) DEVICE — SUT PROLENE 7-0 30" BV-1

## (undated) DEVICE — CATH NG SALEM SUMP 16FR

## (undated) DEVICE — STOPCOCK 3 WAY

## (undated) DEVICE — SUT PERMAHAND 1 10-30"

## (undated) DEVICE — TUBING BRAT 2 SUCTION ASSEMBLY TWIST LOCK

## (undated) DEVICE — PACING CABLE (BROWN) A/V TEMP SCREW DOWN 12FT

## (undated) DEVICE — PUNCH VASC STD 7.75" HANDLE 4MM DISP

## (undated) DEVICE — PREP CHLORAPREP HI-LITE ORANGE 26ML

## (undated) DEVICE — DRSG TEGADERM 4X4.75"

## (undated) DEVICE — BLADE SCALPEL SAFETY #10 WITH PLASTIC GREEN HANDLE

## (undated) DEVICE — SUT VICRYL 3-0 27" MH

## (undated) DEVICE — SUT SILK 4-0 12-30" TIES

## (undated) DEVICE — SUT SILK 1 30" MH

## (undated) DEVICE — DRSG KLING 4"

## (undated) DEVICE — SUT VICRYL 1 27" CT-1

## (undated) DEVICE — PREP SCRUB BRUSH W CHG 4%

## (undated) DEVICE — TUBING SUCTION NONCONDUCTIVE 6MM X 12FT

## (undated) DEVICE — DRAPE OR CAMERA COVER

## (undated) DEVICE — BLOWER MISTER AXIUS WITH IV SET

## (undated) DEVICE — SUMP INTRACARDIAC/PERICARDIAL 20FR 1/4" ADULT

## (undated) DEVICE — SUT SILK 2-0 30" TIES

## (undated) DEVICE — SUT PLEDGET SOFT LARGE 3/8" X 3/16" X 1/16" X6

## (undated) DEVICE — ELCTR STRYKER NEPTUNE SMOKE EVACUATION PENCIL (GREEN)

## (undated) DEVICE — SOL ANTI FOG

## (undated) DEVICE — SUT VICRYL 3-0 27" SH

## (undated) DEVICE — CHEST DRAIN OASIS DRY SUCTION WATER SEAL

## (undated) DEVICE — STEALTH CLAMP INSERT FIBRA/FIBRA 90MM

## (undated) DEVICE — TUBING SMOKE EVAC 3/8" X 10FT FOR NEPTUNE

## (undated) DEVICE — DRSG MEPILEX 10 X 25CM (4 X 10") AG

## (undated) DEVICE — ELCTR BOVIE TIP BLADE MEGADYNE E-Z CLEAN 4" EXTENDED

## (undated) DEVICE — CONTAINER SPECIMEN URINE 120ML

## (undated) DEVICE — DRSG TRACH DRAINAGE 4X4

## (undated) DEVICE — DRAPE SLUSH / WARMER 44 X 66"

## (undated) DEVICE — GOWN LG

## (undated) DEVICE — DRAPE LIGHT HANDLE COVER (GREEN)

## (undated) DEVICE — SUT PROLENE 3-0 36" SH

## (undated) DEVICE — PACING CABLE (BLUE) ATRIAL TEMP SCREW DOWN 12FT

## (undated) DEVICE — DRAPE TOWEL BLUE 17" X 24"

## (undated) DEVICE — ELCTR BOVIE TIP BLADE INSULATED 6.5" EDGE

## (undated) DEVICE — MULTIPLE PERFUSION SET FEMALE 1 INLET LEG W 4 LEGS / VENT 15" (RED/RED)

## (undated) DEVICE — SUT SILK 3-0 30" BB

## (undated) DEVICE — DRAPE PROBE COVER 5" X 96"

## (undated) DEVICE — SPECIMEN CONTAINER 100ML

## (undated) DEVICE — SUT SILK 5-0 60" TIES

## (undated) DEVICE — SUT STAINLESS STEEL 6 4-18" CCS

## (undated) DEVICE — URETERAL CATH RED RUBBER 16FR (ORANGE)

## (undated) DEVICE — PACK SCHEINERMAN CABAG

## (undated) DEVICE — DRSG KIT CVC TEGADERM ADVANCED

## (undated) DEVICE — SUCTION CATH AIRLIFE CONTROL VALVE TRIFLO 14FR